# Patient Record
Sex: MALE | HISPANIC OR LATINO | URBAN - METROPOLITAN AREA
[De-identification: names, ages, dates, MRNs, and addresses within clinical notes are randomized per-mention and may not be internally consistent; named-entity substitution may affect disease eponyms.]

---

## 2021-08-16 ENCOUNTER — EMERGENCY (EMERGENCY)
Facility: HOSPITAL | Age: 71
LOS: 1 days | Discharge: HOME | End: 2021-08-16
Attending: EMERGENCY MEDICINE | Admitting: EMERGENCY MEDICINE
Payer: MEDICARE

## 2021-08-16 VITALS
DIASTOLIC BLOOD PRESSURE: 87 MMHG | HEART RATE: 79 BPM | SYSTOLIC BLOOD PRESSURE: 147 MMHG | RESPIRATION RATE: 18 BRPM | OXYGEN SATURATION: 97 % | TEMPERATURE: 98 F

## 2021-08-16 VITALS
OXYGEN SATURATION: 98 % | WEIGHT: 175.05 LBS | SYSTOLIC BLOOD PRESSURE: 159 MMHG | RESPIRATION RATE: 19 BRPM | HEART RATE: 83 BPM | DIASTOLIC BLOOD PRESSURE: 92 MMHG | HEIGHT: 67 IN | TEMPERATURE: 98 F

## 2021-08-16 LAB
ALBUMIN SERPL ELPH-MCNC: 3.9 G/DL — SIGNIFICANT CHANGE UP (ref 3.3–5)
ALP SERPL-CCNC: 79 U/L — SIGNIFICANT CHANGE UP (ref 40–120)
ALT FLD-CCNC: 27 U/L — SIGNIFICANT CHANGE UP (ref 12–78)
ANION GAP SERPL CALC-SCNC: 5 MMOL/L — SIGNIFICANT CHANGE UP (ref 5–17)
APPEARANCE UR: CLEAR — SIGNIFICANT CHANGE UP
AST SERPL-CCNC: 18 U/L — SIGNIFICANT CHANGE UP (ref 15–37)
BASOPHILS # BLD AUTO: 0.02 K/UL — SIGNIFICANT CHANGE UP (ref 0–0.2)
BASOPHILS NFR BLD AUTO: 0.3 % — SIGNIFICANT CHANGE UP (ref 0–2)
BILIRUB SERPL-MCNC: 0.4 MG/DL — SIGNIFICANT CHANGE UP (ref 0.2–1.2)
BILIRUB UR-MCNC: NEGATIVE — SIGNIFICANT CHANGE UP
BUN SERPL-MCNC: 17 MG/DL — SIGNIFICANT CHANGE UP (ref 7–23)
CALCIUM SERPL-MCNC: 8.8 MG/DL — SIGNIFICANT CHANGE UP (ref 8.5–10.1)
CHLORIDE SERPL-SCNC: 109 MMOL/L — HIGH (ref 96–108)
CO2 SERPL-SCNC: 30 MMOL/L — SIGNIFICANT CHANGE UP (ref 22–31)
COLOR SPEC: YELLOW — SIGNIFICANT CHANGE UP
CREAT SERPL-MCNC: 0.87 MG/DL — SIGNIFICANT CHANGE UP (ref 0.5–1.3)
DIFF PNL FLD: ABNORMAL
EOSINOPHIL # BLD AUTO: 0.01 K/UL — SIGNIFICANT CHANGE UP (ref 0–0.5)
EOSINOPHIL NFR BLD AUTO: 0.1 % — SIGNIFICANT CHANGE UP (ref 0–6)
GLUCOSE SERPL-MCNC: 114 MG/DL — HIGH (ref 70–99)
GLUCOSE UR QL: NEGATIVE — SIGNIFICANT CHANGE UP
HCT VFR BLD CALC: 43.3 % — SIGNIFICANT CHANGE UP (ref 39–50)
HGB BLD-MCNC: 14.1 G/DL — SIGNIFICANT CHANGE UP (ref 13–17)
IMM GRANULOCYTES NFR BLD AUTO: 0.3 % — SIGNIFICANT CHANGE UP (ref 0–1.5)
KETONES UR-MCNC: NEGATIVE — SIGNIFICANT CHANGE UP
LEUKOCYTE ESTERASE UR-ACNC: NEGATIVE — SIGNIFICANT CHANGE UP
LIDOCAIN IGE QN: 174 U/L — SIGNIFICANT CHANGE UP (ref 73–393)
LYMPHOCYTES # BLD AUTO: 1.65 K/UL — SIGNIFICANT CHANGE UP (ref 1–3.3)
LYMPHOCYTES # BLD AUTO: 23.9 % — SIGNIFICANT CHANGE UP (ref 13–44)
MCHC RBC-ENTMCNC: 28.4 PG — SIGNIFICANT CHANGE UP (ref 27–34)
MCHC RBC-ENTMCNC: 32.6 GM/DL — SIGNIFICANT CHANGE UP (ref 32–36)
MCV RBC AUTO: 87.3 FL — SIGNIFICANT CHANGE UP (ref 80–100)
MONOCYTES # BLD AUTO: 0.38 K/UL — SIGNIFICANT CHANGE UP (ref 0–0.9)
MONOCYTES NFR BLD AUTO: 5.5 % — SIGNIFICANT CHANGE UP (ref 2–14)
NEUTROPHILS # BLD AUTO: 4.81 K/UL — SIGNIFICANT CHANGE UP (ref 1.8–7.4)
NEUTROPHILS NFR BLD AUTO: 69.9 % — SIGNIFICANT CHANGE UP (ref 43–77)
NITRITE UR-MCNC: NEGATIVE — SIGNIFICANT CHANGE UP
NRBC # BLD: 0 /100 WBCS — SIGNIFICANT CHANGE UP (ref 0–0)
PH UR: 6 — SIGNIFICANT CHANGE UP (ref 5–8)
PLATELET # BLD AUTO: 269 K/UL — SIGNIFICANT CHANGE UP (ref 150–400)
POTASSIUM SERPL-MCNC: 3.9 MMOL/L — SIGNIFICANT CHANGE UP (ref 3.5–5.3)
POTASSIUM SERPL-SCNC: 3.9 MMOL/L — SIGNIFICANT CHANGE UP (ref 3.5–5.3)
PROT SERPL-MCNC: 7.2 G/DL — SIGNIFICANT CHANGE UP (ref 6–8.3)
PROT UR-MCNC: 30 MG/DL
RBC # BLD: 4.96 M/UL — SIGNIFICANT CHANGE UP (ref 4.2–5.8)
RBC # FLD: 12.7 % — SIGNIFICANT CHANGE UP (ref 10.3–14.5)
SODIUM SERPL-SCNC: 144 MMOL/L — SIGNIFICANT CHANGE UP (ref 135–145)
SP GR SPEC: 1.02 — SIGNIFICANT CHANGE UP (ref 1.01–1.02)
UROBILINOGEN FLD QL: NEGATIVE — SIGNIFICANT CHANGE UP
WBC # BLD: 6.89 K/UL — SIGNIFICANT CHANGE UP (ref 3.8–10.5)
WBC # FLD AUTO: 6.89 K/UL — SIGNIFICANT CHANGE UP (ref 3.8–10.5)

## 2021-08-16 PROCEDURE — 74176 CT ABD & PELVIS W/O CONTRAST: CPT | Mod: MA

## 2021-08-16 PROCEDURE — 99284 EMERGENCY DEPT VISIT MOD MDM: CPT | Mod: 25

## 2021-08-16 PROCEDURE — 85025 COMPLETE CBC W/AUTO DIFF WBC: CPT

## 2021-08-16 PROCEDURE — 76870 US EXAM SCROTUM: CPT | Mod: 26

## 2021-08-16 PROCEDURE — 83690 ASSAY OF LIPASE: CPT

## 2021-08-16 PROCEDURE — 81001 URINALYSIS AUTO W/SCOPE: CPT

## 2021-08-16 PROCEDURE — 99284 EMERGENCY DEPT VISIT MOD MDM: CPT

## 2021-08-16 PROCEDURE — 36415 COLL VENOUS BLD VENIPUNCTURE: CPT

## 2021-08-16 PROCEDURE — 74176 CT ABD & PELVIS W/O CONTRAST: CPT | Mod: 26,MA

## 2021-08-16 PROCEDURE — 76870 US EXAM SCROTUM: CPT

## 2021-08-16 PROCEDURE — 80053 COMPREHEN METABOLIC PANEL: CPT

## 2021-08-16 PROCEDURE — 87086 URINE CULTURE/COLONY COUNT: CPT

## 2021-08-16 RX ORDER — CEFUROXIME AXETIL 250 MG
500 TABLET ORAL ONCE
Refills: 0 | Status: COMPLETED | OUTPATIENT
Start: 2021-08-16 | End: 2021-08-16

## 2021-08-16 RX ORDER — TAMSULOSIN HYDROCHLORIDE 0.4 MG/1
1 CAPSULE ORAL
Qty: 15 | Refills: 0
Start: 2021-08-16 | End: 2021-08-30

## 2021-08-16 RX ORDER — TAMSULOSIN HYDROCHLORIDE 0.4 MG/1
0.4 CAPSULE ORAL AT BEDTIME
Refills: 0 | Status: DISCONTINUED | OUTPATIENT
Start: 2021-08-16 | End: 2021-08-20

## 2021-08-16 RX ORDER — ONDANSETRON 8 MG/1
1 TABLET, FILM COATED ORAL
Qty: 15 | Refills: 0
Start: 2021-08-16 | End: 2021-08-20

## 2021-08-16 RX ORDER — CEFUROXIME AXETIL 250 MG
1 TABLET ORAL
Qty: 10 | Refills: 0
Start: 2021-08-16 | End: 2021-08-20

## 2021-08-16 RX ORDER — OXYCODONE AND ACETAMINOPHEN 5; 325 MG/1; MG/1
1 TABLET ORAL
Qty: 12 | Refills: 0
Start: 2021-08-16 | End: 2021-08-18

## 2021-08-16 RX ADMIN — Medication 500 MILLIGRAM(S): at 21:35

## 2021-08-16 RX ADMIN — TAMSULOSIN HYDROCHLORIDE 0.4 MILLIGRAM(S): 0.4 CAPSULE ORAL at 21:35

## 2021-08-16 NOTE — ED PROVIDER NOTE - ATTENDING CONTRIBUTION TO CARE
Pt is a 70 yo male who presents to the ED with a cc of urinary frequency. Pt with no significant past medical history. Reports that approx 1-2 month ago pt started experiencing urinary frequency, urgency, with a weak stream. He reports that it felt like he would need to urinate every 10 min. Yesterday he developed suprapubic discomfort with testicular pressure. He does admit to decreased water intake and believes that this may be contributing to his symptoms. He also reports that 2 weeks ago he was seen at an urgent care due to a cough which seems to have resolved. He was given a Z-Pack which he did not take and was tested for COVID 19 with a negative result. Denies fever, chills, N/V, CP, SOB, ext numbness or weakness. Does note discomfort on urination x several days. Denies hematuria. Denies recent weight loss. Pt reports that he follow up with a urologist approx 1.5 yr ago and the urologist preformed some type of manual exam on his prostate but pt states that this was uncomfortable and he never follow up for the results. On exam pt lying in bed NAD, NCAT, PERRL, EOMI, heart RR, lungs CTA, abd soft NT/ND. Uncircumcised with no lesions. Foreskin easily retracts no discharge from penis noted. No testicular pain on exam. No inguinal hernia noted. Pt presenting with increased urinary frequency, urgency, and now with dysuria and testicular pain. Concern for BPH, vs infection, vs renal stone, vs testicular pathology. Will obtain screening labs, CT renal stone hunt, testicular US. Will monitor.

## 2021-08-16 NOTE — ED PROVIDER NOTE - OBJECTIVE STATEMENT
71 year old male with no PMH, presents to the ED with increased urinary frequency. States about two months ago he started having an increased sensation to pee, worsened today to every 10 minutes, with constant discomfort in the genital area, rates 6/10, no radiation, believes his decreased water intake today may of contributed to greater frequency of urination. 2 weeks ago he visited urgent care due to coughing, given z-pack (didn't take), tested negative for COVID. No previous history of increased sensation, no recent travel, or sick contacts. Denies fever, chills, weight loss, vision changes, nausea, vomiting, rash, chest pain or shortness of breath.

## 2021-08-16 NOTE — ED PROVIDER NOTE - PROGRESS NOTE DETAILS
res results of labs and images reviewed, copy provided, all questions answered. pt resting comfortably, pain controlled, and pt able to void. Advised on meed for urology follow up and all questions answered. Daughter at bedside

## 2021-08-16 NOTE — ED PROVIDER NOTE - CLINICAL SUMMARY MEDICAL DECISION MAKING FREE TEXT BOX
Pt presenting with increased urinary frequency, urgency, and now with dysuria and testicular pain. Concern for BPH, vs infection, vs renal stone, vs testicular pathology. Will obtain screening labs, CT renal stone hunt, testicular US. Will monitor.

## 2021-08-16 NOTE — ED PROVIDER NOTE - CARE PROVIDER_API CALL
Emmanuel Hansen)  Urology  [address] [place] [address], [address]  [place] [address]  Phone: [phone]  Fax: [phone]  Follow Up Time:

## 2021-08-16 NOTE — ED PROVIDER NOTE - PATIENT PORTAL LINK FT
You can access the FollowMyHealth Patient Portal offered by [facility] by registering at the following website: [email or web address]. By joining [employer]’s FollowMyHealth portal, you will also be able to view your health information using other applications (apps) compatible with our system.

## 2021-08-16 NOTE — ED PROVIDER NOTE - NSFOLLOWUPINSTRUCTIONS_ED_ALL_ED_FT
Return to the ED for any new or worsening symptoms including but not limited to fever temperature 100.4 or greater  Take your medication as prescribed  Flomax 1 tab daily   Ceftin 1 tab 2 times a day   Percocet per label instructions as needed for severe pain  Zofran per label instructions as needed for nausea  Follow up with urology call tomorrow to schedule follow up   Advance activity as tolerated  Kidney Stones    WHAT YOU NEED TO KNOW:    Kidney stones form in the urinary system when the water and waste in your urine are out of balance. When this happens, certain types of waste crystals separate from the urine. The crystals build up and form kidney stones. You may have more than one kidney stone.    Kidney Stones          DISCHARGE INSTRUCTIONS:    Return to the emergency department if:   •You are vomiting and it is not relieved with medicine.          Call your doctor or kidney specialist if:   •You have a fever.      •You have trouble urinating.      •You see blood in your urine.      •You have severe pain.      •You have any questions or concerns about your condition or care.      Medicines: You may need any of the following:  •NSAIDs, such as ibuprofen, help decrease swelling, pain, and fever. This medicine is available with or without a doctor's order. NSAIDs can cause stomach bleeding or kidney problems in certain people. If you take blood thinner medicine, always ask your healthcare provider if NSAIDs are safe for you. Always read the medicine label and follow directions.      •Acetaminophen decreases pain and fever. It is available without a doctor's order. Ask how much to take and how often to take it. Follow directions. Read the labels of all other medicines you are using to see if they also contain acetaminophen, or ask your doctor or pharmacist. Acetaminophen can cause liver damage if not taken correctly. Do not use more than 4 grams (4,000 milligrams) total of acetaminophen in one day.       •Prescription pain medicine may be given. Ask your healthcare provider how to take this medicine safely. Some prescription pain medicines contain acetaminophen. Do not take other medicines that contain acetaminophen without talking to your healthcare provider. Too much acetaminophen may cause liver damage. Prescription pain medicine may cause constipation. Ask your healthcare provider how to prevent or treat constipation.       •Medicines to balance your electrolytes may be needed.      •Take your medicine as directed. Contact your healthcare provider if you think your medicine is not helping or if you have side effects. Tell him or her if you are allergic to any medicine. Keep a list of the medicines, vitamins, and herbs you take. Include the amounts, and when and why you take them. Bring the list or the pill bottles to follow-up visits. Carry your medicine list with you in case of an emergency.      What you can do to manage kidney stones:   •Drink more liquids. Your healthcare provider may tell you to drink at least 8 to 12 (eight-ounce) cups of liquids each day. This helps flush out the kidney stones when you urinate. Water is the best liquid to drink.      •Strain your urine every time you go to the bathroom. Urinate through a strainer or a piece of thin cloth to catch the stones. Take the stones to your healthcare provider so they can be sent to the lab for tests. This will help your healthcare providers plan the best treatment for you.  Look for Stones in the Filter           •Eat a variety of healthy foods. Healthy foods include fruits, vegetables, whole-grain breads, low-fat dairy products, beans, and fish. You may need to limit how much sodium (salt) or protein you eat. Ask for information about the best foods for you.  Healthy Foods           •Be physically active as directed. Your stones may pass more easily if you stay active. Physical activity can also help you manage your weight. Ask about the best activities for you.  [employer] Walking for Exercise           After you pass the kidney stones: Your healthcare provider may order a 24-hour urine test. Results from a 24-hour urine test will help your healthcare provider plan ways to prevent more stones from forming. Your healthcare provider will give you more instructions.    Follow up with your doctor or kidney specialist as directed: Write down your questions so you remember to ask them during your visits.

## 2021-08-17 ENCOUNTER — EMERGENCY (EMERGENCY)
Facility: HOSPITAL | Age: 71
LOS: 1 days | Discharge: HOME | End: 2021-08-17
Attending: EMERGENCY MEDICINE | Admitting: EMERGENCY MEDICINE
Payer: MEDICARE

## 2021-08-17 VITALS
SYSTOLIC BLOOD PRESSURE: 174 MMHG | TEMPERATURE: 98 F | HEART RATE: 89 BPM | DIASTOLIC BLOOD PRESSURE: 82 MMHG | RESPIRATION RATE: 16 BRPM | OXYGEN SATURATION: 98 %

## 2021-08-17 VITALS
WEIGHT: 169.98 LBS | OXYGEN SATURATION: 98 % | SYSTOLIC BLOOD PRESSURE: 181 MMHG | HEIGHT: 67 IN | HEART RATE: 78 BPM | TEMPERATURE: 99 F | RESPIRATION RATE: 18 BRPM | DIASTOLIC BLOOD PRESSURE: 81 MMHG

## 2021-08-17 LAB
ALBUMIN SERPL ELPH-MCNC: 4.1 G/DL — SIGNIFICANT CHANGE UP (ref 3.3–5)
ALP SERPL-CCNC: 80 U/L — SIGNIFICANT CHANGE UP (ref 40–120)
ALT FLD-CCNC: 26 U/L — SIGNIFICANT CHANGE UP (ref 12–78)
ANION GAP SERPL CALC-SCNC: 7 MMOL/L — SIGNIFICANT CHANGE UP (ref 5–17)
AST SERPL-CCNC: 17 U/L — SIGNIFICANT CHANGE UP (ref 15–37)
BASOPHILS # BLD AUTO: 0.01 K/UL — SIGNIFICANT CHANGE UP (ref 0–0.2)
BASOPHILS NFR BLD AUTO: 0.1 % — SIGNIFICANT CHANGE UP (ref 0–2)
BILIRUB SERPL-MCNC: 0.5 MG/DL — SIGNIFICANT CHANGE UP (ref 0.2–1.2)
BUN SERPL-MCNC: 24 MG/DL — HIGH (ref 7–23)
CALCIUM SERPL-MCNC: 8.9 MG/DL — SIGNIFICANT CHANGE UP (ref 8.5–10.1)
CHLORIDE SERPL-SCNC: 108 MMOL/L — SIGNIFICANT CHANGE UP (ref 96–108)
CO2 SERPL-SCNC: 27 MMOL/L — SIGNIFICANT CHANGE UP (ref 22–31)
CREAT SERPL-MCNC: 1.5 MG/DL — HIGH (ref 0.5–1.3)
EOSINOPHIL # BLD AUTO: 0 K/UL — SIGNIFICANT CHANGE UP (ref 0–0.5)
EOSINOPHIL NFR BLD AUTO: 0 % — SIGNIFICANT CHANGE UP (ref 0–6)
GLUCOSE SERPL-MCNC: 180 MG/DL — HIGH (ref 70–99)
HCT VFR BLD CALC: 43.3 % — SIGNIFICANT CHANGE UP (ref 39–50)
HGB BLD-MCNC: 14.2 G/DL — SIGNIFICANT CHANGE UP (ref 13–17)
IMM GRANULOCYTES NFR BLD AUTO: 0.5 % — SIGNIFICANT CHANGE UP (ref 0–1.5)
LYMPHOCYTES # BLD AUTO: 0.61 K/UL — LOW (ref 1–3.3)
LYMPHOCYTES # BLD AUTO: 7 % — LOW (ref 13–44)
MCHC RBC-ENTMCNC: 28.3 PG — SIGNIFICANT CHANGE UP (ref 27–34)
MCHC RBC-ENTMCNC: 32.8 GM/DL — SIGNIFICANT CHANGE UP (ref 32–36)
MCV RBC AUTO: 86.4 FL — SIGNIFICANT CHANGE UP (ref 80–100)
MONOCYTES # BLD AUTO: 0.22 K/UL — SIGNIFICANT CHANGE UP (ref 0–0.9)
MONOCYTES NFR BLD AUTO: 2.5 % — SIGNIFICANT CHANGE UP (ref 2–14)
NEUTROPHILS # BLD AUTO: 7.85 K/UL — HIGH (ref 1.8–7.4)
NEUTROPHILS NFR BLD AUTO: 89.9 % — HIGH (ref 43–77)
NRBC # BLD: 0 /100 WBCS — SIGNIFICANT CHANGE UP (ref 0–0)
PLATELET # BLD AUTO: 266 K/UL — SIGNIFICANT CHANGE UP (ref 150–400)
POTASSIUM SERPL-MCNC: 3.9 MMOL/L — SIGNIFICANT CHANGE UP (ref 3.5–5.3)
POTASSIUM SERPL-SCNC: 3.9 MMOL/L — SIGNIFICANT CHANGE UP (ref 3.5–5.3)
PROT SERPL-MCNC: 7.5 G/DL — SIGNIFICANT CHANGE UP (ref 6–8.3)
RBC # BLD: 5.01 M/UL — SIGNIFICANT CHANGE UP (ref 4.2–5.8)
RBC # FLD: 12.8 % — SIGNIFICANT CHANGE UP (ref 10.3–14.5)
SODIUM SERPL-SCNC: 142 MMOL/L — SIGNIFICANT CHANGE UP (ref 135–145)
WBC # BLD: 8.73 K/UL — SIGNIFICANT CHANGE UP (ref 3.8–10.5)
WBC # FLD AUTO: 8.73 K/UL — SIGNIFICANT CHANGE UP (ref 3.8–10.5)

## 2021-08-17 PROCEDURE — 80053 COMPREHEN METABOLIC PANEL: CPT

## 2021-08-17 PROCEDURE — 96375 TX/PRO/DX INJ NEW DRUG ADDON: CPT

## 2021-08-17 PROCEDURE — 85025 COMPLETE CBC W/AUTO DIFF WBC: CPT

## 2021-08-17 PROCEDURE — 99284 EMERGENCY DEPT VISIT MOD MDM: CPT | Mod: 25

## 2021-08-17 PROCEDURE — 96374 THER/PROPH/DIAG INJ IV PUSH: CPT

## 2021-08-17 PROCEDURE — 99284 EMERGENCY DEPT VISIT MOD MDM: CPT

## 2021-08-17 PROCEDURE — 36415 COLL VENOUS BLD VENIPUNCTURE: CPT

## 2021-08-17 RX ORDER — ONDANSETRON 8 MG/1
4 TABLET, FILM COATED ORAL ONCE
Refills: 0 | Status: COMPLETED | OUTPATIENT
Start: 2021-08-17 | End: 2021-08-17

## 2021-08-17 RX ORDER — KETOROLAC TROMETHAMINE 30 MG/ML
15 SYRINGE (ML) INJECTION ONCE
Refills: 0 | Status: DISCONTINUED | OUTPATIENT
Start: 2021-08-17 | End: 2021-08-17

## 2021-08-17 RX ORDER — SODIUM CHLORIDE 9 MG/ML
1000 INJECTION INTRAMUSCULAR; INTRAVENOUS; SUBCUTANEOUS ONCE
Refills: 0 | Status: COMPLETED | OUTPATIENT
Start: 2021-08-17 | End: 2021-08-17

## 2021-08-17 RX ORDER — MORPHINE SULFATE 50 MG/1
2 CAPSULE, EXTENDED RELEASE ORAL ONCE
Refills: 0 | Status: DISCONTINUED | OUTPATIENT
Start: 2021-08-17 | End: 2021-08-17

## 2021-08-17 RX ADMIN — ONDANSETRON 4 MILLIGRAM(S): 8 TABLET, FILM COATED ORAL at 21:27

## 2021-08-17 RX ADMIN — MORPHINE SULFATE 2 MILLIGRAM(S): 50 CAPSULE, EXTENDED RELEASE ORAL at 21:27

## 2021-08-17 RX ADMIN — SODIUM CHLORIDE 1000 MILLILITER(S): 9 INJECTION INTRAMUSCULAR; INTRAVENOUS; SUBCUTANEOUS at 21:59

## 2021-08-17 RX ADMIN — Medication 15 MILLIGRAM(S): at 21:27

## 2021-08-17 RX ADMIN — SODIUM CHLORIDE 1000 MILLILITER(S): 9 INJECTION INTRAMUSCULAR; INTRAVENOUS; SUBCUTANEOUS at 21:27

## 2021-08-17 NOTE — ED PROVIDER NOTE - OBJECTIVE STATEMENT
70yo male who presents with flank pain today. pt was diagnosed with 5mm stone, and sent home with pain meds, but today was not feeling well, vomited so pt did not take his pain meds, no fever, chills, no other copmlaints

## 2021-08-17 NOTE — ED ADULT TRIAGE NOTE - CHIEF COMPLAINT QUOTE
Patient is a 70yo male seen here for kidney stones patient states he is still in pain and nausea and vomiting

## 2021-08-17 NOTE — ED ADULT NURSE NOTE - OBJECTIVE STATEMENT
Patient alert and oriented X 3. Complaining of worsening left flank pain. Patient seen in ED yesterday and diagnosed with kidney stone. Patient had episode of vomiting at home. Denies chest pain, shortness of breath, headache, dizziness and fever. Lungs clears bilaterally. Respirations even and not labored. Abdomen soft non tender.

## 2021-08-17 NOTE — ED PROVIDER NOTE - PROGRESS NOTE DETAILS
pt reevaluted, feeling better, pain has improved, will give PO challenge, if pt can tolerate, will d/c home pt has follow up in 2 days with urology

## 2021-08-17 NOTE — ED PROVIDER NOTE - PATIENT PORTAL LINK FT
You can access the FollowMyHealth Patient Portal offered by [facility] by registering at the following website: [email or web address]. By joining [employer]’s FollowMyHealth portal, you will also be able to view your health information using other applications (apps) compatible with our system.

## 2021-08-17 NOTE — ED PROVIDER NOTE - NSFOLLOWUPINSTRUCTIONS_ED_ALL_ED_FT
Renal Colic    WHAT YOU NEED TO KNOW:    Renal colic is severe pain in your lower back or sides. The pain is usually on one side, but may be on both sides of your lower back. Renal colic may start quickly, come and go, and become worse over time. Renal colic is caused by a blockage in your urinary tract. The most common cause of a blockage is a kidney stone. Blood clots, ureter spasms, and dead tissue may also block your urinary tract.    Female Urinary System                DISCHARGE INSTRUCTIONS:    Return to the emergency department if:   •You cannot stop vomiting.      •You see new or increased bleeding when you urinate.      •You are urinating less than usual, or not at all.      •Your pain is not getting better even after treatment.      Call your doctor if:   •You have fever.      •You need to urinate more often than usual, or right away.      •You see a stone in your urine strainer after you urinate.      •You have questions or concerns about your condition or care.      Medicines:   •Medicines may help decrease pain and muscle spasms. You may also need medicine to calm your stomach and stop vomiting.      •Take your medicine as directed. Contact your healthcare provider if you think your medicine is not helping or if you have side effects. Tell him of her if you are allergic to any medicine. Keep a list of the medicines, vitamins, and herbs you take. Include the amounts, and when and why you take them. Bring the list or the pill bottles to follow-up visits. Carry your medicine list with you in case of an emergency.      Manage your symptoms:   •Drink liquids as directed. This will help decrease pain and flush blockages from your urinary tract. Ask how much liquid to drink each day and which liquids are best for you. You may need to drink about 3 liters (12 glasses) of liquids each day. Half of your total daily liquids should be water. Limit coffee, tea, and soda to 2 cups daily. Your urine should be pale and clear.      •Strain your urine every time you urinate. Urinate into a strainer (funnel with a fine mesh on the bottom) or glass jar to collect kidney stones. Give the kidney stones to your healthcare provider at your next visit.  Look for Stones in the Filter           •Eat a variety of healthy foods. Healthy foods include fruits, vegetables, whole-grain breads, low-fat dairy products, beans, lean meats, and fish. You may need to increase the amount of citrus fruit you eat, such as oranges. Ask your healthcare provider how much salt, calcium, and protein you should eat.  Healthy Foods           •Avoid activity in hot temperatures. Heat may cause you to become dehydrated and urinate less.      Follow up with your doctor as directed: You may need to return for tests to check if your blockage has cleared. Write down your questions so you remember to ask them during your visits.

## 2021-08-18 LAB
CULTURE RESULTS: NO GROWTH — SIGNIFICANT CHANGE UP
SPECIMEN SOURCE: SIGNIFICANT CHANGE UP

## 2022-01-07 ENCOUNTER — EMERGENCY (EMERGENCY)
Facility: HOSPITAL | Age: 72
LOS: 1 days | Discharge: HOME | End: 2022-01-07
Attending: EMERGENCY MEDICINE | Admitting: EMERGENCY MEDICINE
Payer: MEDICARE

## 2022-01-07 VITALS
OXYGEN SATURATION: 98 % | TEMPERATURE: 98 F | HEART RATE: 107 BPM | WEIGHT: 164.91 LBS | RESPIRATION RATE: 16 BRPM | SYSTOLIC BLOOD PRESSURE: 163 MMHG | DIASTOLIC BLOOD PRESSURE: 85 MMHG | HEIGHT: 67 IN

## 2022-01-07 VITALS
DIASTOLIC BLOOD PRESSURE: 72 MMHG | TEMPERATURE: 98 F | SYSTOLIC BLOOD PRESSURE: 157 MMHG | HEART RATE: 83 BPM | RESPIRATION RATE: 16 BRPM | OXYGEN SATURATION: 98 %

## 2022-01-07 PROBLEM — Z00.00 ENCOUNTER FOR PREVENTIVE HEALTH EXAMINATION: Status: ACTIVE | Noted: 2022-01-07

## 2022-01-07 LAB
ALBUMIN SERPL ELPH-MCNC: 4.1 G/DL — SIGNIFICANT CHANGE UP (ref 3.3–5)
ALP SERPL-CCNC: 70 U/L — SIGNIFICANT CHANGE UP (ref 40–120)
ALT FLD-CCNC: 27 U/L — SIGNIFICANT CHANGE UP (ref 12–78)
ANION GAP SERPL CALC-SCNC: 6 MMOL/L — SIGNIFICANT CHANGE UP (ref 5–17)
AST SERPL-CCNC: 17 U/L — SIGNIFICANT CHANGE UP (ref 15–37)
BASOPHILS # BLD AUTO: 0.01 K/UL — SIGNIFICANT CHANGE UP (ref 0–0.2)
BASOPHILS NFR BLD AUTO: 0.2 % — SIGNIFICANT CHANGE UP (ref 0–2)
BILIRUB SERPL-MCNC: 0.8 MG/DL — SIGNIFICANT CHANGE UP (ref 0.2–1.2)
BUN SERPL-MCNC: 21 MG/DL — SIGNIFICANT CHANGE UP (ref 7–23)
CALCIUM SERPL-MCNC: 8.9 MG/DL — SIGNIFICANT CHANGE UP (ref 8.5–10.1)
CHLORIDE SERPL-SCNC: 107 MMOL/L — SIGNIFICANT CHANGE UP (ref 96–108)
CK SERPL-CCNC: 132 U/L — SIGNIFICANT CHANGE UP (ref 26–308)
CK SERPL-CCNC: 164 U/L — SIGNIFICANT CHANGE UP (ref 26–308)
CO2 SERPL-SCNC: 28 MMOL/L — SIGNIFICANT CHANGE UP (ref 22–31)
CREAT SERPL-MCNC: 1.1 MG/DL — SIGNIFICANT CHANGE UP (ref 0.5–1.3)
D DIMER BLD IA.RAPID-MCNC: <150 NG/ML DDU — SIGNIFICANT CHANGE UP
EOSINOPHIL # BLD AUTO: 0.04 K/UL — SIGNIFICANT CHANGE UP (ref 0–0.5)
EOSINOPHIL NFR BLD AUTO: 0.8 % — SIGNIFICANT CHANGE UP (ref 0–6)
GLUCOSE SERPL-MCNC: 137 MG/DL — HIGH (ref 70–99)
HCT VFR BLD CALC: 41.7 % — SIGNIFICANT CHANGE UP (ref 39–50)
HGB BLD-MCNC: 14.5 G/DL — SIGNIFICANT CHANGE UP (ref 13–17)
IMM GRANULOCYTES NFR BLD AUTO: 0.2 % — SIGNIFICANT CHANGE UP (ref 0–1.5)
LYMPHOCYTES # BLD AUTO: 2.16 K/UL — SIGNIFICANT CHANGE UP (ref 1–3.3)
LYMPHOCYTES # BLD AUTO: 42.8 % — SIGNIFICANT CHANGE UP (ref 13–44)
MAGNESIUM SERPL-MCNC: 2.4 MG/DL — SIGNIFICANT CHANGE UP (ref 1.6–2.6)
MCHC RBC-ENTMCNC: 29.8 PG — SIGNIFICANT CHANGE UP (ref 27–34)
MCHC RBC-ENTMCNC: 34.8 GM/DL — SIGNIFICANT CHANGE UP (ref 32–36)
MCV RBC AUTO: 85.6 FL — SIGNIFICANT CHANGE UP (ref 80–100)
MONOCYTES # BLD AUTO: 0.3 K/UL — SIGNIFICANT CHANGE UP (ref 0–0.9)
MONOCYTES NFR BLD AUTO: 5.9 % — SIGNIFICANT CHANGE UP (ref 2–14)
NEUTROPHILS # BLD AUTO: 2.53 K/UL — SIGNIFICANT CHANGE UP (ref 1.8–7.4)
NEUTROPHILS NFR BLD AUTO: 50.1 % — SIGNIFICANT CHANGE UP (ref 43–77)
NRBC # BLD: 0 /100 WBCS — SIGNIFICANT CHANGE UP (ref 0–0)
NT-PROBNP SERPL-SCNC: 45 PG/ML — SIGNIFICANT CHANGE UP (ref 0–125)
PLATELET # BLD AUTO: 229 K/UL — SIGNIFICANT CHANGE UP (ref 150–400)
POTASSIUM SERPL-MCNC: 3.3 MMOL/L — LOW (ref 3.5–5.3)
POTASSIUM SERPL-SCNC: 3.3 MMOL/L — LOW (ref 3.5–5.3)
PROT SERPL-MCNC: 7.3 G/DL — SIGNIFICANT CHANGE UP (ref 6–8.3)
RBC # BLD: 4.87 M/UL — SIGNIFICANT CHANGE UP (ref 4.2–5.8)
RBC # FLD: 12.2 % — SIGNIFICANT CHANGE UP (ref 10.3–14.5)
SARS-COV-2 RNA SPEC QL NAA+PROBE: SIGNIFICANT CHANGE UP
SODIUM SERPL-SCNC: 141 MMOL/L — SIGNIFICANT CHANGE UP (ref 135–145)
TROPONIN I, HIGH SENSITIVITY RESULT: 6.6 NG/L — SIGNIFICANT CHANGE UP
TROPONIN I, HIGH SENSITIVITY RESULT: 9.4 NG/L — SIGNIFICANT CHANGE UP
WBC # BLD: 5.05 K/UL — SIGNIFICANT CHANGE UP (ref 3.8–10.5)
WBC # FLD AUTO: 5.05 K/UL — SIGNIFICANT CHANGE UP (ref 3.8–10.5)

## 2022-01-07 PROCEDURE — 85025 COMPLETE CBC W/AUTO DIFF WBC: CPT

## 2022-01-07 PROCEDURE — 71046 X-RAY EXAM CHEST 2 VIEWS: CPT | Mod: 26

## 2022-01-07 PROCEDURE — 83735 ASSAY OF MAGNESIUM: CPT

## 2022-01-07 PROCEDURE — 82550 ASSAY OF CK (CPK): CPT

## 2022-01-07 PROCEDURE — 99285 EMERGENCY DEPT VISIT HI MDM: CPT | Mod: CS

## 2022-01-07 PROCEDURE — 36415 COLL VENOUS BLD VENIPUNCTURE: CPT

## 2022-01-07 PROCEDURE — 71046 X-RAY EXAM CHEST 2 VIEWS: CPT

## 2022-01-07 PROCEDURE — 99284 EMERGENCY DEPT VISIT MOD MDM: CPT

## 2022-01-07 PROCEDURE — 84484 ASSAY OF TROPONIN QUANT: CPT

## 2022-01-07 PROCEDURE — 80053 COMPREHEN METABOLIC PANEL: CPT

## 2022-01-07 PROCEDURE — 99284 EMERGENCY DEPT VISIT MOD MDM: CPT | Mod: 25

## 2022-01-07 PROCEDURE — 93010 ELECTROCARDIOGRAM REPORT: CPT

## 2022-01-07 PROCEDURE — 87635 SARS-COV-2 COVID-19 AMP PRB: CPT

## 2022-01-07 PROCEDURE — 83880 ASSAY OF NATRIURETIC PEPTIDE: CPT

## 2022-01-07 PROCEDURE — 93005 ELECTROCARDIOGRAM TRACING: CPT

## 2022-01-07 PROCEDURE — 85379 FIBRIN DEGRADATION QUANT: CPT

## 2022-01-07 RX ORDER — POTASSIUM CHLORIDE 20 MEQ
40 PACKET (EA) ORAL ONCE
Refills: 0 | Status: COMPLETED | OUTPATIENT
Start: 2022-01-07 | End: 2022-01-07

## 2022-01-07 RX ADMIN — Medication 40 MILLIEQUIVALENT(S): at 14:03

## 2022-01-07 NOTE — CONSULT NOTE ADULT - ASSESSMENT
Patient is a 70 y/o male with no significant PMHx presented to the ED c/o chest pain      Doubt ACS, pain likely pleuritic vs musculoskeletal.  - Evidence of ischemia, trops? will f/u third set, pt asymptomatic  - CKs? flat versus trending up: argue for/against acute ischemia    - BP elevated on presentation 163/85, no known hx of HTN     - No previous TTE  - No evidence of volume overload on exam     - Monitor and replete lytes, keep K>4, Mg>2    - Other cardiovascular workup will depend on clinical course  - All other workup per primary team  - Will follow with you             Patient is a 72 y/o male with PMHx HTN, BPH presents to ED c/o chest tightness for approx 1 hour this AM from 7:30AM - 8:30 AM, resolved spontaneously. Admitted to blurry vision night prior with DIAZ earlier in AM prior to presentation - both of which have since resolved.     Doubt ACS, pain likely pleuritic vs musculoskeletal  - No evidence of ischemia, hS trop negative x1-        - BP elevated on presentation 163/85    - No previous TTE  - No evidence of volume overload on exam     - Monitor and replete lytes, keep K>4, Mg>2    - Other cardiovascular workup will depend on clinical course  - All other workup per primary team  - Will follow with you             Patient is a 72 y/o male with PMHx HTN, BPH presents to ED c/o chest tightness for approx 1 hour this AM from 7:30AM - 8:30 AM, resolved spontaneously. Admitted to blurry vision night prior with DIAZ earlier in AM prior to presentation - both of which have since resolved.     Doubt ACS, pain likely pleuritic vs musculoskeletal  - No evidence of ischemia, hS trop negative x1  - Sinus tach w/ LLE > RLE swelling though d-dimer negative, saturating well on RA     HTN  - BP elevated on presentation 163/85 - 175/81   - Patient reports compliance with outpatient antihypertensive regimen of lisinopril-hctz 10-12.5mg PO qd, amlodipine 5mg PO qd   - Would increase amlodipine to 10mg PO qd     - No previous TTE  - Monitor and replete lytes, keep K>4, Mg>2  - Other cardiovascular workup will depend on clinical course  - All other workup per primary team  - Will follow with you             Patient is a 72 y/o male with PMHx HTN, BPH presents to ED c/o chest tightness for approx 1 hour this AM from 7:30AM - 8:30 AM, resolved spontaneously. Admitted to blurry vision night prior with DIAZ earlier in AM prior to presentation - both of which have since resolved.     Doubt ACS, pain likely pleuritic vs musculoskeletal  - No evidence of ischemia, hS trop negative x1  - Sinus tach w/ LLE > RLE swelling though d-dimer negative, saturating well on RA     HTN  - BP elevated on presentation 163/85 -> 175/81   - Patient reports compliance with outpatient antihypertensive regimen of lisinopril-hctz 10-12.5mg PO qd, amlodipine 5mg PO qd   - Would increase amlodipine to 10mg PO qd     - No previous TTE  - Monitor and replete lytes, keep K>4, Mg>2  - Other cardiovascular workup will depend on clinical course  - All other workup per primary team  - Will follow with you             Patient is a 72 y/o male with PMHx HTN, BPH presents to ED c/o chest tightness for approx 1 hour this AM from 7:30AM - 8:30 AM, resolved spontaneously. Admitted to blurry vision night prior with DIAZ earlier in AM prior to presentation - both of which have since resolved.     Doubt ACS, pain likely pleuritic vs musculoskeletal  - No evidence of ischemia, hS trop negative x1, repeat hS trop in 3 hours   - EKG without acute ischemic changes   - Sinus tach w/ LLE > RLE swelling though d-dimer negative, saturating well on RA     HTN  - BP elevated on presentation 163/85 -> 175/81   - Patient reports compliance with outpatient antihypertensive regimen of lisinopril-hctz 10-12.5mg PO qd, amlodipine 5mg PO qd   - Would increase amlodipine to 10mg PO qd     Hypokalemia  - K+ 3.3, repleted   - Monitor and replete lytes, keep K>4, Mg>2    - All other workup per primary team  - No objection to dc home with outpatient follow up in our office for further cardiac evaluation if repeat hS trop negative              Patient is a 70 y/o male with PMHx HTN, BPH presents to ED c/o chest tightness for approx 1 hour this AM from 7:30AM - 8:30 AM, resolved spontaneously. Admitted to blurry vision night prior with DIAZ earlier in AM prior to presentation - both of which have since resolved.     Doubt ACS, pain likely pleuritic vs musculoskeletal  - No evidence of ischemia, hS trop negative x1, repeat hS trop in 3 hours   - EKG without acute ischemic changes   - Sinus tach w/ LLE > RLE swelling though d-dimer negative, saturating well on RA     HTN  - BP elevated on presentation 163/85 -> 175/81   - Patient reports compliance with outpatient antihypertensive regimen of lisinopril-hctz 10-12.5mg PO qd, amlodipine 5mg PO qd   - Would increase amlodipine to 10mg PO qd     Hypokalemia  - K+ 3.3, repleted   - Monitor and replete lytes, keep K>4, Mg>2    - All other workup per primary team  - No cardiology objection to dc home with outpatient follow up in our office for further cardiac evaluation if repeat hS trop negative

## 2022-01-07 NOTE — ED PROVIDER NOTE - PATIENT PORTAL LINK FT
You can access the FollowMyHealth Patient Portal offered by [facility] by registering at the following website: [email or web address]. By joining [facility]’s FollowMyHealth portal, you will also be able to view your health information using other applications (apps) compatible with our system.

## 2022-01-07 NOTE — ED PROVIDER NOTE - NSFOLLOWUPINSTRUCTIONS_ED_ALL_ED_FT
1)  Follow-up with your Primary Medical Doctor. Call today for prompt follow-up.  2) Follow-up with Cardiology as discussed. Call today for prompt follow-up and further workup and evaluation.  3) Return to Emergency room for any worsening or persistent pain, shortness of breath, weakness, fever, abdominal pain, dizziness, passing out, unexplained pain in arms, legs, back, any vomiting, feeling like your heart is racing,  or any other concerning symptoms.  4) See attached instruction sheets for additional information, including information regarding signs and symptoms to look out for, reasons to seek immediate care and other important instructions.    1) Seguimiento con lloyd médico primario. Llame hoy para un seguimiento rápido.  2) Seguimiento con Cardiología según lo discutido. Llame hoy para un seguimiento inmediato y más estudios y evaluaciones.  3) Regrese a la duyen de emergencias si empeora o persiste el dolor, dificultad para respirar, debilidad, fiebre, dolor abdominal, mareos, desmayos, dolor inexplicable en brazos, piernas, espalda, vómitos, sensación de que lloyd corazón está acelerado o cualquier otros síntomas preocupantes.  4) Consulte las hojas de instrucciones adjuntas para obtener información adicional, incluida información sobre los signos y síntomas a los que debe prestar atención, los motivos para buscar atención inmediata y otras instrucciones importantes.

## 2022-01-07 NOTE — CONSULT NOTE ADULT - ATTENDING COMMENTS
Seen/examined. agree with above.  atypical, reproducible chest pain, with normal ekg and trop  repeat troponin in 3 hours  if negative, can dc home and will follow up in office

## 2022-01-07 NOTE — ED PROVIDER NOTE - OBJECTIVE STATEMENT
72 yo M p/w co chest discomfort x past ~ 2.5 hours. Onset at rest. no assoc sob. no palp / dizziness. no fever/chills. no cough/ uri. NO recent illness. no abd pain. no n/v/d. NO recent LYMAN/ easy fatigue. no cough/ uri. no recent covid exposures, pt fully vaccinated. no agg/allev factors. no other acute co or changes. Pt does not currently have cardiologist. No acute edema. Pain improving, still with some persistent discomfort.

## 2022-01-07 NOTE — ED PROVIDER NOTE - PROGRESS NOTE DETAILS
Pt seen by jose alejandro Dangelo to dc home after 2nd set ce Pt seen by jose alejandro Dangelo to dc home after 2nd set ce. outpt fu At length discussion with patient regarding nature of the patient's symptoms. Discussed results of all diagnostic testing in ED. Discussed chest pain, Shortness of breath, Dizziness, syncope /  near syncope precautions and instructions. Patient demonstrates understanding that a cardiac cause of the symptoms has not been totally excluded, but at this time there is no evidence to warrant an inpatient workup.  Discussed the importance of continued follow-up with Cardiology as outpatient to complete workup. tito Vega, pts daughter at length re all findings, need for prompt outpt fu

## 2022-01-07 NOTE — ED PROVIDER NOTE - LOCATION
24 [employer] Teams  [phone] ([employer] Short Range Pager)  [phone] (Long Range Pager) generalized

## 2022-01-07 NOTE — ED ADULT NURSE NOTE - CHIEF COMPLAINT QUOTE
Complaining of chest pain that started this am nonradiating  4/10. no difficulty breathing. Complaining of chest pain that started this am non-radiating  4/10. no difficulty breathing.

## 2022-01-07 NOTE — ED PROVIDER NOTE - CARE PROVIDER_API CALL
Dany Champagne)  Cardiovascular Disease; Internal Medicine  [address], [place] [id number]  Phone: [phone]  Fax: [phone]  Follow Up Time:     XU IVEY  Internal Medicine  [address] [place] [address]  Phone: [phone]  Fax: [phone]  Follow Up Time:

## 2022-01-07 NOTE — CONSULT NOTE ADULT - SUBJECTIVE AND OBJECTIVE BOX
INCOMPLETE - CHARTING IN PROGRESS    [facility] -- Wang Gutierres, Lois, Peter, Hieu Mckeon, Lashay Champagne: Office # [phone]    Patient is a 72 y/o male who presents with a chief complaint of chest pain    HPI: Patient is a 72 y/o male with nosignificant PMHx     PAST MEDICAL & SURGICAL HISTORY:  No pertinent past medical history  No significant past surgical history        ECHO  FINDINGS:      MEDICATIONS  (STANDING):    MEDICATIONS  (PRN):      FAMILY HISTORY:  Denies family history of CAD or early MI    REVIEW OF SYSTEMS:  Constitutional: denies fever, chills  HEENT: denies blurry vision, difficulty hearing  Respiratory: denies SOB, LYMAN, cough  Cardiovascular: denies CP, palpitations, orthopnea, PND, LE edema  Gastrointestinal: denies nausea, vomiting, abdominal pain  Genitourinary: denies urinary changes  Skin: Denies rashes, itching  Neurologic: denies headache, weakness, dizziness  Hematology/Oncology: denies bleeding, easy bruising  ROS negative except as noted above      SOCIAL HISTORY:  No tobacco, alcohol or drug use    Vital Signs Last 24 Hrs  T(C): 36.8 (07 Jan 2022 09:47), Max: 36.8 (07 Jan 2022 09:47)  T(F): 98.2 (07 Jan 2022 09:47), Max: 98.2 (07 Jan 2022 09:47)  HR: 107 (07 Jan 2022 09:47) (107 - 107)  BP: 163/85 (07 Jan 2022 09:47) (163/85 - 163/85)  RR: 16 (07 Jan 2022 09:47) (16 - 16)  SpO2: 98% (07 Jan 2022 09:47) (98% - 98%)    Physical Exam:  General: Well developed, well nourished, NAD  HEENT: NCAT, PERRLA, EOMI bl, moist mucous membranes   Neck: Supple, nontender, no mass  Neurology: A&Ox3, nonfocal, sensation intact   Respiratory: CTA B/L, No W/R/R  CV: RRR, +S1/S2, no murmurs, rubs or gallops  Abdominal: Soft, NT, ND +BSx4, no palpable masses  Extremities: No C/C/E, + peripheral pulses  MSK: Normal ROM, no joint erythema or warmth, no joint swelling   Heme: No obvious ecchymosis or petechiae   Skin: warm, dry, normal color      ECG:    I&O's Detail      LABS:                  I&O's Summary    BNP  RADIOLOGY & ADDITIONAL STUDIES: [facility] -- Wang Gutierres, Lois, Peter, Mike, Hieu, Lashay Cahmpagne: Office # [phone]    Patient is a 72 y/o male who presents with a chief complaint of chest tightness     HPI: Patient is a 72 y/o male with PMHx HTN, BPH presents to ED c/o chest tightness for approx 1 hour this AM from 7:30AM - 8:30 AM. Patient reports prior to bed last night he had some blurry vision which resolved spontaneously. Patient reports around 4AM he started to experience L sided head pounding for ~1-2 seconds. Denies associated dizziness or visual changes at that time. Around 7:30 AM this morning patient reports chest tightness that did not radiate, worse when he pressed down on the area. He denies associated HA, dizziness, diaphoresis, visual changes, SOB, LYMAN, palpitations, nausea or vomiting. Pain spontaneously resolved ~1 hour later. Patient denies similar episodes in the past. Follows regularly with PCP for HTN for which he is complaint with outpatient regimen. Former smoker, quit >25 years ago. Family hx significant for MI in father at 83 y/o from which he passed. Received 2/2 Pfizer Covid vaccines, last dose April 29, 2021. No recent travel, no sick contacts, no known Covid exposures.      PAST MEDICAL & SURGICAL HISTORY:  HTN  BPH    ECHO  FINDINGS:      MEDICATIONS  (STANDING):    MEDICATIONS  (PRN):      FAMILY HISTORY:  Father - MI 83 y/o  Mother- DMT2    REVIEW OF SYSTEMS:  Constitutional: denies fever, chills  HEENT: denies blurry vision, difficulty hearing  Respiratory: denies SOB, LYMAN, cough  Cardiovascular: admits chest tightness has resolved. denies palpitations, orthopnea, PND, LE edema  Gastrointestinal: denies nausea, vomiting, abdominal pain  Genitourinary: denies urinary changes  Skin: denies rashes, itching  Neurologic: denies headache, weakness, dizziness  Hematology/Oncology: denies bleeding, easy bruising  ROS negative except as noted above    SOCIAL HISTORY:  Former smoker, quit >25 years ago. No current EtOH or illicit drug use.     Vital Signs Last 24 Hrs  T(C): 36.8 (07 Jan 2022 09:47), Max: 36.8 (07 Jan 2022 09:47)  T(F): 98.2 (07 Jan 2022 09:47), Max: 98.2 (07 Jan 2022 09:47)  HR: 107 (07 Jan 2022 09:47) (107 - 107)  BP: 163/85 (07 Jan 2022 09:47) (163/85 - 163/85)  RR: 16 (07 Jan 2022 09:47) (16 - 16)  SpO2: 98% (07 Jan 2022 09:47) (98% - 98%)    Physical Exam:  General: Well developed, well nourished, NAD  HEENT: NCAT, PERRLA, EOMI b/l, moist mucous membranes   Neck: Supple, nontender, no mass  Neurology: A&Ox3, nonfocal, sensation intact   Respiratory: CTA b/l, no W/R/R  CV: Tachycardic, regula rhythm, +S1/S2, no murmurs, rubs or gallops  Abdominal: Soft, NT, ND +BSx4, no palpable masses  Extremities: No C/C/E, + peripheral pulses  MSK: Normal ROM, no joint erythema or warmth, no joint swelling   Heme: No obvious ecchymosis or petechiae   Skin: Warm, dry, normal color    LABS:      I&O's Summary    BNP  RADIOLOGY & ADDITIONAL STUDIES: [facility] -- Wang Gutierres, Lois, Peter, Mike, Hieu, Lashay Champagne: Office # [phone]    Patient is a 72 y/o male who presents with a chief complaint of chest tightness     HPI: Patient is a 72 y/o male with PMHx HTN, BPH presents to ED c/o chest tightness for approx 1 hour this AM from 7:30AM - 8:30 AM. Patient reports prior to bed last night he had some blurry vision which resolved spontaneously. Patient reports around 4AM he started to experience L sided head pounding for ~1-2 seconds. Denies associated dizziness or visual changes at that time. Around 7:30 AM this morning patient reports chest tightness that did not radiate, worse when he pressed down on the area. He denies associated HA, dizziness, diaphoresis, visual changes, SOB, LYMAN, palpitations, nausea or vomiting. Pain spontaneously resolved ~1 hour later. Patient denies similar episodes in the past. Follows regularly with PCP for HTN for which he is complaint with outpatient regimen. Former smoker, quit >25 years ago. Family hx significant for MI in father at 83 y/o from which he passed. Received 2/2 Pfizer Covid vaccines, last dose April 29, 2021. No recent travel, no sick contacts, no known Covid exposures.      PAST MEDICAL & SURGICAL HISTORY:  HTN  BPH    ECHO  FINDINGS:      MEDICATIONS  (STANDING):    MEDICATIONS  (PRN):      FAMILY HISTORY:  Father - MI 83 y/o  Mother- DMT2    REVIEW OF SYSTEMS:  Constitutional: denies fever, chills  HEENT: denies blurry vision, difficulty hearing  Respiratory: denies SOB, LYMAN, cough  Cardiovascular: admits chest tightness has resolved. denies palpitations, orthopnea, PND, LE edema  Gastrointestinal: denies nausea, vomiting, abdominal pain  Genitourinary: denies urinary changes  Skin: denies rashes, itching  Neurologic: denies headache, weakness, dizziness  Hematology/Oncology: denies bleeding, easy bruising  ROS negative except as noted above    SOCIAL HISTORY:  Former smoker, quit >25 years ago. No current EtOH or illicit drug use.     Vital Signs Last 24 Hrs  T(C): 36.8 (07 Jan 2022 09:47), Max: 36.8 (07 Jan 2022 09:47)  T(F): 98.2 (07 Jan 2022 09:47), Max: 98.2 (07 Jan 2022 09:47)  HR: 107 (07 Jan 2022 09:47) (107 - 107)  BP: 163/85 (07 Jan 2022 09:47) (163/85 - 163/85)  RR: 16 (07 Jan 2022 09:47) (16 - 16)  SpO2: 98% (07 Jan 2022 09:47) (98% - 98%)    Physical Exam:  General: Well developed, well nourished, NAD  HEENT: NCAT, PERRLA, EOMI b/l, moist mucous membranes   Neck: Supple, nontender, no mass  Neurology: A&Ox3, nonfocal, sensation intact   Respiratory: CTA b/l, no W/R/R  CV: Tachycardic, regula rhythm, +S1/S2, no murmurs, rubs or gallops  Abdominal: Soft, NT, ND +BSx4, no palpable masses  Extremities: trace to 1+ pitting edema LLE, no edema RLE, + peripheral pulses  MSK: Normal ROM, no joint erythema or warmth, no joint swelling   Heme: No obvious ecchymosis or petechiae   Skin: Warm, dry, normal color    LABS:                        14.5   5.05  )-----------( 229      ( 07 Jan 2022 10:21 )             41.7     07 Jan 2022 10:21    141    |  107    |  21     ----------------------------<  137    3.3     |  28     |  1.10     Ca    8.9        07 Jan 2022 10:21  Mg     2.4       07 Jan 2022 10:21    TPro  7.3    /  Alb  4.1    /  TBili  0.8    /  DBili  x      /  AST  17     /  ALT  27     /  AlkPhos  70     07 Jan 2022 10:21    LIVER FUNCTIONS - ( 07 Jan 2022 10:21 )  Alb: 4.1 g/dL / Pro: 7.3 g/dL / ALK PHOS: 70 U/L / ALT: 27 U/L / AST: 17 U/L / GGT: x             CAPILLARY BLOOD GLUCOSE      CARDIAC MARKERS ( 07 Jan 2022 10:21 )  x     / x     / 164 U/L / x     / x            I&O's Summary    BNP  RADIOLOGY & ADDITIONAL STUDIES: [facility] -- Wang Gutierres, Peter Abreu, Mike, Hieu, Lashay Champagne: Office # [phone]    Patient is a 70 y/o male who presents with a chief complaint of chest tightness     HPI: Patient is a 70 y/o male with PMHx HTN, BPH presents to ED c/o chest tightness for approx 1 hour this AM from 7:30AM - 8:30 AM. Patient reports prior to bed last night he had some blurry vision which resolved spontaneously. Patient reports around 4AM he started to experience L sided head pounding for ~1-2 seconds. Denies associated dizziness or visual changes at that time. Around 7:30 AM this morning patient reports chest tightness that did not radiate, worse when he pressed down on the area. He denies associated HA, dizziness, diaphoresis, visual changes, SOB, LYMAN, palpitations, nausea or vomiting. Pain spontaneously resolved ~1 hour later. Patient denies similar episodes in the past. Follows regularly with PCP for HTN for which he is complaint with outpatient regimen. Former smoker, quit >25 years ago. Family hx significant for MI in father at 81 y/o from which he passed. Received 2/2 Pfizer Covid vaccines, last dose April 29, 2021. No recent travel, no sick contacts, no known Covid exposures.      Further information obtained from daughter via telephone. Daughter reports patient went to PCP ~2 weeks ago, was told something was off and to follow up with cardiologist - does not know the reason for est care with cards. Daughter also reports patient's brother had an MI around ~70 from which he passed.     PAST MEDICAL & SURGICAL HISTORY:  HTN  BPH    ECHO  FINDINGS:      MEDICATIONS  (STANDING):    MEDICATIONS  (PRN):      FAMILY HISTORY:  Father - MI 81 y/o - passed  Brother - MI ~69 y/o - passed   Mother- DMT2    REVIEW OF SYSTEMS:  Constitutional: denies fever, chills  HEENT: denies blurry vision, difficulty hearing  Respiratory: denies SOB, LYMAN, cough  Cardiovascular: admits chest tightness has resolved. denies palpitations, orthopnea, PND, LE edema  Gastrointestinal: denies nausea, vomiting, abdominal pain  Genitourinary: denies urinary changes  Skin: denies rashes, itching  Neurologic: denies headache, weakness, dizziness  Hematology/Oncology: denies bleeding, easy bruising  ROS negative except as noted above    SOCIAL HISTORY:  Former smoker, quit >25 years ago. No current EtOH or illicit drug use.     Vital Signs Last 24 Hrs  T(C): 36.8 (07 Jan 2022 09:47), Max: 36.8 (07 Jan 2022 09:47)  T(F): 98.2 (07 Jan 2022 09:47), Max: 98.2 (07 Jan 2022 09:47)  HR: 107 (07 Jan 2022 09:47) (107 - 107)  BP: 163/85 (07 Jan 2022 09:47) (163/85 - 163/85)  RR: 16 (07 Jan 2022 09:47) (16 - 16)  SpO2: 98% (07 Jan 2022 09:47) (98% - 98%)    Physical Exam:  General: Well developed, well nourished, NAD  HEENT: NCAT, PERRLA, EOMI b/l, moist mucous membranes   Neck: Supple, nontender, no mass  Neurology: A&Ox3, nonfocal, sensation intact   Respiratory: CTA b/l, no W/R/R  CV: Tachycardic, regula rhythm, +S1/S2, no murmurs, rubs or gallops  Abdominal: Soft, NT, ND +BSx4, no palpable masses  Extremities: trace to 1+ pitting edema LLE, no edema RLE, + peripheral pulses  MSK: Normal ROM, no joint erythema or warmth, no joint swelling   Heme: No obvious ecchymosis or petechiae   Skin: Warm, dry, normal color    LABS:                        14.5   5.05  )-----------( 229      ( 07 Jan 2022 10:21 )             41.7     07 Jan 2022 10:21    141    |  107    |  21     ----------------------------<  137    3.3     |  28     |  1.10     Ca    8.9        07 Jan 2022 10:21  Mg     2.4       07 Jan 2022 10:21    TPro  7.3    /  Alb  4.1    /  TBili  0.8    /  DBili  x      /  AST  17     /  ALT  27     /  AlkPhos  70     07 Jan 2022 10:21    LIVER FUNCTIONS - ( 07 Jan 2022 10:21 )  Alb: 4.1 g/dL / Pro: 7.3 g/dL / ALK PHOS: 70 U/L / ALT: 27 U/L / AST: 17 U/L / GGT: x             CAPILLARY BLOOD GLUCOSE      CARDIAC MARKERS ( 07 Jan 2022 10:21 )  x     / x     / 164 U/L / x     / x            I&O's Summary    BNP  RADIOLOGY & ADDITIONAL STUDIES: [facility] -- Wang Gutierres, Peter Abreu, Hieu Mckeon, Lashay Champagne: Office # [phone]    Patient is a 70 y/o male who presents with a chief complaint of chest tightness     HPI: Patient is a 70 y/o male with PMHx HTN, BPH presents to ED c/o chest tightness for approx 1 hour this AM from 7:30AM - 8:30 AM. Patient reports prior to bed last night he had some blurry vision which resolved spontaneously. Patient reports around 4AM he started to experience L sided head pounding for ~1-2 seconds. Denies associated dizziness or visual changes at that time. Around 7:30 AM this morning patient reports chest tightness that did not radiate, worse when he pressed down on the area. He denies associated HA, dizziness, diaphoresis, visual changes, SOB, LYMAN, palpitations, nausea or vomiting. Pain spontaneously resolved ~1 hour later. Patient denies similar episodes in the past. Follows regularly with PCP for HTN for which he is complaint with outpatient regimen. Former smoker, quit >25 years ago. Family hx significant for MI in father at 81 y/o from which he passed. Received 2/2 Pfizer Covid vaccines, last dose April 29, 2021. No recent travel, no sick contacts, no known Covid exposures.      Further information obtained from daughter via telephone. Daughter reports patient went to PCP ~2 weeks ago, was told something was off and to follow up with cardiologist - does not know the reason for est care with cards. Daughter also reports patient's brother had an MI around ~70 from which he passed.     Called PCP's office. Reason for referral to cardiology was for diastolic dysfunction noted on tte. Request for records to be faxed to [facility] ED.     PAST MEDICAL & SURGICAL HISTORY:  HTN  BPH    ECHO  FINDINGS:      MEDICATIONS  (STANDING):    MEDICATIONS  (PRN):      FAMILY HISTORY:  Father - MI 81 y/o - passed  Brother - MI ~69 y/o - passed   Mother- DMT2    REVIEW OF SYSTEMS:  Constitutional: denies fever, chills  HEENT: denies blurry vision, difficulty hearing  Respiratory: denies SOB, LYMAN, cough  Cardiovascular: admits chest tightness has resolved. denies palpitations, orthopnea, PND, LE edema  Gastrointestinal: denies nausea, vomiting, abdominal pain  Genitourinary: denies urinary changes  Skin: denies rashes, itching  Neurologic: denies headache, weakness, dizziness  Hematology/Oncology: denies bleeding, easy bruising  ROS negative except as noted above    SOCIAL HISTORY:  Former smoker, quit >25 years ago. No current EtOH or illicit drug use.     Vital Signs Last 24 Hrs  T(C): 36.8 (07 Jan 2022 09:47), Max: 36.8 (07 Jan 2022 09:47)  T(F): 98.2 (07 Jan 2022 09:47), Max: 98.2 (07 Jan 2022 09:47)  HR: 107 (07 Jan 2022 09:47) (107 - 107)  BP: 163/85 (07 Jan 2022 09:47) (163/85 - 163/85)  RR: 16 (07 Jan 2022 09:47) (16 - 16)  SpO2: 98% (07 Jan 2022 09:47) (98% - 98%)    Physical Exam:  General: Well developed, well nourished, NAD  HEENT: NCAT, PERRLA, EOMI b/l, moist mucous membranes   Neck: Supple, nontender, no mass  Neurology: A&Ox3, nonfocal, sensation intact   Respiratory: CTA b/l, no W/R/R  CV: Tachycardic, regula rhythm, +S1/S2, no murmurs, rubs or gallops  Abdominal: Soft, NT, ND +BSx4, no palpable masses  Extremities: trace to 1+ pitting edema LLE, no edema RLE, + peripheral pulses  MSK: Normal ROM, no joint erythema or warmth, no joint swelling   Heme: No obvious ecchymosis or petechiae   Skin: Warm, dry, normal color    LABS:                        14.5   5.05  )-----------( 229      ( 07 Jan 2022 10:21 )             41.7     07 Jan 2022 10:21    141    |  107    |  21     ----------------------------<  137    3.3     |  28     |  1.10     Ca    8.9        07 Jan 2022 10:21  Mg     2.4       07 Jan 2022 10:21    TPro  7.3    /  Alb  4.1    /  TBili  0.8    /  DBili  x      /  AST  17     /  ALT  27     /  AlkPhos  70     07 Jan 2022 10:21    LIVER FUNCTIONS - ( 07 Jan 2022 10:21 )  Alb: 4.1 g/dL / Pro: 7.3 g/dL / ALK PHOS: 70 U/L / ALT: 27 U/L / AST: 17 U/L / GGT: x             CAPILLARY BLOOD GLUCOSE      CARDIAC MARKERS ( 07 Jan 2022 10:21 )  x     / x     / 164 U/L / x     / x            I&O's Summary    BNP  RADIOLOGY & ADDITIONAL STUDIES:

## 2022-01-07 NOTE — ED ADULT NURSE NOTE - NURSING MUSC STRENGTH
DAUGHTER REPORTS PT HAS DECREASED APPETITE X 3 DAYS hand grasp, leg strength strong and equal bilaterally

## 2022-01-07 NOTE — ED ADULT NURSE NOTE - OBJECTIVE STATEMENT
Pt received in bed alert and oriented with the c/o chest pain started this am and it is non-radiating. As per MD's orders IV sohan placed blood specimen obtained and sent to the lab. PT on monitor and SR to  noted. Nursing care ongoing and safety maintained.

## 2022-01-19 DIAGNOSIS — N40.0 BENIGN PROSTATIC HYPERPLASIA WITHOUT LOWER URINARY TRACT SYMPMS: ICD-10-CM

## 2022-01-19 DIAGNOSIS — R07.9 CHEST PAIN, UNSPECIFIED: ICD-10-CM

## 2022-01-19 RX ORDER — TAMSULOSIN HYDROCHLORIDE 0.4 MG/1
0.4 CAPSULE ORAL
Refills: 0 | Status: ACTIVE | COMMUNITY

## 2022-01-20 ENCOUNTER — APPOINTMENT (OUTPATIENT)
Dept: CARDIOLOGY | Facility: CLINIC | Age: 72
End: 2022-01-20
Payer: MEDICARE

## 2022-01-20 ENCOUNTER — NON-APPOINTMENT (OUTPATIENT)
Age: 72
End: 2022-01-20

## 2022-01-20 VITALS
OXYGEN SATURATION: 97 % | WEIGHT: 163 LBS | SYSTOLIC BLOOD PRESSURE: 171 MMHG | HEIGHT: 68 IN | HEART RATE: 98 BPM | DIASTOLIC BLOOD PRESSURE: 83 MMHG | BODY MASS INDEX: 24.71 KG/M2

## 2022-01-20 PROCEDURE — 99214 OFFICE O/P EST MOD 30 MIN: CPT

## 2022-01-20 PROCEDURE — 93000 ELECTROCARDIOGRAM COMPLETE: CPT

## 2022-01-20 RX ORDER — AMLODIPINE BESYLATE 10 MG/1
10 TABLET ORAL DAILY
Refills: 3 | Status: ACTIVE | COMMUNITY
Start: 2022-01-20

## 2022-01-20 RX ORDER — ONDANSETRON 4 MG/1
4 TABLET ORAL
Refills: 0 | Status: DISCONTINUED | COMMUNITY
End: 2022-01-20

## 2022-01-20 RX ORDER — OXYCODONE AND ACETAMINOPHEN 5; 325 MG/1; MG/1
5-325 TABLET ORAL
Qty: 30 | Refills: 0 | Status: DISCONTINUED | COMMUNITY
End: 2022-01-20

## 2022-01-20 RX ORDER — CEFUROXIME AXETIL 500 MG/1
500 TABLET ORAL
Refills: 0 | Status: DISCONTINUED | COMMUNITY
End: 2022-01-20

## 2022-01-25 ENCOUNTER — APPOINTMENT (OUTPATIENT)
Dept: CARDIOLOGY | Facility: CLINIC | Age: 72
End: 2022-01-25
Payer: MEDICARE

## 2022-01-25 PROCEDURE — 93015 CV STRESS TEST SUPVJ I&R: CPT

## 2022-09-16 NOTE — ED PROVIDER NOTE - TEMPLATE
2022       Keiry Gamez MD  [address]  [address]  [facility] [address]  Via Fax: [phone]      Patient: Wesotn Henderson   YOB: 2020   Date of Visit: 2022       Dear Dr. Gamez:    I saw your patient, Weston Henderson, for an evaluation. Below are my notes for this visit with him.    If you have questions, please do not hesitate to call me.      Sincerely,        Suzan Storey MD        CC: No Recipients  Suzan Storey MD  2022 10:26 AM  Signed  Subjective:    Reason for Consult (Chief Complaint):   Chief Complaint   Patient presents with   • Office Visit   • Ear Problem       EVE Ontiveros is an 19 month old male who I was asked by Keiry Gamez MD to see in consultation today for evaluation of ear infections.    He was born full term to no past medical history, passed his  hearing test. No family history of childhood hearing loss.     In the last year, he has had 9 infections and 3 in the past 6 months. They are on their 11th course of antibiotics between amoxicillin and augmentin. No otorrhea. Has fever and pain with pulling with episodes. Frequent colds and congestion, they are not sure if he snores. Has used saline in the past, not flonase for growth concerns. No speech or hearing concerns.     Patient's medications, allergies, past medical, surgical, social and family histories were reviewed and updated as appropriate.    Birth History   • Birth     Length: 20.47\" (52 cm)     Weight: 3.67 kg (8 lb 1.5 oz)     HC 34 cm (13.39\")   • Apgar     One: 8     Five: 9   • Delivery Method: Vaginal, Spontaneous   • Gestation Age: 40 3/7 wks   • Duration of Labor: 1st: 5h 28m / 2nd: 4m     Past Medical History:   Diagnosis Date   • No known problems      Past Surgical History:   Procedure Laterality Date   • Circumcision surg excision <28 days          Family History   Problem Relation Age of Onset   • Patient is unaware of any medical problems Father         Social History     Socioeconomic History   • Marital status: Single     Spouse name: Not on file   • Number of children: Not on file   • Years of education: Not on file   • Highest education level: Not on file   Occupational History   • Not on file   Tobacco Use   • Smoking status: Never Smoker   • Smokeless tobacco: Never Used   Substance and Sexual Activity   • Alcohol use: Not on file   • Drug use: Not on file   • Sexual activity: Not on file   Other Topics Concern   • Not on file   Social History Narrative   • Not on file     Social Determinants of Health     Financial Resource Strain: Not on file   Food Insecurity: Not on file   Transportation Needs: Not on file   Physical Activity: Not on file   Stress: Not on file   Social Connections: Not on file   Intimate Partner Violence: Not on file       No current outpatient medications on file.     No current facility-administered medications for this visit.       ALLERGIES:  No Known Allergies    Constitutional: negative for chills, fevers, sweats and weight loss  Eyes: negative for visual disturbance  Ears, nose, mouth, throat, and face: as per HPI  Respiratory: negative for cough, stridor and wheezing  Cardiovascular: negative for chest pain, irregular heart beat and palpitations  Gastrointestinal: negative for abdominal pain, change in bowel habits, diarrhea, nausea and vomiting  Genitourinary:negative for dysuria  Hematologic/lymphatic: negative for bleeding, easy bruising and lymphadenopathy  Musculoskeletal:negative for arthralgias, muscle weakness, neck pain and stiff joints  Neurological: negative for coordination problems, headaches, vertigo and weakness  Endocrine: negative for diabetic symptoms including blurry vision, increased fatigue, polydipsia, polyphagia and polyuria  Allergic/Immunologic: negative for anaphylaxis and angioedema    Objective:    Vital Signs:   Visit Vitals  Temp 97.1 °F (36.2 °C)   Wt 12.3 kg (27 lb 1.9 oz)        Constitutional: well developed, well nourished, no acute distress, absence of stridor, absence of stertor  Head and Face: facial movement is normal and symmetrical, no craniofacial deformities, no scars, lesions or masses, salivary glands were normal, no dysmorphic features  Eyes: PERRL, EOM's intact and symmetric, no nystagmus  Ears: normal appearing and symmetric pinnae, mastoids non-tender, external canals patent with normal amount of cerumen, tympanic membranes intact bilaterally without erythema and without bulging, right tympanic membrane with  middle ear effusion, left tympanic membrane with  middle ear effusion  Nose: moving air, no nasal bone tenderness, external nose showed no deformities, rhinorrhea absent, mucosa normal, septum not deviated  Oral Cavity/Oropharynx: no intraoral lesions, floor of mouth soft, no pharyngeal swelling, no pharyngeal erythema, palate intact and symmetrical, tongue normal, tonsils 2-3+  Voice quality: within normal limits  Neck/glands: soft and supple with full range of motion, trachea midline, no masses  Lymphatic: normal lymph nodes of head and neck  Respiratory: symmetric chest excursion, no retractions and easy work of breathing  CV: strong peripheral pulses and warm extremities  Neuro: CN II-XII grossly intact and symmetric bilaterally  Skin: skin color, texture, turgor normal, no rashes or lesions  Psychiatric: appropriate, interactive and cooperative         Assessment:    Weston Henderson is a 19 month old male with a history of recurrent acute otitis media, eustachian tube dysfunction, chronic otitis media with effusion and nasal congestion.      Diagnoses:    (H66.006) Recurrent acute suppurative otitis media without spontaneous rupture of tympanic membrane of both sides  (primary encounter diagnosis)    (H69.83) Eustachian tube dysfunction, bilateral    (H65.33) Chronic mucoid otitis media of both ears    (R09.81) Nasal congestion        Plan:    I would  recommend bilateral myringotomies and tubes and adenoidectomy at this time. He needs a urological procedure with Dr. Leigh so we will coordinate a time for surgery.     Surgical procedure, with its commonly associated risks and benefits, was discussed.  Alternatives to surgery as well as risks of not treating were also discussed.  Questions were answered.  Parent / Legal guardian understands and wishes to proceed.      The risks of myringotomy and tubes discussed today included, but were not limited to, infection, bleeding, tube plugging, tube falling out too soon, tube not falling out, hearing loss, persistent tympanic membrane perforation, need for additional surgeries, infection with ear drainage and death.     The risks of adenoidectomy discussed today included, but were not limited to, infection, bleeding, severe bleeding needing additional management or surgery, velopharyngeal insufficiency with air escape through the nose during speech, voice change, temporary neck stiffness, injury to teeth, lips or gums, regrowth of adenoid tissue, need for further surgery and death.     The risks and benefits of my recommendations as well as other treatment options were discussed with the mother. Questions were answered.        Suzan Storey MD     A copy of my note has been sent to the referring physician.    Disclaimer: This note was generated using voice recognition technology. Efforts are made to proofread the final product, however minor errors in transcription may be present. Please contact my office should any questions regarding content arise.      Abdominal Pain, N/V/D

## 2023-04-14 NOTE — ED ADULT NURSE NOTE - BOWEL SOUNDS RUQ
present
[FreeTextEntry1] : continue medications \par further instructions pending lab results \par follow up  Neurologist\par

## 2023-09-17 ENCOUNTER — INPATIENT (INPATIENT)
Facility: HOSPITAL | Age: 73
LOS: 8 days | Discharge: HOME | DRG: 840 | End: 2023-09-26
Attending: HOSPITALIST | Admitting: STUDENT IN AN ORGANIZED HEALTH CARE EDUCATION/TRAINING PROGRAM
Payer: MEDICARE

## 2023-09-17 ENCOUNTER — TRANSCRIPTION ENCOUNTER (OUTPATIENT)
Age: 73
End: 2023-09-17

## 2023-09-17 VITALS
DIASTOLIC BLOOD PRESSURE: 67 MMHG | WEIGHT: 169.98 LBS | OXYGEN SATURATION: 100 % | HEART RATE: 86 BPM | SYSTOLIC BLOOD PRESSURE: 118 MMHG | HEIGHT: 66 IN | RESPIRATION RATE: 16 BRPM | TEMPERATURE: 98 F

## 2023-09-17 DIAGNOSIS — M48.50XA COLLAPSED VERTEBRA, NOT ELSEWHERE CLASSIFIED, SITE UNSPECIFIED, INITIAL ENCOUNTER FOR FRACTURE: ICD-10-CM

## 2023-09-17 DIAGNOSIS — D64.9 ANEMIA, UNSPECIFIED: ICD-10-CM

## 2023-09-17 DIAGNOSIS — D49.0 NEOPLASM OF UNSPECIFIED BEHAVIOR OF DIGESTIVE SYSTEM: ICD-10-CM

## 2023-09-17 DIAGNOSIS — R93.89 ABNORMAL FINDINGS ON DIAGNOSTIC IMAGING OF OTHER SPECIFIED BODY STRUCTURES: ICD-10-CM

## 2023-09-17 DIAGNOSIS — Z29.9 ENCOUNTER FOR PROPHYLACTIC MEASURES, UNSPECIFIED: ICD-10-CM

## 2023-09-17 DIAGNOSIS — N40.0 BENIGN PROSTATIC HYPERPLASIA WITHOUT LOWER URINARY TRACT SYMPTOMS: ICD-10-CM

## 2023-09-17 DIAGNOSIS — N17.9 ACUTE KIDNEY FAILURE, UNSPECIFIED: ICD-10-CM

## 2023-09-17 DIAGNOSIS — Z90.49 ACQUIRED ABSENCE OF OTHER SPECIFIED PARTS OF DIGESTIVE TRACT: Chronic | ICD-10-CM

## 2023-09-17 DIAGNOSIS — R74.8 ABNORMAL LEVELS OF OTHER SERUM ENZYMES: ICD-10-CM

## 2023-09-17 DIAGNOSIS — I10 ESSENTIAL (PRIMARY) HYPERTENSION: ICD-10-CM

## 2023-09-17 LAB
ALBUMIN SERPL ELPH-MCNC: 4 G/DL — SIGNIFICANT CHANGE UP (ref 3.3–5)
ALP SERPL-CCNC: 63 U/L — SIGNIFICANT CHANGE UP (ref 40–120)
ALT FLD-CCNC: 32 U/L — SIGNIFICANT CHANGE UP (ref 12–78)
ANION GAP SERPL CALC-SCNC: 7 MMOL/L — SIGNIFICANT CHANGE UP (ref 5–17)
ANION GAP SERPL CALC-SCNC: 9 MMOL/L — SIGNIFICANT CHANGE UP (ref 5–17)
APPEARANCE UR: CLEAR — SIGNIFICANT CHANGE UP
APPEARANCE UR: CLEAR — SIGNIFICANT CHANGE UP
APTT BLD: 31.5 SEC — SIGNIFICANT CHANGE UP (ref 24.5–35.6)
AST SERPL-CCNC: 21 U/L — SIGNIFICANT CHANGE UP (ref 15–37)
BASOPHILS # BLD AUTO: 0.03 K/UL — SIGNIFICANT CHANGE UP (ref 0–0.2)
BASOPHILS NFR BLD AUTO: 0.5 % — SIGNIFICANT CHANGE UP (ref 0–2)
BILIRUB SERPL-MCNC: 0.4 MG/DL — SIGNIFICANT CHANGE UP (ref 0.2–1.2)
BILIRUB UR-MCNC: NEGATIVE — SIGNIFICANT CHANGE UP
BILIRUB UR-MCNC: NEGATIVE — SIGNIFICANT CHANGE UP
BUN SERPL-MCNC: 59 MG/DL — HIGH (ref 7–23)
BUN SERPL-MCNC: 64 MG/DL — HIGH (ref 7–23)
CALCIUM SERPL-MCNC: 9.2 MG/DL — SIGNIFICANT CHANGE UP (ref 8.5–10.1)
CALCIUM SERPL-MCNC: 9.8 MG/DL — SIGNIFICANT CHANGE UP (ref 8.5–10.1)
CHLORIDE SERPL-SCNC: 106 MMOL/L — SIGNIFICANT CHANGE UP (ref 96–108)
CHLORIDE SERPL-SCNC: 110 MMOL/L — HIGH (ref 96–108)
CO2 SERPL-SCNC: 25 MMOL/L — SIGNIFICANT CHANGE UP (ref 22–31)
CO2 SERPL-SCNC: 26 MMOL/L — SIGNIFICANT CHANGE UP (ref 22–31)
COLOR SPEC: YELLOW — SIGNIFICANT CHANGE UP
COLOR SPEC: YELLOW — SIGNIFICANT CHANGE UP
CREAT SERPL-MCNC: 5 MG/DL — HIGH (ref 0.5–1.3)
CREAT SERPL-MCNC: 5.7 MG/DL — HIGH (ref 0.5–1.3)
DIFF PNL FLD: ABNORMAL
DIFF PNL FLD: ABNORMAL
EGFR: 10 ML/MIN/1.73M2 — LOW
EGFR: 12 ML/MIN/1.73M2 — LOW
EOSINOPHIL # BLD AUTO: 0.08 K/UL — SIGNIFICANT CHANGE UP (ref 0–0.5)
EOSINOPHIL NFR BLD AUTO: 1.4 % — SIGNIFICANT CHANGE UP (ref 0–6)
GLUCOSE SERPL-MCNC: 108 MG/DL — HIGH (ref 70–99)
GLUCOSE SERPL-MCNC: 129 MG/DL — HIGH (ref 70–99)
GLUCOSE UR QL: NEGATIVE MG/DL — SIGNIFICANT CHANGE UP
GLUCOSE UR QL: NEGATIVE MG/DL — SIGNIFICANT CHANGE UP
HCT VFR BLD CALC: 26.8 % — LOW (ref 39–50)
HGB BLD-MCNC: 8.9 G/DL — LOW (ref 13–17)
IMM GRANULOCYTES NFR BLD AUTO: 0.5 % — SIGNIFICANT CHANGE UP (ref 0–0.9)
INR BLD: 0.95 RATIO — SIGNIFICANT CHANGE UP (ref 0.85–1.18)
KETONES UR-MCNC: NEGATIVE MG/DL — SIGNIFICANT CHANGE UP
KETONES UR-MCNC: NEGATIVE MG/DL — SIGNIFICANT CHANGE UP
LEUKOCYTE ESTERASE UR-ACNC: NEGATIVE — SIGNIFICANT CHANGE UP
LEUKOCYTE ESTERASE UR-ACNC: NEGATIVE — SIGNIFICANT CHANGE UP
LIDOCAIN IGE QN: 106 U/L — HIGH (ref 13–75)
LYMPHOCYTES # BLD AUTO: 2.82 K/UL — SIGNIFICANT CHANGE UP (ref 1–3.3)
LYMPHOCYTES # BLD AUTO: 49.4 % — HIGH (ref 13–44)
MCHC RBC-ENTMCNC: 29.3 PG — SIGNIFICANT CHANGE UP (ref 27–34)
MCHC RBC-ENTMCNC: 33.2 GM/DL — SIGNIFICANT CHANGE UP (ref 32–36)
MCV RBC AUTO: 88.2 FL — SIGNIFICANT CHANGE UP (ref 80–100)
MONOCYTES # BLD AUTO: 0.35 K/UL — SIGNIFICANT CHANGE UP (ref 0–0.9)
MONOCYTES NFR BLD AUTO: 6.1 % — SIGNIFICANT CHANGE UP (ref 2–14)
NEUTROPHILS # BLD AUTO: 2.4 K/UL — SIGNIFICANT CHANGE UP (ref 1.8–7.4)
NEUTROPHILS NFR BLD AUTO: 42.1 % — LOW (ref 43–77)
NITRITE UR-MCNC: NEGATIVE — SIGNIFICANT CHANGE UP
NITRITE UR-MCNC: NEGATIVE — SIGNIFICANT CHANGE UP
NRBC # BLD: 0 /100 WBCS — SIGNIFICANT CHANGE UP (ref 0–0)
PH UR: 6 — SIGNIFICANT CHANGE UP (ref 5–8)
PH UR: 6 — SIGNIFICANT CHANGE UP (ref 5–8)
PLATELET # BLD AUTO: 190 K/UL — SIGNIFICANT CHANGE UP (ref 150–400)
POTASSIUM SERPL-MCNC: 3.8 MMOL/L — SIGNIFICANT CHANGE UP (ref 3.5–5.3)
POTASSIUM SERPL-MCNC: 3.9 MMOL/L — SIGNIFICANT CHANGE UP (ref 3.5–5.3)
POTASSIUM SERPL-SCNC: 3.8 MMOL/L — SIGNIFICANT CHANGE UP (ref 3.5–5.3)
POTASSIUM SERPL-SCNC: 3.9 MMOL/L — SIGNIFICANT CHANGE UP (ref 3.5–5.3)
PROT SERPL-MCNC: 6 G/DL — SIGNIFICANT CHANGE UP (ref 6–8.3)
PROT SERPL-MCNC: 6 G/DL — SIGNIFICANT CHANGE UP (ref 6–8.3)
PROT SERPL-MCNC: 7.6 G/DL — SIGNIFICANT CHANGE UP (ref 6–8.3)
PROT UR-MCNC: 100 MG/DL
PROT UR-MCNC: 100 MG/DL
PROTHROM AB SERPL-ACNC: 11.1 SEC — SIGNIFICANT CHANGE UP (ref 9.5–13)
RBC # BLD: 3.04 M/UL — LOW (ref 4.2–5.8)
RBC # FLD: 12.5 % — SIGNIFICANT CHANGE UP (ref 10.3–14.5)
SARS-COV-2 RNA SPEC QL NAA+PROBE: SIGNIFICANT CHANGE UP
SODIUM SERPL-SCNC: 141 MMOL/L — SIGNIFICANT CHANGE UP (ref 135–145)
SODIUM SERPL-SCNC: 142 MMOL/L — SIGNIFICANT CHANGE UP (ref 135–145)
SP GR SPEC: 1.01 — SIGNIFICANT CHANGE UP (ref 1–1.03)
SP GR SPEC: 1.01 — SIGNIFICANT CHANGE UP (ref 1–1.03)
T4 AB SER-ACNC: 7.5 UG/DL — SIGNIFICANT CHANGE UP (ref 4.6–12)
T4/T3 UPTAKE INDEX SERPL: 1 TBI — SIGNIFICANT CHANGE UP (ref 0.8–1.3)
URATE SERPL-MCNC: 10.3 MG/DL — HIGH (ref 3.4–8.8)
UROBILINOGEN FLD QL: 1 MG/DL — SIGNIFICANT CHANGE UP (ref 0.2–1)
UROBILINOGEN FLD QL: 1 MG/DL — SIGNIFICANT CHANGE UP (ref 0.2–1)
WBC # BLD: 5.71 K/UL — SIGNIFICANT CHANGE UP (ref 3.8–10.5)
WBC # FLD AUTO: 5.71 K/UL — SIGNIFICANT CHANGE UP (ref 3.8–10.5)

## 2023-09-17 PROCEDURE — 99285 EMERGENCY DEPT VISIT HI MDM: CPT | Mod: FS

## 2023-09-17 PROCEDURE — 70450 CT HEAD/BRAIN W/O DYE: CPT | Mod: 26,MA

## 2023-09-17 PROCEDURE — 99222 1ST HOSP IP/OBS MODERATE 55: CPT | Mod: GC

## 2023-09-17 PROCEDURE — 74177 CT ABD & PELVIS W/CONTRAST: CPT | Mod: 26,MA

## 2023-09-17 PROCEDURE — 71260 CT THORAX DX C+: CPT | Mod: 26,MA

## 2023-09-17 RX ORDER — LIDOCAINE 4 G/100G
1 CREAM TOPICAL EVERY 24 HOURS
Refills: 0 | Status: DISCONTINUED | OUTPATIENT
Start: 2023-09-17 | End: 2023-09-26

## 2023-09-17 RX ORDER — SODIUM CHLORIDE 9 MG/ML
1000 INJECTION INTRAMUSCULAR; INTRAVENOUS; SUBCUTANEOUS ONCE
Refills: 0 | Status: COMPLETED | OUTPATIENT
Start: 2023-09-17 | End: 2023-09-17

## 2023-09-17 RX ORDER — POLYETHYLENE GLYCOL 3350 17 G/17G
17 POWDER, FOR SOLUTION ORAL DAILY
Refills: 0 | Status: DISCONTINUED | OUTPATIENT
Start: 2023-09-17 | End: 2023-09-26

## 2023-09-17 RX ORDER — SODIUM CHLORIDE 9 MG/ML
1000 INJECTION, SOLUTION INTRAVENOUS
Refills: 0 | Status: DISCONTINUED | OUTPATIENT
Start: 2023-09-17 | End: 2023-09-22

## 2023-09-17 RX ORDER — TAMSULOSIN HYDROCHLORIDE 0.4 MG/1
0.4 CAPSULE ORAL AT BEDTIME
Refills: 0 | Status: DISCONTINUED | OUTPATIENT
Start: 2023-09-17 | End: 2023-09-26

## 2023-09-17 RX ORDER — ACETAMINOPHEN 500 MG
650 TABLET ORAL EVERY 6 HOURS
Refills: 0 | Status: DISCONTINUED | OUTPATIENT
Start: 2023-09-17 | End: 2023-09-26

## 2023-09-17 RX ORDER — PANTOPRAZOLE SODIUM 20 MG/1
40 TABLET, DELAYED RELEASE ORAL
Refills: 0 | Status: DISCONTINUED | OUTPATIENT
Start: 2023-09-17 | End: 2023-09-19

## 2023-09-17 RX ORDER — AMLODIPINE BESYLATE 2.5 MG/1
10 TABLET ORAL DAILY
Refills: 0 | Status: DISCONTINUED | OUTPATIENT
Start: 2023-09-17 | End: 2023-09-19

## 2023-09-17 RX ORDER — HEPARIN SODIUM 5000 [USP'U]/ML
5000 INJECTION INTRAVENOUS; SUBCUTANEOUS EVERY 12 HOURS
Refills: 0 | Status: DISCONTINUED | OUTPATIENT
Start: 2023-09-17 | End: 2023-09-20

## 2023-09-17 RX ORDER — AMLODIPINE BESYLATE 2.5 MG/1
10 TABLET ORAL DAILY
Refills: 0 | Status: DISCONTINUED | OUTPATIENT
Start: 2023-09-17 | End: 2023-09-17

## 2023-09-17 RX ADMIN — LIDOCAINE 1 PATCH: 4 CREAM TOPICAL at 18:36

## 2023-09-17 RX ADMIN — SODIUM CHLORIDE 1000 MILLILITER(S): 9 INJECTION INTRAMUSCULAR; INTRAVENOUS; SUBCUTANEOUS at 14:39

## 2023-09-17 RX ADMIN — LIDOCAINE 1 PATCH: 4 CREAM TOPICAL at 19:09

## 2023-09-17 RX ADMIN — SODIUM CHLORIDE 75 MILLILITER(S): 9 INJECTION, SOLUTION INTRAVENOUS at 18:36

## 2023-09-17 RX ADMIN — SODIUM CHLORIDE 1000 MILLILITER(S): 9 INJECTION INTRAMUSCULAR; INTRAVENOUS; SUBCUTANEOUS at 16:41

## 2023-09-17 RX ADMIN — TAMSULOSIN HYDROCHLORIDE 0.4 MILLIGRAM(S): 0.4 CAPSULE ORAL at 21:00

## 2023-09-17 RX ADMIN — SODIUM CHLORIDE 75 MILLILITER(S): 9 INJECTION, SOLUTION INTRAVENOUS at 16:47

## 2023-09-17 NOTE — ED PROVIDER NOTE - CONSIDERATION OF ADMISSION OBSERVATION
Consideration of Admission/Observation may need admission pending any CT abnormalities or sig electrolyte abnormality's such as WILY or anemia

## 2023-09-17 NOTE — H&P ADULT - NSICDXPASTSURGICALHX_GEN_ALL_CORE_FT
PAST SURGICAL HISTORY:  No significant past surgical history      PAST SURGICAL HISTORY:  History of appendectomy

## 2023-09-17 NOTE — H&P ADULT - PROBLEM SELECTOR PLAN 4
- Lipase 106  - IV fluids CT chest with IV contrast: Age indeterminate compression fracture deformity involving the T8 vertebral body.     - no point tenderness on exam  - encourage ambulation  - consider tylenol if pain occurs CT chest with IV contrast: Age indeterminate compression fracture deformity involving the T8 vertebral body.   - Pt with no point tenderness on exam  - Tylenol PRN for pain  - Lidocaine patch   - PT consult

## 2023-09-17 NOTE — H&P ADULT - ASSESSMENT
74 yo male with hx of HTN of HTN and BPH, presents with fatigue, anorexia, and weight loss x 1 month, admitted for new gastric malignancy and anemia. 74 yo male with hx of HTN of HTN and BPH, presents with fatigue, anorexia, and weight loss x 1 month, admitted for new gastric mass, anemia and WILY.

## 2023-09-17 NOTE — ED PROVIDER NOTE - DIFFERENTIAL DIAGNOSIS
Differentials include but not limited to anemia, dehydration, electrolyte abnormality, mass, metastasis, multiple myeloma, infection, vertigo Differential Diagnosis

## 2023-09-17 NOTE — H&P ADULT - NSHPREVIEWOFSYSTEMS_GEN_ALL_CORE
REVIEW OF SYSTEMS:  CONSTITUTIONAL: No fever, chills or fatigue.  HENMT: No HA, dizziness, rhinorrhea  RESPIRATORY: No cough or shortness of breath.  CARDIOVASCULAR: No chest pain, palpitations.  GASTROINTESTINAL: No abdominal pain. No nausea or vomiting; No diarrhea or constipation. No blood per rectum.  GENITOURINARY: No dysuria, changes in frequency, hematuria, or incontinence  NEUROLOGICAL: Baseline strength. Sensation intact bilaterally.  SKIN: No itching, rashes  MUSCULOSKELETAL: No joint pain or swelling; No muscle, back, or extremity pain 2 REVIEW OF SYSTEMS:  CONSTITUTIONAL: +fatigue, No fever, chills  HENMT: No HA, dizziness, rhinorrhea  RESPIRATORY: No cough or shortness of breath.  CARDIOVASCULAR: No chest pain, palpitations.  GASTROINTESTINAL: +constipation, No abdominal pain. No nausea or vomiting; No diarrhea, No blood per rectum.  GENITOURINARY: No dysuria, changes in frequency, hematuria, or incontinence  NEUROLOGICAL: decreased strength. Sensation intact bilaterally.  SKIN: No itching, rashes  MUSCULOSKELETAL: No joint pain or swelling; No muscle, back, or extremity pain REVIEW OF SYSTEMS:  CONSTITUTIONAL: +fatigue, No fever, chills, +weakness  HENMT: No HA, dizziness, rhinorrhea  RESPIRATORY: No cough or shortness of breath.  CARDIOVASCULAR: No chest pain, palpitations.  GASTROINTESTINAL: +constipation, No abdominal pain. No nausea or vomiting; No diarrhea, No melena or hematochezia   GENITOURINARY: No dysuria, changes in frequency, hematuria, or incontinence  NEUROLOGICAL: +decreased strength. Sensation intact bilaterally. +lightheadedness  SKIN: No itching, rashes  MUSCULOSKELETAL: No joint pain or swelling; +back pain

## 2023-09-17 NOTE — H&P ADULT - HISTORY OF PRESENT ILLNESS
73-year-old male with history of hypertension and BPH presents with generalized fatigue, anorexia, and weight loss for the past month.  Patient has been visiting the [place] since early July.  Daughter reports generalized lethargy, fatigue, generalized weakness, no appetite, and weight loss the past month.  Daughter reports about 15 to 20 pound weight loss the past month.  Patient denies any headache, chest pain, shortness of breath, abdominal pain, nausea, vomiting, diarrhea.  Denies any black or bloody stools.  Also reports some generalized lightheadedness the past month.  Denies any spinning of room sensation.  Will occasionally be unsteady on his feet due to lightheadedness.  Denies any one-sided weakness.  Denies any difficulty speaking, finding words, vision changes.  Patient does report some pain in his upper back since July.  Daughter reports was diagnosed with compression fracture.  Denies any specific injury or trauma.  Denies any increase in back pain.  Was seen in hospital at [facility] and yesterday.  Was told hemoglobin 8.7 and recommended transfusion at that time.  Daughter did not want patient transfused and [place] so brought him back to [place] last night.  Daughter states other lab work unremarkable, urine unremarkable,  CT head unremarkable.    ED COURSE:  Vitals: T 98.2, HR 86, /67, RR 16, SpO2 100% RA  Labs significant for: Hgb/Hct 8.9/26.8, BUN/Cr 64/5.70, K 3.8, Lipase 106  U/A: Moderate blood, Nitrite neg, Leuk Esterase Neg, occasional bacteria, squamous cells present  COVID neg    Imaging:  -CT head noncontrast: 1. No evidence of acute territorial infarction, hemorrhage or mass effect. 2. Findings most concordant with chronic microvascular ischemic change.   -CT Chest, Abd/Pelvis with IV contrast: Exophytic lesion arising from the gastric fundus with interval increase in size since CT 8/16/2021. This may represent a solid mass such as a GIST. Differential includes gastric diverticulum, which is considered less likely. Consider CT with oral contrast for further evaluation.   Age indeterminate compression fracture deformity involving the T8 vertebral body. Correlate for point tenderness.    EKG: Normal sinus rhythm rate 76, Normal ECG When compared with ECG of 07-JAN-2022 10:24, No significant change was found Confirmed by Chantal Metz ([id number]) on 9/17/2023 2:37:49 PM  Pt received:  73-year-old male with history of hypertension and BPH presents with generalized fatigue, anorexia, and weight loss for the past month.  Patient has been visiting the [place] since early July.  Daughter reports generalized lethargy, fatigue, generalized weakness, no appetite, and weight loss the past month.  Daughter reports about 15 to 20 pound weight loss the past month.  Patient denies any headache, chest pain, shortness of breath, abdominal pain, nausea, vomiting, diarrhea.  Denies any black or bloody stools.  Also reports some generalized lightheadedness the past month.  Denies any spinning of room sensation.  Will occasionally be unsteady on his feet due to lightheadedness.  Denies any one-sided weakness.  Denies any difficulty speaking, finding words, vision changes.  Patient does report some pain in his upper back since July.  Daughter reports was diagnosed with compression fracture.  Denies any specific injury or trauma.  Denies any increase in back pain.  Was seen in hospital at [facility] and yesterday.  Was told hemoglobin 8.7 and recommended transfusion at that time.  Daughter did not want patient transfused and [place] so brought him back to [place] last night.  Daughter states other lab work unremarkable, urine unremarkable,  CT head unremarkable.    ED COURSE:  Vitals: T 98.2, HR 86, /67, RR 16, SpO2 100% RA  Labs significant for: Hgb/Hct 8.9/26.8, BUN/Cr 64/5.70, K 3.8, Lipase 106  U/A: Moderate blood, Nitrite neg, Leuk Esterase Neg, occasional bacteria, squamous cells present  COVID neg  Imaging:  -CT head noncontrast: 1. No evidence of acute territorial infarction, hemorrhage or mass effect. 2. Findings most concordant with chronic microvascular ischemic change.   -CT Chest, Abd/Pelvis with IV contrast: Exophytic lesion arising from the gastric fundus with interval increase in size since CT 8/16/2021. This may represent a solid mass such as a GIST. Differential includes gastric diverticulum, which is considered less likely. Consider CT with oral contrast for further evaluation.   Age indeterminate compression fracture deformity involving the T8 vertebral body. Correlate for point tenderness.  EKG: Normal sinus rhythm rate 76, Normal ECG When compared with ECG of 07-JAN-2022 10:24, No significant change was found Confirmed by Chantal Metz ([id number]) on 9/17/2023 2:37:49 PM  Pt received:  1 L NS bolus x1, started on sodium chloride 0.45% at 75 cc/hr 73-year-old male with PMH of hypertension and BPH presents with generalized fatigue, anorexia, and weight loss for the past month.  Daughter Silvia at bedside,  offered and declined, patient preferred Daughter to translate. Patient has been visiting the [place] since early July.  Daughter reports generalized lethargy, fatigue, generalized weakness, no appetite, and weight loss the past month. Unsure of exact amount of weight loss, but visible loss of "belly size". Denies abdominal pain, vomiting, diarrhea. Reports some constipation and small marble-like stool. Denies black or bloody stool. Last colonoscopy 10yrs ago which was unremarkable as per pt. Per daughter patient is usually able to complete ADLs independently and walk around without assistance, but recently has required to hold onto walls for support while walking. Endorses pain in R subscapular area for over the past month. Was seen in hospital at [facility] for evaluation of his weakness and was told hemoglobin was low at 8.7. He was recommended transfusion at that time which the pt and daughter declined. Pt and daughter flew back to [place] earlier today for further workup. Daughter states other lab work unremarkable, urine unremarkable,    ED COURSE:  Vitals: T 98.2, HR 86, /67, RR 16, SpO2 100% RA  Labs significant for: Hgb/Hct 8.9/26.8, BUN/Cr 64/5.70, K 3.8, Lipase 106  U/A: Moderate blood, Nitrite neg, Leuk Esterase Neg, occasional bacteria, squamous cells present  COVID neg  Imaging:  -CT head noncontrast: 1. No evidence of acute territorial infarction, hemorrhage or mass effect. 2. Findings most concordant with chronic microvascular ischemic change.   -CT Chest, Abd/Pelvis with IV contrast: Exophytic lesion arising from the gastric fundus with interval increase in size since CT 8/16/2021. This may represent a solid mass such as a GIST. Differential includes gastric diverticulum, which is considered less likely. Consider CT with oral contrast for further evaluation.   Age indeterminate compression fracture deformity involving the T8 vertebral body. Correlate for point tenderness.  EKG: Normal sinus rhythm rate 76, Normal ECG When compared with ECG of 07-JAN-2022 10:24, No significant change was found Confirmed by Chantal Metz ([id number]) on 9/17/2023 2:37:49 PM  Pt received:  1 L NS bolus x1, started on sodium chloride 0.45% at 75 cc/hr 73-year-old male with PMH of hypertension and BPH presents with generalized fatigue, anorexia, and weight loss for the past month.  Daughter Silvia at bedside,  offered and declined, patient preferred Daughter to translate. Patient has been visiting the [place] since early July.  Daughter reports generalized lethargy, fatigue, generalized weakness, lightheadedness, loss of appetite, and weight loss for the past month. Unsure of exact amount of weight loss, about ~15 lb weight loss, but visible loss of "belly size". Denies abdominal pain, nausea, vomiting, diarrhea. Reports some constipation since yesterday and small marble-like stool. Denies melena, hematochezia, hematuria, hemoptysis. Last colonoscopy was 10yrs ago which was unremarkable as per pt, pt did have an endoscopy "many years ago" which was also unremarkable. Denies FH colon cancer or gastric cancer. Per daughter patient is usually able to complete ADLs independently and walk around without assistance, but recently over the past month has required to hold onto walls for support while walking. Denies fever, chills, body aches, cp, sob, palpitations, urinary sx, headache, dizziness, bowel or bladder incontinence.   Earlier in the summer, pt was having back pain, was seen seen in a hospital in the [facility], was told that he had a compression fracture at that time. Pt does endorse pain in R subscapular area for over the past month, intermittent, dull, he has been taking Tylenol prn for pain.   Of note, pt was seen in a hospital at [facility] yesterday for evaluation of his weakness and was told that his hemoglobin was low at 8.7. He was recommended getting a  transfusion at that time which the pt and daughter both declined.  Pt and daughter flew back to [place] earlier today for further workup. Daughter states other lab work at the hospital was unremarkable, urine unremarkable,     ED COURSE:  Vitals: T 98.2, HR 86, /67, RR 16, SpO2 100% RA  Labs significant for: Hgb/Hct 8.9/26.8, BUN/Cr 64/5.70, K 3.8, Lipase 106  U/A: Moderate blood, Nitrite neg, Leuk Esterase Neg, occasional bacteria, squamous cells present  COVID neg  Imaging:  -CT head noncontrast: 1. No evidence of acute territorial infarction, hemorrhage or mass effect. 2. Findings most concordant with chronic microvascular ischemic change.   -CT Chest, Abd/Pelvis with IV contrast: Exophytic lesion arising from the gastric fundus with interval increase in size since CT 8/16/2021. This may represent a solid mass such as a GIST. Differential includes gastric diverticulum, which is considered less likely. Consider CT with oral contrast for further evaluation.   Age indeterminate compression fracture deformity involving the T8 vertebral body. Correlate for point tenderness.  EKG: Normal sinus rhythm rate 76, Normal ECG When compared with ECG of 07-JAN-2022 10:24, No significant change was found Confirmed by Chantal Metz ([id number]) on 9/17/2023 2:37:49 PM  Pt received:  1 L NS bolus x1, started on sodium chloride 0.45% at 75 cc/hr

## 2023-09-17 NOTE — H&P ADULT - PROBLEM SELECTOR PLAN 5
CT chest with IV contrast: Age indeterminate compression fracture deformity involving the T8 vertebral body. Correlate for point tenderness. Chronic problem  - continue home medications: amlodipine 10mg qD  - hold lisinopril-HCTZ 10-12.5mg i/s/o WIYL Chronic   - Stable  - Continue home amlodipine 10mg QD with hold parameters   - Will HOLD home lisinopril-HCTZ 10-12.5mg in the setting of WILY, may restart once kidney function improves   - Monitor routine hemodynamics

## 2023-09-17 NOTE — H&P ADULT - PROBLEM SELECTOR PLAN 6
Chronic problem  - continue home medications: Chronic problem  - continue home medications: tamsulosin 0.4mg qD Chronic   - Continue home tamsulosin 0.4mg QD

## 2023-09-17 NOTE — CONSULT NOTE ADULT - SUBJECTIVE AND OBJECTIVE BOX
Chief Complaint:  Patient is a 73y old  Male who presents with a chief complaint of anemia, gastric mass concerning for malignancy, casie   73-year-old male with PMH of hypertension and BPH presents with generalized fatigue, anorexia, and weight loss for the past month.  Daughter Silvia at bedside,  offered and declined, patient preferred Daughter to translate. Patient has been visiting the [place] since early July.  Daughter reports generalized lethargy, fatigue, generalized weakness, lightheadedness, loss of appetite, and weight loss for the past month. Unsure of exact amount of weight loss, about ~15 lb weight loss, but visible loss of "belly size". Denies abdominal pain, nausea, vomiting, diarrhea. Reports some constipation since yesterday and small marble-like stool. Denies melena, hematochezia, hematuria, hemoptysis. Last colonoscopy was 10yrs ago which was unremarkable as per pt, pt did have an endoscopy "many years ago" which was also unremarkable. Denies FH colon cancer or gastric cancer. Per daughter patient is usually able to complete ADLs independently and walk around without assistance, but recently over the past month has required to hold onto walls for support while walking. Denies fever, chills, body aches, cp, sob, palpitations, urinary sx, headache, dizziness, bowel or bladder incontinence.   Earlier in the summer, pt was having back pain, was seen seen in a hospital in the [facility], was told that he had a compression fracture at that time. Pt does endorse pain in R subscapular area for over the past month, intermittent, dull, he has been taking Tylenol prn for pain.   Of note, pt was seen in a hospital at [facility] yesterday for evaluation of his weakness and was told that his hemoglobin was low at 8.7. He was recommended getting a  transfusion at that time which the pt and daughter both declined.  Pt and daughter flew back to [place] earlier today for further workup. Daughter states other lab work at the hospital was unremarkable, urine unremarkable,     ED COURSE:  Vitals: T 98.2, HR 86, /67, RR 16, SpO2 100% RA  Labs significant for: Hgb/Hct 8.9/26.8, BUN/Cr 64/5.70, K 3.8, Lipase 106  U/A: Moderate blood, Nitrite neg, Leuk Esterase Neg, occasional bacteria, squamous cells present  COVID neg  Imaging:  -CT head noncontrast: 1. No evidence of acute territorial infarction, hemorrhage or mass effect. 2. Findings most concordant with chronic microvascular ischemic change.   -CT Chest, Abd/Pelvis with IV contrast: Exophytic lesion arising from the gastric fundus with interval increase in size since CT 8/16/2021. This may represent a solid mass such as a GIST. Differential includes gastric diverticulum, which is considered less likely. Consider CT with oral contrast for further evaluation.   Age indeterminate compression fracture deformity involving the T8 vertebral body. Correlate for point tenderness.  EKG: Normal sinus rhythm rate 76, Normal ECG When compared with ECG of 07-JAN-2022 10:24, No significant change was found Confirmed by Chantal Metz ([id number]) on 9/17/2023 2:37:49 PM  Pt received:  1 L NS bolus x1, started on sodium chloride 0.45% at 75 cc/hr       Review of Systems:  Review of Systems: REVIEW OF SYSTEMS:  CONSTITUTIONAL: +fatigue, No fever, chills, +weakness  HENMT: No HA, dizziness, rhinorrhea  RESPIRATORY: No cough or shortness of breath.  CARDIOVASCULAR: No chest pain, palpitations.  GASTROINTESTINAL: +constipation, No abdominal pain. No nausea or vomiting; No diarrhea, No melena or hematochezia   GENITOURINARY: No dysuria, changes in frequency, hematuria, or incontinence  NEUROLOGICAL: +decreased strength. Sensation intact bilaterally. +lightheadedness  SKIN: No itching, rashes  MUSCULOSKELETAL: No joint pain or swelling; +back pain    Allergies:  No Known Allergies      Medications:  acetaminophen     Tablet .. 650 milliGRAM(s) Oral every 6 hours PRN  amLODIPine   Tablet 10 milliGRAM(s) Oral daily  heparin   Injectable 5000 Unit(s) SubCutaneous every 12 hours  lidocaine   4% Patch 1 Patch Transdermal every 24 hours  polyethylene glycol 3350 17 Gram(s) Oral daily  sodium chloride 0.45%. 1000 milliLiter(s) IV Continuous <Continuous>  tamsulosin 0.4 milliGRAM(s) Oral at bedtime      PMHX/PSHX:  No pertinent past medical history    HTN (hypertension)    BPH (benign prostatic hyperplasia)    No significant past surgical history    History of appendectomy        Family history:  FH: CAD (coronary artery disease)    FH: diabetes mellitus        Social History:     ROS:     General:  No wt loss, fevers, chills, night sweats, fatigue,   Eyes:  Good vision, no reported pain  ENT:  No sore throat, pain, runny nose, dysphagia  CV:  No pain, palpitations, hypo/hypertension  Resp:  No dyspnea, cough, tachypnea, wheezing  GI:  No pain, No nausea, No vomiting, No diarrhea, No constipation, No weight loss, No fever, No pruritis, No rectal bleeding, No tarry stools, No dysphagia,  :  No pain, bleeding, incontinence, nocturia  Muscle:  No pain, weakness  Neuro:  No weakness, tingling, memory problems  Psych:  No fatigue, insomnia, mood problems, depression  Endocrine:  No polyuria, polydipsia, cold/heat intolerance  Heme:  No petechiae, ecchymosis, easy bruisability  Skin:  No rash, tattoos, scars, edema      PHYSICAL EXAM:   Vital Signs:  Vital Signs Last 24 Hrs  T(C): 37.1 (17 Sep 2023 20:22), Max: 37.1 (17 Sep 2023 20:22)  T(F): 98.8 (17 Sep 2023 20:22), Max: 98.8 (17 Sep 2023 20:22)  HR: 89 (17 Sep 2023 20:22) (71 - 89)  BP: 133/63 (17 Sep 2023 20:22) (114/69 - 133/63)  BP(mean): --  RR: 18 (17 Sep 2023 20:22) (16 - 18)  SpO2: 96% (17 Sep 2023 20:22) (96% - 100%)    Parameters below as of 17 Sep 2023 20:22  Patient On (Oxygen Delivery Method): room air      Daily Height in cm: 167.64 (17 Sep 2023 12:32)    Daily     GENERAL:  Appears stated age, well-groomed, well-nourished, no distress  HEENT:  NC/AT,  conjunctivae clear and pink, no thyromegaly, nodules, adenopathy, no JVD, sclera -anicteric  CHEST:  Full & symmetric excursion, no increased effort, breath sounds clear  HEART:  Regular rhythm, S1, S2, no murmur/rub/S3/S4, no abdominal bruit, no edema  ABDOMEN:  Soft, non-tender, non-distended, normoactive bowel sounds,  no masses ,no hepato-splenomegaly, no signs of chronic liver disease  EXTEREMITIES:  no cyanosis,clubbing or edema  SKIN:  No rash/erythema/ecchymoses/petechiae/wounds/abscess/warm/dry  NEURO:  Alert, oriented, no asterixis, no tremor, no encephalopathy    LABS:                        8.9    5.71  )-----------( 190      ( 17 Sep 2023 12:45 )             26.8     09-17    142  |  110<H>  |  59<H>  ----------------------------<  108<H>  3.9   |  25  |  5.00<H>    Ca    9.2      17 Sep 2023 19:30    TPro  7.6  /  Alb  4.0  /  TBili  0.4  /  DBili  x   /  AST  21  /  ALT  32  /  AlkPhos  63  09-17    LIVER FUNCTIONS - ( 17 Sep 2023 12:45 )  Alb: 4.0 g/dL / Pro: 7.6 g/dL / ALK PHOS: 63 U/L / ALT: 32 U/L / AST: 21 U/L / GGT: x           PT/INR - ( 17 Sep 2023 12:45 )   PT: 11.1 sec;   INR: 0.95 ratio         PTT - ( 17 Sep 2023 12:45 )  PTT:31.5 sec  Urinalysis Basic - ( 17 Sep 2023 19:30 )    Color: x / Appearance: x / SG: x / pH: x  Gluc: 108 mg/dL / Ketone: x  / Bili: x / Urobili: x   Blood: x / Protein: x / Nitrite: x   Leuk Esterase: x / RBC: x / WBC x   Sq Epi: x / Non Sq Epi: x / Bacteria: x      Amylase Serum--      Lipase [id number]       Ammonia--      Imaging:

## 2023-09-17 NOTE — ED PROVIDER NOTE - NS ED ATTENDING STATEMENT MOD
This was a shared visit with the [facility]. I reviewed and verified the documentation and independently performed the documented:

## 2023-09-17 NOTE — H&P ADULT - PROBLEM SELECTOR PLAN 2
- Hgb/Hct 8.9/26.8 - Hgb/Hct 8.9/26.8, baseline 14.5/41.7 in Jan 2022    - maintain active type and screen q72hr  - trend CBC daily  - f/u Heme/Onc recs Hgb/Hct 8.9/26.8 on admission, H/H 14.5/41.7 in Jan 2022, no recent blood work, unclear baseline   - anemia likely in the setting of  malignancy   - No s/s bleeding at this time   - F/u iron studies, folate, b12,  - F/u FOBT   - Blood consent in chart   - maintain active type and screen q72hr  - F/u CBC and transfuse if Hgb<7 or s/s bleeding   - Heme/onc Dr. Toth consulted, f/u recs  - Plan as above

## 2023-09-17 NOTE — ED ADULT NURSE NOTE - CHIEF COMPLAINT QUOTE
Patient is a 74yo male complaining of lethargy and anorexia Patient was in the [place] patient was seen at the hospital and was told that he is anemic HGB 8.0 and needs a blood transfusion Patient family did not want a transfusion from there. PT Cat scan Urine and stool all came out negative according to family

## 2023-09-17 NOTE — CONSULT NOTE ADULT - ASSESSMENT
gastric mass ? gist  weight loss  malnutrition    plan  upper gastrointestinal endoscopy in am  ppi once a day  nutrition consult  gerd precautions

## 2023-09-17 NOTE — H&P ADULT - NSHPPHYSICALEXAM_GEN_ALL_CORE
Vital Signs Last 24 Hrs  T(C): 36.5 (17 Sep 2023 16:14), Max: 36.8 (17 Sep 2023 12:32)  T(F): 97.7 (17 Sep 2023 16:14), Max: 98.2 (17 Sep 2023 12:32)  HR: 71 (17 Sep 2023 16:14) (71 - 86)  BP: 121/56 (17 Sep 2023 16:14) (118/67 - 121/56)  RR: 18 (17 Sep 2023 16:14) (16 - 18)  SpO2: 98% (17 Sep 2023 16:14) (98% - 100%)    O2 Parameters below as of 17 Sep 2023 16:14  Patient On (Oxygen Delivery Method): room air        CONSTITUTIONAL: awake, alert, no acute distress  HEENT: Atraumatic normocephalic. Moist mucous membranes.  No conjunctival injection.  RESP: No respiratory distress; CTA b/l, no WRR  CV: RRR, +S1S2, no MRG  GI: Bowel sounds present. Soft, nontender, nondistended, no rebound or guarding  MSK: Moving all four extremities spontaneously. No peripheral edema. No calf tenderness.  SKIN: Warm and dry. No rashes noted.  NEURO: AOx3, answering questions and following commands appropriately  PSYCH: Mood and affect appropriate, recent memory intact Vital Signs Last 24 Hrs  T(C): 36.5 (17 Sep 2023 16:14), Max: 36.8 (17 Sep 2023 12:32)  T(F): 97.7 (17 Sep 2023 16:14), Max: 98.2 (17 Sep 2023 12:32)  HR: 71 (17 Sep 2023 16:14) (71 - 86)  BP: 121/56 (17 Sep 2023 16:14) (118/67 - 121/56)  RR: 18 (17 Sep 2023 16:14) (16 - 18)  SpO2: 98% (17 Sep 2023 16:14) (98% - 100%)    O2 Parameters below as of 17 Sep 2023 16:14  Patient On (Oxygen Delivery Method): room air        CONSTITUTIONAL: awake, alert, no acute distress, pleasant male   HEENT: Atraumatic normocephalic. Moist mucous membranes.  No conjunctival injection.  RESP: No respiratory distress; CTA b/l, no WRR  CV: RRR, +S1S2, no MRG  GI: Bowel sounds present. Soft, nontender, nondistended, no rebound or guarding  MSK: Moving all four extremities spontaneously. No peripheral edema. No calf tenderness.  SKIN: Warm and dry. No rashes noted.  NEURO: AOx3, answering questions and following commands appropriately, 4/5 strength BL upper and lower extremity, sensation intact   PSYCH: Mood and affect appropriate, recent memory intact

## 2023-09-17 NOTE — H&P ADULT - PROBLEM SELECTOR PLAN 1
-CT Chest, Abd/Pelvis with IV contrast: Exophytic lesion arising from the gastric fundus with interval increase in size since CT 8/16/2021. This may represent a solid mass such as a GIST. Differential includes gastric diverticulum, which is considered less likely. Consider CT with oral contrast for further evaluation. -CT Chest, Abd/Pelvis with IV contrast: Exophytic lesion arising from the gastric fundus with interval increase in size since CT 8/16/2021. This may represent a solid mass such as a GIST. Differential includes gastric diverticulum, which is considered less likely. Consider CT with oral contrast for further evaluation.  - Elevated lipase to 106    - f/u GI recs  - f/u Heme/Onc recs Pt presents with fatigue, weakness, weight loss, decreased appetite x1 month   - CT Chest, Abd/Pelvis with IV contrast: Exophytic lesion arising from the gastric fundus measuring 4.1 x 5.3 x 5.2 cm, increased in size from CT 8/16/2021 where it measured 3.0 x 3.4 x 3.3 cm.. This may represent a solid mass such as a GIST. Differential includes gastric diverticulum, which is considered less likely.   - lipase: 106  - Nutrition consult   - Pt with new anemia, worsening kidney function, compression fx, f/u SPEP, UPEP to r/o multiple myeloma though low on differential   - GI Dr. Hua consulted, f/u recs, pt may need further evaluation with EGD/colonoscopy  - Heme/onc Dr. Toth consulted, f/u recs

## 2023-09-17 NOTE — PATIENT PROFILE ADULT - FALL HARM RISK - FACTORS
Patient presented today at the coagulation clinic and seen face to face. Patient status was reviewed, INR obtained orders received and patient verbalized understanding of coumadin instructions. Please refer to tracking for detailed patient status and dosage information.   Impaired gait/Weakness

## 2023-09-17 NOTE — ED PROVIDER NOTE - CLINICAL SUMMARY MEDICAL DECISION MAKING FREE TEXT BOX
Patient is a 73-year-old  male from the [place] who been having back pain since May of this year, told that were related to compression fractures though there was no trauma and over the past month has had increasing weakness fatigue poor appetite and weight loss.  Recently was seen and evaluated at one of their hospitals and per patient CAT scan of the head was normal but was told he was anemic with hemoglobin of close to 8.  No fever no chills no nausea vomiting diarrhea no cough no shortness of breath.  This case will require extensive evaluation labs imaging.

## 2023-09-17 NOTE — ED PROVIDER NOTE - NEUROLOGICAL, MLM
Alert and oriented, no focal deficits, no motor or sensory deficits. Symmetric eyebrow raise and smile.  Elevates tongue and shoulders without difficulty.  Negative pronator drift.  Good  strength bilaterally.  Ambulatory with steady gait

## 2023-09-17 NOTE — ED PROVIDER NOTE - ATTENDING APP SHARED VISIT CONTRIBUTION OF CARE
Examination reveals a well-developed well-nourished  male in no acute distress HEENT normocephalic atraumatic pupils equal round react to light accommodation extraocular movements are intact neck is supple no JVD no bruits.  Sclera were anicteric.  Conjunctiva was slightly pale bilaterally.  Lungs are clear heart regular rate and rhythm without any murmurs rubs or gallops abdomen is soft nontender positive bowel sounds extremities without clubbing cyanosis or edema neurologically alert oriented x4 without any focal neurologic deficits skin is warm and dry. I agree with plan and management outlined by PA.

## 2023-09-17 NOTE — ED ADULT NURSE NOTE - OBJECTIVE STATEMENT
Arrived this morning from [place], went to hospital there for lethargy and weight loss, found to have low HGB, came here to have further workup.

## 2023-09-17 NOTE — ED PROVIDER NOTE - CARE PLAN
1 Principal Discharge DX:	WILY (acute kidney injury)  Secondary Diagnosis:	Gastric mass  Secondary Diagnosis:	Anemia  Secondary Diagnosis:	Weakness

## 2023-09-17 NOTE — H&P ADULT - NSHPSOCIALHISTORY_GEN_ALL_CORE
Tobacco: Denies  EtOH: Denies   Recreational drug use: Denies  Lives with:   Ambulates: unassisted   ADLs: able to cook, clean, and shop independently Tobacco: smoked 1-2 pakcs for 15 years, stopped in 1994 (15-30pk year hx)  EtOH: Denies   Recreational drug use: Denies  Lives with: wife  Ambulates: unassisted   ADLs: able to cook, clean, and shop independently Tobacco: smoked 1-2 pack/day for 15 years, stopped in 1994 (15-30pk year hx)  EtOH: Denies   Recreational drug use: Denies  Lives with: wife  Ambulates: unassisted   ADLs: able to cook, clean, and shop independently

## 2023-09-17 NOTE — H&P ADULT - REASON FOR ADMISSION
anemia, gastric mass concerning for malignancy anemia, gastric mass concerning for malignancy, casie

## 2023-09-17 NOTE — H&P ADULT - PROBLEM SELECTOR PLAN 3
- BUN/Cr 64/5.70 - BUN/Cr 64/5.70, baseline 21/1.1 in Jan 2022. decreased PO intake for past month    - continue 1/2 NS as per nephro  - f/u nephro recs - BUN/Cr 64/5.70, baseline 21/1.1 in Jan 2022. decreased PO intake for past month    - continue 1/2 NS as per nephro  - hold home lisinopril  - avoid nephrotoxic medications  - f/u nephro recs - BUN/Cr 64/5.70 on admission, BUN/Cr 21/1.1 in Jan 2022, no recent blood work, unclear baseline   - likely prerenal given decrease PO intake over the last month, no signs of hydronephrosis on CT, no urinary sx   - Pt was given IV contrast in the ED for CT scan  - U/A: Moderate blood, Nitrite neg, Leuk Esterase Neg, occasional bacteria, squamous cells present, so s/s bleeding, f/u UCx   - s/p 1L NS bolus x1, Continue 1/2 NS @75 cc/hr  as per nephro recs   - F/u BMP @20:00   - Hold home lisinopril-HCTZ   - F/u urine studies   - Avoid nephrotoxic medications  - Monitor daily CMP   - Nephro. Dr. Sweet consulted by ED, f/u recs

## 2023-09-17 NOTE — ED PROVIDER NOTE - PROGRESS NOTE DETAILS
Patient stable.  CAT scan results pending.  Lab results discussed.  Has BUN 64, creatinine 5.70, GFR of 10.  CAT scan was performed with IV contrast before labs per their protocol due to no history of renal insufficiency.  Is able to urinate without difficulty, no evidence of retention Patient stable.  Resting comfortably.  Labs and CAT scan results discussed with patient and family.  Spoke with Dr. Sweet.  Labs reviewed.  Will consult.  Recommends half-normal saline at 75 an hour.  Spoke with Dr. Chun, kindly accepts patient for admission

## 2023-09-17 NOTE — ED ADULT TRIAGE NOTE - CHIEF COMPLAINT QUOTE
Patient is a 74yo male complaining of lethargy and anorexia Patient was in the [place] patient was seen at the hospital and was told that he is anemic HGB 8.0 and needs a blood transfusion Patient family did not want a transfusion from there. PT Cat scan Urine and stool all came out negative according to family

## 2023-09-17 NOTE — H&P ADULT - ATTENDING COMMENTS
74 yo male with hx of HTN of HTN and BPH, presents with fatigue, anorexia, and weight loss x 1 month, admitted for new gastric mass, anemia and WILY.     Agree with above plan, edited where appropriate.

## 2023-09-17 NOTE — ED PROVIDER NOTE - OBJECTIVE STATEMENT
73-year-old male with history of hypertension and BPH presents with generalized fatigue, anorexia, and weight loss for the past month.  Patient has been visiting the [place] since early July.  Daughter reports generalized lethargy, fatigue, generalized weakness, no appetite, and weight loss the past month.  Daughter reports about 15 to 20 pound weight loss the past month.  Patient denies any headache, chest pain, shortness of breath, abdominal pain, nausea, vomiting, diarrhea.  Denies any black or bloody stools.  Also reports some generalized lightheadedness the past month.  Denies any spinning of room sensation.  Will occasionally be unsteady on his feet due to lightheadedness.  Denies any one-sided weakness.  Denies any difficulty speaking, finding words, vision changes.  Patient does report some pain in his upper back since July.  Daughter reports was diagnosed with compression fracture.  Denies any specific injury or trauma.  Denies any increase in back pain.  Was seen in hospital at [facility] and yesterday.  Was told hemoglobin 8.7 and recommended transfusion at that time.  Daughter did not want patient transfused and [place] so brought him back to [place] last night.  Daughter states other lab work unremarkable, urine unremarkable,  CT head unremarkable.  PCP Neri Pruett ([place])

## 2023-09-17 NOTE — PATIENT PROFILE ADULT - FALL HARM RISK - HARM RISK INTERVENTIONS
Assistance with ambulation/Assistance OOB with selected safe patient handling equipment/Communicate Risk of Fall with Harm to all staff/Discuss with provider need for PT consult/Monitor gait and stability/Reinforce activity limits and safety measures with patient and family/Tailored Fall Risk Interventions/Visual Cue: Yellow wristband and red socks/Bed in lowest position, wheels locked, appropriate side rails in place/Call bell, personal items and telephone in reach/Instruct patient to call for assistance before getting out of bed or chair/Non-slip footwear when patient is out of bed/[place] to call system/Physically safe environment - no spills, clutter or unnecessary equipment/Purposeful Proactive Rounding/Room/bathroom lighting operational, light cord in reach

## 2023-09-18 LAB
ALBUMIN SERPL ELPH-MCNC: 3.3 G/DL — SIGNIFICANT CHANGE UP (ref 3.3–5)
ALP SERPL-CCNC: 58 U/L — SIGNIFICANT CHANGE UP (ref 40–120)
ALT FLD-CCNC: 29 U/L — SIGNIFICANT CHANGE UP (ref 12–78)
ANION GAP SERPL CALC-SCNC: 8 MMOL/L — SIGNIFICANT CHANGE UP (ref 5–17)
AST SERPL-CCNC: 15 U/L — SIGNIFICANT CHANGE UP (ref 15–37)
BASOPHILS # BLD AUTO: 0.01 K/UL — SIGNIFICANT CHANGE UP (ref 0–0.2)
BASOPHILS NFR BLD AUTO: 0.2 % — SIGNIFICANT CHANGE UP (ref 0–2)
BILIRUB SERPL-MCNC: 0.4 MG/DL — SIGNIFICANT CHANGE UP (ref 0.2–1.2)
BUN SERPL-MCNC: 49 MG/DL — HIGH (ref 7–23)
CALCIUM SERPL-MCNC: 9.4 MG/DL — SIGNIFICANT CHANGE UP (ref 8.5–10.1)
CALCIUM SERPL-MCNC: 9.6 MG/DL — SIGNIFICANT CHANGE UP (ref 8.4–10.5)
CHLORIDE SERPL-SCNC: 110 MMOL/L — HIGH (ref 96–108)
CO2 SERPL-SCNC: 26 MMOL/L — SIGNIFICANT CHANGE UP (ref 22–31)
CREAT ?TM UR-MCNC: 34 MG/DL — SIGNIFICANT CHANGE UP
CREAT ?TM UR-MCNC: 88 MG/DL — SIGNIFICANT CHANGE UP
CREAT SERPL-MCNC: 4.4 MG/DL — HIGH (ref 0.5–1.3)
EGFR: 14 ML/MIN/1.73M2 — LOW
EOSINOPHIL # BLD AUTO: 0.07 K/UL — SIGNIFICANT CHANGE UP (ref 0–0.5)
EOSINOPHIL NFR BLD AUTO: 1.5 % — SIGNIFICANT CHANGE UP (ref 0–6)
FERRITIN SERPL-MCNC: 686 NG/ML — HIGH (ref 30–400)
FOLATE SERPL-MCNC: 5.7 NG/ML — SIGNIFICANT CHANGE UP
GLUCOSE SERPL-MCNC: 82 MG/DL — SIGNIFICANT CHANGE UP (ref 70–99)
HCT VFR BLD CALC: 26.3 % — LOW (ref 39–50)
HCV AB S/CO SERPL IA: 0.03 S/CO — SIGNIFICANT CHANGE UP (ref 0–0.99)
HCV AB SERPL-IMP: SIGNIFICANT CHANGE UP
HGB BLD-MCNC: 8.6 G/DL — LOW (ref 13–17)
IMM GRANULOCYTES NFR BLD AUTO: 1 % — HIGH (ref 0–0.9)
IRON SATN MFR SERPL: 36 % — SIGNIFICANT CHANGE UP (ref 16–55)
IRON SATN MFR SERPL: 81 UG/DL — SIGNIFICANT CHANGE UP (ref 45–165)
LYMPHOCYTES # BLD AUTO: 2.09 K/UL — SIGNIFICANT CHANGE UP (ref 1–3.3)
LYMPHOCYTES # BLD AUTO: 43.7 % — SIGNIFICANT CHANGE UP (ref 13–44)
MAGNESIUM SERPL-MCNC: 1.8 MG/DL — SIGNIFICANT CHANGE UP (ref 1.6–2.6)
MCHC RBC-ENTMCNC: 29.5 PG — SIGNIFICANT CHANGE UP (ref 27–34)
MCHC RBC-ENTMCNC: 32.7 GM/DL — SIGNIFICANT CHANGE UP (ref 32–36)
MCV RBC AUTO: 90.1 FL — SIGNIFICANT CHANGE UP (ref 80–100)
MONOCYTES # BLD AUTO: 0.29 K/UL — SIGNIFICANT CHANGE UP (ref 0–0.9)
MONOCYTES NFR BLD AUTO: 6.1 % — SIGNIFICANT CHANGE UP (ref 2–14)
NEUTROPHILS # BLD AUTO: 2.27 K/UL — SIGNIFICANT CHANGE UP (ref 1.8–7.4)
NEUTROPHILS NFR BLD AUTO: 47.5 % — SIGNIFICANT CHANGE UP (ref 43–77)
NRBC # BLD: 0 /100 WBCS — SIGNIFICANT CHANGE UP (ref 0–0)
OSMOLALITY UR: 337 MOSM/KG — SIGNIFICANT CHANGE UP (ref 50–1200)
PHOSPHATE SERPL-MCNC: 4.9 MG/DL — HIGH (ref 2.5–4.5)
PLATELET # BLD AUTO: 170 K/UL — SIGNIFICANT CHANGE UP (ref 150–400)
POTASSIUM SERPL-MCNC: 3.9 MMOL/L — SIGNIFICANT CHANGE UP (ref 3.5–5.3)
POTASSIUM SERPL-SCNC: 3.9 MMOL/L — SIGNIFICANT CHANGE UP (ref 3.5–5.3)
POTASSIUM UR-SCNC: 27.4 MMOL/L — SIGNIFICANT CHANGE UP
PROT ?TM UR-MCNC: 199 MG/DL — HIGH (ref 0–12)
PROT ?TM UR-MCNC: 518 MG/DL — HIGH (ref 0–12)
PROT SERPL-MCNC: 6.4 G/DL — SIGNIFICANT CHANGE UP (ref 6–8.3)
PROT SERPL-MCNC: 6.4 G/DL — SIGNIFICANT CHANGE UP (ref 6–8.3)
PROT SERPL-MCNC: 6.6 G/DL — SIGNIFICANT CHANGE UP (ref 6–8.3)
PROT/CREAT UR-RTO: 5.8 RATIO — HIGH (ref 0–0.2)
PROT/CREAT UR-RTO: 5.9 RATIO — HIGH (ref 0–0.2)
PTH-INTACT FLD-MCNC: 27 PG/ML — SIGNIFICANT CHANGE UP (ref 15–65)
RBC # BLD: 2.92 M/UL — LOW (ref 4.2–5.8)
RBC # BLD: 2.92 M/UL — LOW (ref 4.2–5.8)
RBC # FLD: 12.6 % — SIGNIFICANT CHANGE UP (ref 10.3–14.5)
RETICS #: 28 K/UL — SIGNIFICANT CHANGE UP (ref 25–125)
RETICS/RBC NFR: 1 % — SIGNIFICANT CHANGE UP (ref 0.5–2.5)
SODIUM SERPL-SCNC: 144 MMOL/L — SIGNIFICANT CHANGE UP (ref 135–145)
SODIUM UR-SCNC: 66 MMOL/L — SIGNIFICANT CHANGE UP
TIBC SERPL-MCNC: 223 UG/DL — SIGNIFICANT CHANGE UP (ref 220–430)
TRANSFERRIN SERPL-MCNC: 154 MG/DL — LOW (ref 200–360)
UIBC SERPL-MCNC: 142 UG/DL — SIGNIFICANT CHANGE UP (ref 110–370)
UUN UR-MCNC: 371 MG/DL — SIGNIFICANT CHANGE UP
VIT B12 SERPL-MCNC: 315 PG/ML — SIGNIFICANT CHANGE UP (ref 232–1245)
WBC # BLD: 4.78 K/UL — SIGNIFICANT CHANGE UP (ref 3.8–10.5)
WBC # FLD AUTO: 4.78 K/UL — SIGNIFICANT CHANGE UP (ref 3.8–10.5)

## 2023-09-18 PROCEDURE — 99222 1ST HOSP IP/OBS MODERATE 55: CPT

## 2023-09-18 PROCEDURE — 99233 SBSQ HOSP IP/OBS HIGH 50: CPT

## 2023-09-18 DEVICE — ESOPHAGEAL BALLOON CATH CRE FIXED WIRE 10-11-12MM: Type: IMPLANTABLE DEVICE | Status: FUNCTIONAL

## 2023-09-18 DEVICE — ESOPHAGEAL BALLOON CATH CRE FIXED WIRE 8-9-10MM: Type: IMPLANTABLE DEVICE | Status: FUNCTIONAL

## 2023-09-18 DEVICE — ESOPHAGEAL BALLOON CATH CRE FIXED WIRE 6-7-8MM: Type: IMPLANTABLE DEVICE | Status: FUNCTIONAL

## 2023-09-18 DEVICE — ESOPHAGEAL BALLOON CATH CRE FIXED WIRE 15-16.5-18MM: Type: IMPLANTABLE DEVICE | Status: FUNCTIONAL

## 2023-09-18 DEVICE — HEMOSPRAY HEMOSTAT ENDO 7F: Type: IMPLANTABLE DEVICE | Status: FUNCTIONAL

## 2023-09-18 DEVICE — ESOPHAGEAL BALLOON CATH CRE FIXED WIRE 12-13.5-15MM: Type: IMPLANTABLE DEVICE | Status: FUNCTIONAL

## 2023-09-18 RX ADMIN — PANTOPRAZOLE SODIUM 40 MILLIGRAM(S): 20 TABLET, DELAYED RELEASE ORAL at 05:01

## 2023-09-18 RX ADMIN — AMLODIPINE BESYLATE 10 MILLIGRAM(S): 2.5 TABLET ORAL at 05:01

## 2023-09-18 RX ADMIN — LIDOCAINE 1 PATCH: 4 CREAM TOPICAL at 05:36

## 2023-09-18 RX ADMIN — TAMSULOSIN HYDROCHLORIDE 0.4 MILLIGRAM(S): 0.4 CAPSULE ORAL at 21:30

## 2023-09-18 RX ADMIN — LIDOCAINE 1 PATCH: 4 CREAM TOPICAL at 17:46

## 2023-09-18 RX ADMIN — HEPARIN SODIUM 5000 UNIT(S): 5000 INJECTION INTRAVENOUS; SUBCUTANEOUS at 17:49

## 2023-09-18 RX ADMIN — HEPARIN SODIUM 5000 UNIT(S): 5000 INJECTION INTRAVENOUS; SUBCUTANEOUS at 05:01

## 2023-09-18 RX ADMIN — LIDOCAINE 1 PATCH: 4 CREAM TOPICAL at 21:28

## 2023-09-18 RX ADMIN — SODIUM CHLORIDE 75 MILLILITER(S): 9 INJECTION, SOLUTION INTRAVENOUS at 05:00

## 2023-09-18 NOTE — CARE COORDINATION ASSESSMENT. - NSPASTMEDSURGHISTORY_GEN_ALL_CORE_FT
PAST MEDICAL & SURGICAL HISTORY:  BPH (benign prostatic hyperplasia)      HTN (hypertension)      History of appendectomy

## 2023-09-18 NOTE — DIETITIAN INITIAL EVALUATION ADULT - PROBLEM SELECTOR PLAN 2
Hgb/Hct 8.9/26.8 on admission, H/H 14.5/41.7 in Jan 2022, no recent blood work, unclear baseline   - anemia likely in the setting of  malignancy   - No s/s bleeding at this time   - F/u iron studies, folate, b12,  - F/u FOBT   - Blood consent in chart   - maintain active type and screen q72hr  - F/u CBC and transfuse if Hgb<7 or s/s bleeding   - Heme/onc Dr. Toth consulted, f/u recs  - Plan as above

## 2023-09-18 NOTE — DIETITIAN INITIAL EVALUATION ADULT - PROBLEM SELECTOR PLAN 3
- BUN/Cr 64/5.70 on admission, BUN/Cr 21/1.1 in Jan 2022, no recent blood work, unclear baseline   - likely prerenal given decrease PO intake over the last month, no signs of hydronephrosis on CT, no urinary sx   - Pt was given IV contrast in the ED for CT scan  - U/A: Moderate blood, Nitrite neg, Leuk Esterase Neg, occasional bacteria, squamous cells present, so s/s bleeding, f/u UCx   - s/p 1L NS bolus x1, Continue 1/2 NS @75 cc/hr  as per nephro recs   - F/u BMP @20:00   - Hold home lisinopril-HCTZ   - F/u urine studies   - Avoid nephrotoxic medications  - Monitor daily CMP   - Nephro. Dr. Sweet consulted by ED, f/u recs

## 2023-09-18 NOTE — DIETITIAN INITIAL EVALUATION ADULT - PROBLEM SELECTOR PLAN 7
CT A/P: Small hypoattenuating lesion at the gallbladder fossa, incompletely characterized. Hypoattenuating left renal lesions are stable compared with the prior CT 8/16/2021, likely benign cysts  - F/u out patient upon discharge

## 2023-09-18 NOTE — CONSULT NOTE ADULT - ASSESSMENT
Assessment/Plan:  This is a 74 y/o M with HTN and BPH presented with generalized fatigue, anorexia, and weight loss for the past month.  Was recently diagnosed with compression fracture and has not been able to do his ADL's.  Was recently back from  with a dx of anemia and advised to get a transfusion but patient and daughter refused.  He is back to [place] for further w/u.  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    Cardiac Optimization  - CT abdomen/pelvis showed a lesion arising from the gastric fundus suspicious of GIST  - GI following.  Planned for EGD today  - Patient has no known CAD, heart failure, cardiac arrhythmias, or valvular disease  -       Jessy Aguirre DNP, NP-C, AGACNP-C  Cardiology  Call TEAMS         Assessment/Plan:  This is a 74 y/o M with HTN and BPH presented with generalized fatigue, anorexia, and weight loss for the past month.  Was recently diagnosed with compression fracture and has not been able to do his ADL's.  Was recently back from  with a dx of anemia and advised to get a transfusion but patient and daughter refused.  He is back to [place] for further w/u.  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  Labs revealed WILY    Cardiac Optimization/HTN  - CT abdomen/pelvis showed a lesion arising from the gastric fundus suspicious of GIST  - GI following.  Planned for EGD today  - Patient has no known CAD, heart failure, cardiac arrhythmias, or valvular disease  - Had a normal Stress test in 2/2022 in our office and had a reportedly normal TTE in the [place] last year with his PCP  - He has no modifiable risk factors noted at this time.  He is considered optimized to undergo a low risk non-cardiac procedure from cardiac standpoint  - EKG showed NSR with Q waves in leads ll and lll, unchanged from previous.  No anginal complaints    - On home Lisinopril/HCTZ.  Would hold for now in the setting of WILY.  Follow Renal recs  - Okay to give IV hydration  - Per outpatient records, he takes Norvasc 10 mg daily.  Can continue with parameter    - Monitor electrolytes, replete to keep K>4 and Mag>2  - Will continue to follow    Jessy Aguirre DNP, NP-C, AGACNP-C  Cardiology  Call TEAMS

## 2023-09-18 NOTE — DIETITIAN NUTRITION RISK NOTIFICATION - TREATMENT: THE FOLLOWING DIET HAS BEEN RECOMMENDED
Diet, NPO after Midnight:      NPO Start Date: 17-Sep-2023,   NPO Start Time: 23:59 (09-17-23 @ 22:29) [Active]  Diet, DASH/TLC:   Sodium & Cholesterol Restricted (09-17-23 @ 18:00) [Active]

## 2023-09-18 NOTE — CONSULT NOTE ADULT - ASSESSMENT
WILY, ? Baseline CKD 3: Prerenal azotemia, ARB Rx  Nephrotic range proteinuria: DDx membranous nephropathy  Anemia  Hypertension  Gastric mass    Continue IV hydration. Avoid nephrotoxic meds as possible. Avoid ACEI, ARB, NSAIDs. GI work up. Check serologies. May need kidney biopsy.   Monitor h/h trend. Transfuse PRBC's PRN. Check SPEP. Monitor BP trend. Titrate BP meds as needed.   Will follow electrolytes and renal function trend. D/w patients family at bedside.     Further recommendations pending clinical course. Thank you for the courtesy of this referral.

## 2023-09-18 NOTE — DIETITIAN INITIAL EVALUATION ADULT - PROBLEM SELECTOR PLAN 5
Chronic   - Stable  - Continue home amlodipine 10mg QD with hold parameters   - Will HOLD home lisinopril-HCTZ 10-12.5mg in the setting of WILY, may restart once kidney function improves   - Monitor routine hemodynamics

## 2023-09-18 NOTE — DIETITIAN INITIAL EVALUATION ADULT - ADD RECOMMEND
pt refusing Ensure supplements  menu explained to pt's daughter.  honor pt's food preferences/tolerances when requested.

## 2023-09-18 NOTE — DIETITIAN INITIAL EVALUATION ADULT - PERTINENT MEDS FT
MEDICATIONS  (STANDING):  amLODIPine   Tablet 10 milliGRAM(s) Oral daily  heparin   Injectable 5000 Unit(s) SubCutaneous every 12 hours  lidocaine   4% Patch 1 Patch Transdermal every 24 hours  pantoprazole    Tablet 40 milliGRAM(s) Oral before breakfast  polyethylene glycol 3350 17 Gram(s) Oral daily  sodium chloride 0.45%. 1000 milliLiter(s) (75 mL/Hr) IV Continuous <Continuous>  tamsulosin 0.4 milliGRAM(s) Oral at bedtime    MEDICATIONS  (PRN):  acetaminophen     Tablet .. 650 milliGRAM(s) Oral every 6 hours PRN Mild Pain (1 - 3)

## 2023-09-18 NOTE — DIETITIAN INITIAL EVALUATION ADULT - NS FNS DIET ORDER
Diet, NPO after Midnight:      NPO Start Date: 17-Sep-2023,   NPO Start Time: 23:59 (09-17-23 @ 22:29)  Diet, DASH/TLC:   Sodium & Cholesterol Restricted (09-17-23 @ 18:00)

## 2023-09-18 NOTE — DIETITIAN INITIAL EVALUATION ADULT - PROBLEM SELECTOR PLAN 1
Pt presents with fatigue, weakness, weight loss, decreased appetite x1 month   - CT Chest, Abd/Pelvis with IV contrast: Exophytic lesion arising from the gastric fundus measuring 4.1 x 5.3 x 5.2 cm, increased in size from CT 8/16/2021 where it measured 3.0 x 3.4 x 3.3 cm.. This may represent a solid mass such as a GIST. Differential includes gastric diverticulum, which is considered less likely.   - lipase: 106  - Nutrition consult   - Pt with new anemia, worsening kidney function, compression fx, f/u SPEP, UPEP to r/o multiple myeloma though low on differential   - GI Dr. Hua consulted, f/u recs, pt may need further evaluation with EGD/colonoscopy  - Heme/onc Dr. Toth consulted, f/u recs

## 2023-09-18 NOTE — CONSULT NOTE ADULT - SUBJECTIVE AND OBJECTIVE BOX
[facility] - Peter Piña Pannella, Patel, Savella, Cohen Goodger  Office Number: [phone]    Initial Consult Note: This is a 72 y/o M with HTN and BPH presented with generalized fatigue, anorexia, and weight loss for the past month.  Was recently diagnosed with compression fracture and has not been able to do his ADL's.  Was recently back from  with a dx of anemia and advised to get a transfusion but patient and daughter refused.  He is back to [place] for further w/u.  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    CHIEF COMPLAINT: Patient is a 73y old  Male who presents with a chief complaint of anemia, gastric mass concerning for malignancy, casie (18 Sep 2023 10:49)    HPI:  73-year-old male with PMH of hypertension and BPH presents with generalized fatigue, anorexia, and weight loss for the past month.  Daughter Silvia at bedside,  offered and declined, patient preferred Daughter to translate. Patient has been visiting the [place] since early July.  Daughter reports generalized lethargy, fatigue, generalized weakness, lightheadedness, loss of appetite, and weight loss for the past month. Unsure of exact amount of weight loss, about ~15 lb weight loss, but visible loss of "belly size". Denies abdominal pain, nausea, vomiting, diarrhea. Reports some constipation since yesterday and small marble-like stool. Denies melena, hematochezia, hematuria, hemoptysis. Last colonoscopy was 10yrs ago which was unremarkable as per pt, pt did have an endoscopy "many years ago" which was also unremarkable. Denies FH colon cancer or gastric cancer. Per daughter patient is usually able to complete ADLs independently and walk around without assistance, but recently over the past month has required to hold onto walls for support while walking. Denies fever, chills, body aches, cp, sob, palpitations, urinary sx, headache, dizziness, bowel or bladder incontinence.   Earlier in the summer, pt was having back pain, was seen seen in a hospital in the [facility], was told that he had a compression fracture at that time. Pt does endorse pain in R subscapular area for over the past month, intermittent, dull, he has been taking Tylenol prn for pain.   Of note, pt was seen in a hospital at [facility] yesterday for evaluation of his weakness and was told that his hemoglobin was low at 8.7. He was recommended getting a  transfusion at that time which the pt and daughter both declined.  Pt and daughter flew back to [place] earlier today for further workup. Daughter states other lab work at the hospital was unremarkable, urine unremarkable,     ED COURSE:  Vitals: T 98.2, HR 86, /67, RR 16, SpO2 100% RA  Labs significant for: Hgb/Hct 8.9/26.8, BUN/Cr 64/5.70, K 3.8, Lipase 106  U/A: Moderate blood, Nitrite neg, Leuk Esterase Neg, occasional bacteria, squamous cells present  COVID neg  Imaging:  -CT head noncontrast: 1. No evidence of acute territorial infarction, hemorrhage or mass effect. 2. Findings most concordant with chronic microvascular ischemic change.   -CT Chest, Abd/Pelvis with IV contrast: Exophytic lesion arising from the gastric fundus with interval increase in size since CT 8/16/2021. This may represent a solid mass such as a GIST. Differential includes gastric diverticulum, which is considered less likely. Consider CT with oral contrast for further evaluation.   Age indeterminate compression fracture deformity involving the T8 vertebral body. Correlate for point tenderness.  EKG: Normal sinus rhythm rate 76, Normal ECG When compared with ECG of 07-JAN-2022 10:24, No significant change was found Confirmed by Chantal Metz ([id number]) on 9/17/2023 2:37:49 PM  Pt received:  1 L NS bolus x1, started on sodium chloride 0.45% at 75 cc/hr (17 Sep 2023 16:49)    PAST MEDICAL & SURGICAL HISTORY:  HTN (hypertension)  BPH (benign prostatic hyperplasia)  History of appendectomy    SOCIAL HISTORY:  No tobacco, ethanol, or drug abuse.  FAMILY HISTORY:  FH: CAD (coronary artery disease)    FH: diabetes mellitus    No family history of acute MI or sudden cardiac death.  MEDICATIONS  (STANDING):  amLODIPine   Tablet 10 milliGRAM(s) Oral daily  heparin   Injectable 5000 Unit(s) SubCutaneous every 12 hours  lidocaine   4% Patch 1 Patch Transdermal every 24 hours  pantoprazole    Tablet 40 milliGRAM(s) Oral before breakfast  polyethylene glycol 3350 17 Gram(s) Oral daily  sodium chloride 0.45%. 1000 milliLiter(s) (75 mL/Hr) IV Continuous <Continuous>  tamsulosin 0.4 milliGRAM(s) Oral at bedtime    MEDICATIONS  (PRN):  acetaminophen     Tablet .. 650 milliGRAM(s) Oral every 6 hours PRN Mild Pain (1 - 3)    Allergies    No Known Allergies    Intolerances    REVIEW OF SYSTEMS:  CONSTITUTIONAL: No weakness, fevers or chills  EYES/ENT: No visual changes;  No vertigo or throat pain   NECK: No pain or stiffness  RESPIRATORY: No cough, wheezing, hemoptysis; No shortness of breath  CARDIOVASCULAR: No chest pain or palpitations  GASTROINTESTINAL: No abdominal pain. No nausea, vomiting, or hematemesis; No diarrhea or constipation. No melena or hematochezia.  GENITOURINARY: No dysuria, frequency or hematuria  NEUROLOGICAL: No numbness or weakness  SKIN: No itching or rash  All other review of systems is negative unless indicated above  VITAL SIGNS:   Vital Signs Last 24 Hrs  T(C): 36.6 (18 Sep 2023 04:54), Max: 37.1 (17 Sep 2023 20:22)  T(F): 97.8 (18 Sep 2023 04:54), Max: 98.8 (17 Sep 2023 20:22)  HR: 78 (18 Sep 2023 04:54) (71 - 89)  BP: 132/73 (18 Sep 2023 04:54) (114/69 - 133/63)  BP(mean): --  RR: 18 (18 Sep 2023 04:54) (16 - 18)  SpO2: 98% (18 Sep 2023 04:54) (96% - 100%)    Parameters below as of 18 Sep 2023 04:54  Patient On (Oxygen Delivery Method): room air      I&O's Summary    17 Sep 2023 07:01  -  18 Sep 2023 07:00  --------------------------------------------------------  IN: 1140 mL / OUT: 0 mL / NET: 1140 mL    On Exam:  Constitutional: NAD, alert and oriented x 3  Lungs:  Non-labored, breath sounds are clear bilaterally, No wheezing, rales or rhonchi  Cardiovascular: RRR.  S1 and S2 positive.  No murmurs, rubs, gallops or clicks  Gastrointestinal: Bowel Sounds present, soft, nontender.   Lymph: No peripheral edema. No cervical lymphadenopathy.  Neurological: Alert, no focal deficits  Skin: No rashes or ulcers   Psych:  Mood & affect appropriate.    LABS: All Labs Reviewed:                        8.6    4.78  )-----------( 170      ( 18 Sep 2023 07:08 )             26.3                         8.9    5.71  )-----------( 190      ( 17 Sep 2023 12:45 )             26.8     18 Sep 2023 07:08    144    |  110    |  49     ----------------------------<  82     3.9     |  26     |  4.40   17 Sep 2023 19:30    142    |  110    |  59     ----------------------------<  108    3.9     |  25     |  5.00   17 Sep 2023 12:45    141    |  106    |  64     ----------------------------<  129    3.8     |  26     |  5.70     Ca    9.4        18 Sep 2023 07:08  Ca    9.2        17 Sep 2023 19:30  Ca    9.8        17 Sep 2023 12:45  Phos  4.9       18 Sep 2023 07:08  Mg     1.8       18 Sep 2023 07:08    TPro  6.6    /  Alb  3.3    /  TBili  0.4    /  DBili  x      /  AST  15     /  ALT  29     /  AlkPhos  58     18 Sep 2023 07:08  TPro  6.0    /  Alb  x      /  TBili  x      /  DBili  x      /  AST  x      /  ALT  x      /  AlkPhos  x      17 Sep 2023 19:30  TPro  7.6    /  Alb  4.0    /  TBili  0.4    /  DBili  x      /  AST  21     /  ALT  32     /  AlkPhos  63     17 Sep 2023 12:45    PT/INR - ( 17 Sep 2023 12:45 )   PT: 11.1 sec;   INR: 0.95 ratio    PTT - ( 17 Sep 2023 12:45 )  PTT:31.5 sec    RADIOLOGY:    ACC: [id number] EXAM:  CT CHEST IC   ORDERED BY: LOGAN JONES     ACC: [id number] EXAM:  CT ABDOMEN AND PELVIS IC   ORDERED BY: LOGAN JONES     PROCEDURE DATE:  09/17/2023      INTERPRETATION:  CLINICAL INFORMATION: Fatigue and anorexia for one   month. Weight loss. Anemia.    COMPARISON: CT 8/16/2021    CONTRAST/COMPLICATIONS:  IV Contrast: Omnipaque 350 (accession [id number]), IV contrast documented   in unlinked concurrent exam (accession [id number])  90 cc administered   10   cc discarded  Oral Contrast: NONE  Complications: None reported at time of study completion    PROCEDURE:  CT of the Chest, Abdomen and Pelvis was performed.  Sagittal and coronal reformats were performed.    FINDINGS:  CHEST:  LUNGS AND LARGE AIRWAYS: Patent central airways. No pulmonary nodules or   parenchymal consolidation.  PLEURA: No pleural effusion.  VESSELS: Within normal limits.  HEART: Heart size is normal. No pericardial effusion.  MEDIASTINUM AND TOMÁS: No lymphadenopathy.  CHEST WALL AND LOWERNECK: Within normal limits.    ABDOMEN AND PELVIS:  LIVER: Normal size and morphology. Small hypoattenuating lesion at the   gallbladder fossa, incompletely characterized.  BILE DUCTS: Normal caliber.  GALLBLADDER: Within normal limits.  SPLEEN: Within normal limits.  PANCREAS: Within normal limits.  ADRENALS: Within normal limits.  KIDNEYS/URETERS: Hypoattenuating left renal lesions are stable compared   with the prior CT 8/16/2021, likely benign cysts. No calculus or   hydronephrosis. Symmetric renal enhance    BLADDER: Within normal limits.  REPRODUCTIVE ORGANS: Prostate within normal limits.    BOWEL: No bowel obstruction. Appendix is not visualized. No evidence of   inflammation in the pericecal region. There is an exophytic lesion   arising from the gastric fundus measuring 4.1 x 5.3 x 5.2 cm (AP x TR x   CC), increased in size from CT 8/16/2021 where it measured 3.0 x 3.4 x   3.3 cm.  PERITONEUM: No ascites.  VESSELS: Within normal limits. Retroaortic left renal vein.  RETROPERITONEUM/LYMPH NODES: No lymphadenopathy.  ABDOMINAL WALL: Within normal limits.  BONES: Bones are osteopenic with degenerative changes throughout the   thoracolumbar spine. Age indeterminate compression fracture deformity of   T8 vertebral body.    IMPRESSION:  Exophytic lesion arising from the gastric fundus with interval increase   in size since CT 8/16/2021. This may represent a solid mass such as a   GIST. Differential includes gastric diverticulum, which is considered   less likely. Consider CT with oral contrast for further evaluation.    Age indeterminate compression fracture deformity involving the T8   vertebral body. Correlate for point tenderness.    --- End of Report ---  ANAHY COOMBS DO; Attending Radiologist  This document has been electronically signed. Sep 17 2023  3:27PM  EKG:    Ventricular Rate 76 BPM    Atrial Rate 76 BPM    P-R Interval 146 ms    QRS Duration 90 ms    Q-T Interval 366 ms    QTC Calculation(Bazett) 411 ms    P Axis 47 degrees    R Axis -19 degrees    T Axis 34 degrees    Diagnosis Line Normal sinus rhythm  Normal ECG  When compared with ECG of 07-JAN-2022 10:24,  No significant change was found  Confirmed by Chantal Metz ([id number]) on 9/17/2023 2:37:49 PM

## 2023-09-18 NOTE — PROGRESS NOTE ADULT - ASSESSMENT
74 yo male with hx of HTN of HTN and BPH, presents with fatigue, anorexia, and weight loss x 1 month, admitted for new gastric mass, anemia and WILY.

## 2023-09-18 NOTE — CONSULT NOTE ADULT - SUBJECTIVE AND OBJECTIVE BOX
Patient is a 73y old  Male who presents with a chief complaint of anemia, gastric mass concerning for malignancy, casie (18 Sep 2023 10:40)    HPI:  73-year-old male with PMH of hypertension and BPH presents with generalized fatigue, anorexia, and weight loss for the past month.  Daughter Silvia at bedside,  offered and declined, patient preferred Daughter to translate. Patient has been visiting the [place] since early July.  Daughter reports generalized lethargy, fatigue, generalized weakness, lightheadedness, loss of appetite, and weight loss for the past month. Unsure of exact amount of weight loss, about ~15 lb weight loss, but visible loss of "belly size". Denies abdominal pain, nausea, vomiting, diarrhea. Reports some constipation since yesterday and small marble-like stool. Denies melena, hematochezia, hematuria, hemoptysis. Last colonoscopy was 10yrs ago which was unremarkable as per pt, pt did have an endoscopy "many years ago" which was also unremarkable. Denies FH colon cancer or gastric cancer. Per daughter patient is usually able to complete ADLs independently and walk around without assistance, but recently over the past month has required to hold onto walls for support while walking. Denies fever, chills, body aches, cp, sob, palpitations, urinary sx, headache, dizziness, bowel or bladder incontinence.   Earlier in the summer, pt was having back pain, was seen seen in a hospital in the [facility], was told that he had a compression fracture at that time. Pt does endorse pain in R subscapular area for over the past month, intermittent, dull, he has been taking Tylenol prn for pain.   Of note, pt was seen in a hospital at [facility] yesterday for evaluation of his weakness and was told that his hemoglobin was low at 8.7. He was recommended getting a  transfusion at that time which the pt and daughter both declined.  Pt and daughter flew back to [place] earlier today for further workup. Daughter states other lab work at the hospital was unremarkable, urine unremarkable,     ED COURSE:  Vitals: T 98.2, HR 86, /67, RR 16, SpO2 100% RA  Labs significant for: Hgb/Hct 8.9/26.8, BUN/Cr 64/5.70, K 3.8, Lipase 106  U/A: Moderate blood, Nitrite neg, Leuk Esterase Neg, occasional bacteria, squamous cells present  COVID neg  Imaging:  -CT head noncontrast: 1. No evidence of acute territorial infarction, hemorrhage or mass effect. 2. Findings most concordant with chronic microvascular ischemic change.   -CT Chest, Abd/Pelvis with IV contrast: Exophytic lesion arising from the gastric fundus with interval increase in size since CT 8/16/2021. This may represent a solid mass such as a GIST. Differential includes gastric diverticulum, which is considered less likely. Consider CT with oral contrast for further evaluation.   Age indeterminate compression fracture deformity involving the T8 vertebral body. Correlate for point tenderness.  EKG: Normal sinus rhythm rate 76, Normal ECG When compared with ECG of 07-JAN-2022 10:24, No significant change was found Confirmed by Chantal Metz ([id number]) on 9/17/2023 2:37:49 PM  Pt received:  1 L NS bolus x1, started on sodium chloride 0.45% at 75 cc/hr (17 Sep 2023 16:49)      PAST MEDICAL HISTORY:  No pertinent past medical history    HTN (hypertension)    BPH (benign prostatic hyperplasia)        PAST SURGICAL HISTORY:  No significant past surgical history    History of appendectomy        FAMILY HISTORY:  FH: CAD (coronary artery disease)    FH: diabetes mellitus        SOCIAL HISTORY:    Allergies    No Known Allergies    Intolerances      Home Medications:  amLODIPine 10 mg oral tablet: 1 orally once a day (17 Sep 2023 17:37)  lisinopril-hydrochlorothiazide 10 mg-12.5 mg oral tablet: 1 orally once a day (17 Sep 2023 17:37)    MEDICATIONS  (STANDING):  amLODIPine   Tablet 10 milliGRAM(s) Oral daily  heparin   Injectable 5000 Unit(s) SubCutaneous every 12 hours  lidocaine   4% Patch 1 Patch Transdermal every 24 hours  pantoprazole    Tablet 40 milliGRAM(s) Oral before breakfast  polyethylene glycol 3350 17 Gram(s) Oral daily  sodium chloride 0.45%. 1000 milliLiter(s) (75 mL/Hr) IV Continuous <Continuous>  tamsulosin 0.4 milliGRAM(s) Oral at bedtime    MEDICATIONS  (PRN):  acetaminophen     Tablet .. 650 milliGRAM(s) Oral every 6 hours PRN Mild Pain (1 - 3)      REVIEW OF SYSTEMS:  General:   Respiratory: No cough, SOB  Cardiovascular: No CP or Palpitations	  Gastrointestinal: No nausea, Vomiting. No diarrhea  Genitourinary: No urinary complaints	  Musculoskeletal: No leg swelling, No new rash or lesions	  Neurological: 	  all other systems negative    T(F): 97.8 (09-18-23 @ 04:54), Max: 98.8 (09-17-23 @ 20:22)  HR: 78 (09-18-23 @ 04:54) (71 - 89)  BP: 132/73 (09-18-23 @ 04:54) (114/69 - 133/63)  RR: 18 (09-18-23 @ 04:54) (16 - 18)  SpO2: 98% (09-18-23 @ 04:54) (96% - 100%)  Wt(kg): --    PHYSICAL EXAM:  General: NAD  Respiratory: b/l air entry  Cardiovascular: S1 S2  Gastrointestinal: soft  Extremities: edema        09-18    144  |  110<H>  |  49<H>  ----------------------------<  82  3.9   |  26  |  4.40<H>    Ca    9.4      18 Sep 2023 07:08  Phos  4.9     09-18  Mg     1.8     09-18    TPro  6.6  /  Alb  3.3  /  TBili  0.4  /  DBili  x   /  AST  15  /  ALT  29  /  AlkPhos  58  09-18                          8.6    4.78  )-----------( 170      ( 18 Sep 2023 07:08 )             26.3       Hemoglobin: 8.6 g/dL (09-18 @ 07:08)  Hematocrit: 26.3 % (09-18 @ 07:08)  Potassium: 3.9 mmol/L (09-18 @ 07:08)  Blood Urea Nitrogen: 49 mg/dL (09-18 @ 07:08)      Creatinine, Serum: 4.40 (09-18 @ 07:08)  Creatinine, Serum: 5.00 (09-17 @ 19:30)  Creatinine, Serum: 5.70 (09-17 @ 12:45)      Urinalysis Basic - ( 18 Sep 2023 07:08 )    Color: x / Appearance: x / SG: x / pH: x  Gluc: 82 mg/dL / Ketone: x  / Bili: x / Urobili: x   Blood: x / Protein: x / Nitrite: x   Leuk Esterase: x / RBC: x / WBC x   Sq Epi: x / Non Sq Epi: x / Bacteria: x      LIVER FUNCTIONS - ( 18 Sep 2023 07:08 )  Alb: 3.3 g/dL / Pro: 6.6 g/dL / ALK PHOS: 58 U/L / ALT: 29 U/L / AST: 15 U/L / GGT: x                       I&O's Detail    17 Sep 2023 07:01  -  18 Sep 2023 07:00  --------------------------------------------------------  IN:    Oral Fluid: 240 mL    sodium chloride 0.45%: 900 mL  Total IN: 1140 mL    OUT:  Total OUT: 0 mL    Total NET: 1140 mL               Patient is a 73y old  Male who presents with a chief complaint of anemia, gastric mass concerning for malignancy, wily (18 Sep 2023 10:40)    HPI:  73-year-old male with PMH of hypertension and BPH presents with generalized fatigue, anorexia, and weight loss for the past month.  Daughter Silvia at bedside,  offered and declined, patient preferred Daughter to translate. Patient has been visiting the [place] since early July.  Daughter reports generalized lethargy, fatigue, generalized weakness, lightheadedness, loss of appetite, and weight loss for the past month. Unsure of exact amount of weight loss, about ~15 lb weight loss, but visible loss of "belly size". Denies abdominal pain, nausea, vomiting, diarrhea. Reports some constipation since yesterday and small marble-like stool. Denies melena, hematochezia, hematuria, hemoptysis. Last colonoscopy was 10yrs ago which was unremarkable as per pt, pt did have an endoscopy "many years ago" which was also unremarkable. Denies FH colon cancer or gastric cancer. Per daughter patient is usually able to complete ADLs independently and walk around without assistance, but recently over the past month has required to hold onto walls for support while walking. Denies fever, chills, body aches, cp, sob, palpitations, urinary sx, headache, dizziness, bowel or bladder incontinence.   Earlier in the summer, pt was having back pain, was seen seen in a hospital in the [facility], was told that he had a compression fracture at that time. Pt does endorse pain in R subscapular area for over the past month, intermittent, dull, he has been taking Tylenol prn for pain.   Of note, pt was seen in a hospital at [facility] yesterday for evaluation of his weakness and was told that his hemoglobin was low at 8.7. He was recommended getting a  transfusion at that time which the pt and daughter both declined.  Pt and daughter flew back to [place] earlier today for further workup. Daughter states other lab work at the hospital was unremarkable, urine unremarkable,     ED COURSE:  Vitals: T 98.2, HR 86, /67, RR 16, SpO2 100% RA  Labs significant for: Hgb/Hct 8.9/26.8, BUN/Cr 64/5.70, K 3.8, Lipase 106  U/A: Moderate blood, Nitrite neg, Leuk Esterase Neg, occasional bacteria, squamous cells present  COVID neg  Imaging:  -CT head noncontrast: 1. No evidence of acute territorial infarction, hemorrhage or mass effect. 2. Findings most concordant with chronic microvascular ischemic change.   -CT Chest, Abd/Pelvis with IV contrast: Exophytic lesion arising from the gastric fundus with interval increase in size since CT 8/16/2021. This may represent a solid mass such as a GIST. Differential includes gastric diverticulum, which is considered less likely. Consider CT with oral contrast for further evaluation.   Age indeterminate compression fracture deformity involving the T8 vertebral body. Correlate for point tenderness.  EKG: Normal sinus rhythm rate 76, Normal ECG When compared with ECG of 07-JAN-2022 10:24, No significant change was found Confirmed by Chantal Metz ([id number]) on 9/17/2023 2:37:49 PM  Pt received:  1 L NS bolus x1, started on sodium chloride 0.45% at 75 cc/hr (17 Sep 2023 16:49)    Renal consult called for WILY. History obtained from chart and patient's family at bedside.       PAST MEDICAL HISTORY:  No pertinent past medical history    HTN (hypertension)    BPH (benign prostatic hyperplasia)        PAST SURGICAL HISTORY:  No significant past surgical history    History of appendectomy        FAMILY HISTORY:  FH: CAD (coronary artery disease)    FH: diabetes mellitus        SOCIAL HISTORY: No smoking or alcohol use     Allergies    No Known Allergies    Intolerances      Home Medications:  amLODIPine 10 mg oral tablet: 1 orally once a day (17 Sep 2023 17:37)  lisinopril-hydrochlorothiazide 10 mg-12.5 mg oral tablet: 1 orally once a day (17 Sep 2023 17:37)    MEDICATIONS  (STANDING):  amLODIPine   Tablet 10 milliGRAM(s) Oral daily  heparin   Injectable 5000 Unit(s) SubCutaneous every 12 hours  lidocaine   4% Patch 1 Patch Transdermal every 24 hours  pantoprazole    Tablet 40 milliGRAM(s) Oral before breakfast  polyethylene glycol 3350 17 Gram(s) Oral daily  sodium chloride 0.45%. 1000 milliLiter(s) (75 mL/Hr) IV Continuous <Continuous>  tamsulosin 0.4 milliGRAM(s) Oral at bedtime    MEDICATIONS  (PRN):  acetaminophen     Tablet .. 650 milliGRAM(s) Oral every 6 hours PRN Mild Pain (1 - 3)      REVIEW OF SYSTEMS:  General: weak   Respiratory: No cough, SOB  Cardiovascular: No CP or Palpitations	  Gastrointestinal: No nausea, Vomiting. No diarrhea  Genitourinary: No urinary complaints	  Musculoskeletal: No new rash or lesions		  all other systems negative    T(F): 97.8 (09-18-23 @ 04:54), Max: 98.8 (09-17-23 @ 20:22)  HR: 78 (09-18-23 @ 04:54) (71 - 89)  BP: 132/73 (09-18-23 @ 04:54) (114/69 - 133/63)  RR: 18 (09-18-23 @ 04:54) (16 - 18)  SpO2: 98% (09-18-23 @ 04:54) (96% - 100%)  Wt(kg): --    PHYSICAL EXAM:  General: NAD  Respiratory: b/l air entry  Cardiovascular: S1 S2  Gastrointestinal: soft  Extremities: no edema        09-18    144  |  110<H>  |  49<H>  ----------------------------<  82  3.9   |  26  |  4.40<H>    Ca    9.4      18 Sep 2023 07:08  Phos  4.9     09-18  Mg     1.8     09-18    TPro  6.6  /  Alb  3.3  /  TBili  0.4  /  DBili  x   /  AST  15  /  ALT  29  /  AlkPhos  58  09-18                          8.6    4.78  )-----------( 170      ( 18 Sep 2023 07:08 )             26.3       Hemoglobin: 8.6 g/dL (09-18 @ 07:08)  Hematocrit: 26.3 % (09-18 @ 07:08)  Potassium: 3.9 mmol/L (09-18 @ 07:08)  Blood Urea Nitrogen: 49 mg/dL (09-18 @ 07:08)      Creatinine, Serum: 4.40 (09-18 @ 07:08)  Creatinine, Serum: 5.00 (09-17 @ 19:30)  Creatinine, Serum: 5.70 (09-17 @ 12:45)      Urinalysis Basic - ( 18 Sep 2023 07:08 )    Color: x / Appearance: x / SG: x / pH: x  Gluc: 82 mg/dL / Ketone: x  / Bili: x / Urobili: x   Blood: x / Protein: x / Nitrite: x   Leuk Esterase: x / RBC: x / WBC x   Sq Epi: x / Non Sq Epi: x / Bacteria: x      LIVER FUNCTIONS - ( 18 Sep 2023 07:08 )  Alb: 3.3 g/dL / Pro: 6.6 g/dL / ALK PHOS: 58 U/L / ALT: 29 U/L / AST: 15 U/L / GGT: x               I&O's Detail    17 Sep 2023 07:01  -  18 Sep 2023 07:00  --------------------------------------------------------  IN:    Oral Fluid: 240 mL    sodium chloride 0.45%: 900 mL  Total IN: 1140 mL    OUT:  Total OUT: 0 mL    Total NET: 1140 mL      < from: CT Abdomen and Pelvis w/ IV Cont (09.17.23 @ 14:38) >    ACC: [id number] EXAM:  CT CHEST IC   ORDERED BY: LOGAN JONES     ACC: [id number] EXAM:  CT ABDOMEN AND PELVIS IC   ORDERED BY: LOGAN JONES     PROCEDURE DATE:  09/17/2023          INTERPRETATION:  CLINICAL INFORMATION: Fatigue and anorexia for one   month. Weight loss. Anemia.    COMPARISON: CT 8/16/2021    CONTRAST/COMPLICATIONS:  IV Contrast: Omnipaque 350 (accession [id number]), IV contrast documented   in unlinked concurrent exam (accession [id number])  90 cc administered   10   cc discarded  Oral Contrast: NONE  Complications: None reported at time of study completion    PROCEDURE:  CT of the Chest, Abdomen and Pelvis was performed.  Sagittal and coronal reformats were performed.    FINDINGS:  CHEST:  LUNGS AND LARGE AIRWAYS: Patent central airways. No pulmonary nodules or   parenchymal consolidation.  PLEURA: No pleural effusion.  VESSELS: Within normal limits.  HEART: Heart size is normal. No pericardial effusion.  MEDIASTINUM AND TOMÁS: No lymphadenopathy.  CHEST WALL AND LOWERNECK: Within normal limits.    ABDOMEN AND PELVIS:  LIVER: Normal size and morphology. Small hypoattenuating lesion at the   gallbladder fossa, incompletely characterized.  BILE DUCTS: Normal caliber.  GALLBLADDER: Within normal limits.  SPLEEN: Within normal limits.  PANCREAS: Within normal limits.  ADRENALS: Within normal limits.  KIDNEYS/URETERS: Hypoattenuating left renal lesions are stable compared   with the prior CT 8/16/2021, likely benign cysts. No calculus or   hydronephrosis. Symmetricrenal enhance    BLADDER: Within normal limits.  REPRODUCTIVE ORGANS: Prostate within normal limits.    BOWEL: No bowel obstruction. Appendix is not visualized. No evidence of   inflammation in the pericecal region. There is an exophytic lesion   arising from the gastric fundus measuring 4.1 x 5.3 x 5.2 cm (AP x TR x   CC), increased in size from CT 8/16/2021 where it measured 3.0 x 3.4 x   3.3 cm.  PERITONEUM: No ascites.  VESSELS: Within normal limits. Retroaortic left renal vein.  RETROPERITONEUM/LYMPH NODES: No lymphadenopathy.  ABDOMINAL WALL: Within normal limits.  BONES: Bones are osteopenic with degenerative changes throughout the   thoracolumbar spine. Age indeterminate compression fracture deformity of   T8 vertebral body.    IMPRESSION:  Exophytic lesion arising from the gastric fundus with interval increase   in size since CT 8/16/2021. This may represent a solid mass such as a   GIST. Differential includes gastric diverticulum, which is considered   less likely. Consider CT with oral contrast for further evaluation.    Age indeterminate compression fracture deformity involving the T8   vertebral body. Correlate for point tenderness.        --- End of Report ---             ANAHY COOMBS DO; Attending Radiologist  This document has been electronically signed. Sep 17 2023  3:27PM    < end of copied text >

## 2023-09-18 NOTE — DIETITIAN INITIAL EVALUATION ADULT - PROBLEM SELECTOR PLAN 4
CT chest with IV contrast: Age indeterminate compression fracture deformity involving the T8 vertebral body.   - Pt with no point tenderness on exam  - Tylenol PRN for pain  - Lidocaine patch   - PT consult

## 2023-09-18 NOTE — CARE COORDINATION ASSESSMENT. - OTHER PERTINENT DISCHARGE PLANNING INFORMATION:
SW consult for depression received. Pt admitted with anemia and gastric mass with possible malignancy. QUIN met with the pt, his wife and dtr Silvia, who provided translation. Pt arrived from the [place] yesterday morning, where he started to become lethargic and not eating. He was diagnosed with anemia there. Dtr stated that he is a resident of the  and lives [place] of the [place] Pt was indep prior to this admission and was driving. He indicated feeling appropriately depressed after burying his 3 siblings this past yr. He is currently living with his dtr and her family in [place]. His wife lives in the [place] and works in [facility]. His PCP is Dr Tramaine Barnes in [facility]  and his pharmacy is [facility] in [place]. Emotional support provided to the pt and his dtr.

## 2023-09-18 NOTE — DIETITIAN INITIAL EVALUATION ADULT - PERTINENT LABORATORY DATA
09-18    144  |  110<H>  |  49<H>  ----------------------------<  82  3.9   |  26  |  4.40<H>    Ca    9.4      18 Sep 2023 07:08  Phos  4.9     09-18  Mg     1.8     09-18    TPro  6.6  /  Alb  3.3  /  TBili  0.4  /  DBili  x   /  AST  15  /  ALT  29  /  AlkPhos  58  09-18

## 2023-09-18 NOTE — CONSULT NOTE ADULT - NS ATTEND AMEND GEN_ALL_CORE FT
hx of htn  normal baseline ex cap  planned for egd in setting of anemia and lesion of gastric fundus on ct  optimized for planned egd

## 2023-09-18 NOTE — DIETITIAN INITIAL EVALUATION ADULT - OTHER INFO
74 y/o male adm with ARF, gastric mass, anemia, weakness. PMH BPH, HTN. Pt visited this morning at bedside. Pt sitting up in chair. Pt speaks Romansh. Requests daughter, who is present, to translate. Pt currently NPO awaiting endoscopy today. Prior to NPO, pt with poor appetite. Wt loss of ~ 15 pounds over past month. Pt declines Ensure. Offers no food preferences at this time. Explained DASH/TLC diet and pt will be able to make changes to his meals. Will have health shakes available if pt wishes. Pt declines at this time.

## 2023-09-18 NOTE — CARE COORDINATION ASSESSMENT. - NSCAREPROVIDERS_GEN_ALL_CORE_FT
CARE PROVIDERS:  Accepting Physician: Juan Manuel Chun  Administration: Gopi Hsu  Administration: Sagar Suh  Admitting: Thanh Leonard  Attending: Carlos Garrett  Case Management: Drew Hernandez  Consultant: Louie Hua  Consultant: Den Sweet  Covering Team: Andrew Escobar  ED ACP: Noelle Cano  ED Attending: Nash Padgett ED Nurse: Milton Loredo  ED Nurse 2: Carole Sandy  Nurse: Laura Holguin  Nurse: Miracle Mcleod  Ordered: Physician, Ordering  Ordered: ADM, User  Ordered: Doctor, Unknown  Outpatient Provider: Meño Mckeon  Outpatient Provider: Den Sweet  Override: Laura Holguin  PCA/Nursing Assistant: Smiley Franklin  PCA/Nursing Assistant: Smiley Regan  Primary Team: Thanh Leonard  Primary Team: Purnima Quispe  Primary Team: Mohan Dominguez  Primary Team: Kayli Orr  Primary Team: Bebo Perez  Registered Dietitian: Filomena Krueger  Respiratory Therapy: Moise White  : Rachael Epstein  : Renetta Mccullough  Team: LAURA LEVI Gastroenterology, Team

## 2023-09-19 ENCOUNTER — TRANSCRIPTION ENCOUNTER (OUTPATIENT)
Age: 73
End: 2023-09-19

## 2023-09-19 LAB
ALBUMIN SERPL ELPH-MCNC: 3.1 G/DL — LOW (ref 3.3–5)
ALP SERPL-CCNC: 53 U/L — SIGNIFICANT CHANGE UP (ref 40–120)
ALT FLD-CCNC: 25 U/L — SIGNIFICANT CHANGE UP (ref 12–78)
ANION GAP SERPL CALC-SCNC: 7 MMOL/L — SIGNIFICANT CHANGE UP (ref 5–17)
AST SERPL-CCNC: 16 U/L — SIGNIFICANT CHANGE UP (ref 15–37)
BASOPHILS # BLD AUTO: 0.02 K/UL — SIGNIFICANT CHANGE UP (ref 0–0.2)
BASOPHILS NFR BLD AUTO: 0.4 % — SIGNIFICANT CHANGE UP (ref 0–2)
BILIRUB SERPL-MCNC: 0.3 MG/DL — SIGNIFICANT CHANGE UP (ref 0.2–1.2)
BUN SERPL-MCNC: 51 MG/DL — HIGH (ref 7–23)
CALCIUM SERPL-MCNC: 9.6 MG/DL — SIGNIFICANT CHANGE UP (ref 8.5–10.1)
CHLORIDE SERPL-SCNC: 112 MMOL/L — HIGH (ref 96–108)
CO2 SERPL-SCNC: 26 MMOL/L — SIGNIFICANT CHANGE UP (ref 22–31)
CREAT SERPL-MCNC: 4.7 MG/DL — HIGH (ref 0.5–1.3)
CREATININE, URINE RESULT: 53 MG/DL — SIGNIFICANT CHANGE UP
CULTURE RESULTS: SIGNIFICANT CHANGE UP
EGFR: 12 ML/MIN/1.73M2 — LOW
EOSINOPHIL # BLD AUTO: 0.08 K/UL — SIGNIFICANT CHANGE UP (ref 0–0.5)
EOSINOPHIL NFR BLD AUTO: 1.7 % — SIGNIFICANT CHANGE UP (ref 0–6)
GLUCOSE SERPL-MCNC: 98 MG/DL — SIGNIFICANT CHANGE UP (ref 70–99)
HCT VFR BLD CALC: 25 % — LOW (ref 39–50)
HGB BLD-MCNC: 8.2 G/DL — LOW (ref 13–17)
IMM GRANULOCYTES NFR BLD AUTO: 1.1 % — HIGH (ref 0–0.9)
LYMPHOCYTES # BLD AUTO: 2.06 K/UL — SIGNIFICANT CHANGE UP (ref 1–3.3)
LYMPHOCYTES # BLD AUTO: 44.4 % — HIGH (ref 13–44)
MAGNESIUM SERPL-MCNC: 1.7 MG/DL — SIGNIFICANT CHANGE UP (ref 1.6–2.6)
MCHC RBC-ENTMCNC: 29.2 PG — SIGNIFICANT CHANGE UP (ref 27–34)
MCHC RBC-ENTMCNC: 32.8 GM/DL — SIGNIFICANT CHANGE UP (ref 32–36)
MCV RBC AUTO: 89 FL — SIGNIFICANT CHANGE UP (ref 80–100)
MONOCYTES # BLD AUTO: 0.31 K/UL — SIGNIFICANT CHANGE UP (ref 0–0.9)
MONOCYTES NFR BLD AUTO: 6.7 % — SIGNIFICANT CHANGE UP (ref 2–14)
NEUTROPHILS # BLD AUTO: 2.12 K/UL — SIGNIFICANT CHANGE UP (ref 1.8–7.4)
NEUTROPHILS NFR BLD AUTO: 45.7 % — SIGNIFICANT CHANGE UP (ref 43–77)
NRBC # BLD: 0 /100 WBCS — SIGNIFICANT CHANGE UP (ref 0–0)
PHOSPHATE SERPL-MCNC: 4.7 MG/DL — HIGH (ref 2.5–4.5)
PLATELET # BLD AUTO: 171 K/UL — SIGNIFICANT CHANGE UP (ref 150–400)
POTASSIUM SERPL-MCNC: 4.5 MMOL/L — SIGNIFICANT CHANGE UP (ref 3.5–5.3)
POTASSIUM SERPL-SCNC: 4.5 MMOL/L — SIGNIFICANT CHANGE UP (ref 3.5–5.3)
PROT ?TM UR-MCNC: 321 MG/DL — HIGH (ref 0–12)
PROT ?TM UR-MCNC: 321 MG/DL — HIGH (ref 0–12)
PROT SERPL-MCNC: 6.1 G/DL — SIGNIFICANT CHANGE UP (ref 6–8.3)
RBC # BLD: 2.81 M/UL — LOW (ref 4.2–5.8)
RBC # FLD: 12.6 % — SIGNIFICANT CHANGE UP (ref 10.3–14.5)
RHEUMATOID FACT SERPL-ACNC: <10 IU/ML — SIGNIFICANT CHANGE UP (ref 0–13)
SODIUM SERPL-SCNC: 145 MMOL/L — SIGNIFICANT CHANGE UP (ref 135–145)
SPECIMEN SOURCE: SIGNIFICANT CHANGE UP
WBC # BLD: 4.64 K/UL — SIGNIFICANT CHANGE UP (ref 3.8–10.5)
WBC # FLD AUTO: 4.64 K/UL — SIGNIFICANT CHANGE UP (ref 3.8–10.5)

## 2023-09-19 PROCEDURE — 99232 SBSQ HOSP IP/OBS MODERATE 35: CPT

## 2023-09-19 RX ORDER — PREGABALIN 225 MG/1
1000 CAPSULE ORAL ONCE
Refills: 0 | Status: COMPLETED | OUTPATIENT
Start: 2023-09-19 | End: 2023-09-19

## 2023-09-19 RX ORDER — PANTOPRAZOLE SODIUM 20 MG/1
40 TABLET, DELAYED RELEASE ORAL EVERY 12 HOURS
Refills: 0 | Status: DISCONTINUED | OUTPATIENT
Start: 2023-09-19 | End: 2023-09-26

## 2023-09-19 RX ORDER — ALLOPURINOL 300 MG
100 TABLET ORAL DAILY
Refills: 0 | Status: DISCONTINUED | OUTPATIENT
Start: 2023-09-19 | End: 2023-09-26

## 2023-09-19 RX ADMIN — LIDOCAINE 1 PATCH: 4 CREAM TOPICAL at 17:08

## 2023-09-19 RX ADMIN — SODIUM CHLORIDE 75 MILLILITER(S): 9 INJECTION, SOLUTION INTRAVENOUS at 11:24

## 2023-09-19 RX ADMIN — HEPARIN SODIUM 5000 UNIT(S): 5000 INJECTION INTRAVENOUS; SUBCUTANEOUS at 17:08

## 2023-09-19 RX ADMIN — LIDOCAINE 1 PATCH: 4 CREAM TOPICAL at 04:58

## 2023-09-19 RX ADMIN — PREGABALIN 1000 MICROGRAM(S): 225 CAPSULE ORAL at 11:51

## 2023-09-19 RX ADMIN — PANTOPRAZOLE SODIUM 40 MILLIGRAM(S): 20 TABLET, DELAYED RELEASE ORAL at 17:08

## 2023-09-19 RX ADMIN — TAMSULOSIN HYDROCHLORIDE 0.4 MILLIGRAM(S): 0.4 CAPSULE ORAL at 21:35

## 2023-09-19 RX ADMIN — HEPARIN SODIUM 5000 UNIT(S): 5000 INJECTION INTRAVENOUS; SUBCUTANEOUS at 05:11

## 2023-09-19 RX ADMIN — AMLODIPINE BESYLATE 10 MILLIGRAM(S): 2.5 TABLET ORAL at 05:11

## 2023-09-19 RX ADMIN — POLYETHYLENE GLYCOL 3350 17 GRAM(S): 17 POWDER, FOR SOLUTION ORAL at 11:30

## 2023-09-19 RX ADMIN — LIDOCAINE 1 PATCH: 4 CREAM TOPICAL at 19:00

## 2023-09-19 RX ADMIN — Medication 100 MILLIGRAM(S): at 14:27

## 2023-09-19 RX ADMIN — PANTOPRAZOLE SODIUM 40 MILLIGRAM(S): 20 TABLET, DELAYED RELEASE ORAL at 05:10

## 2023-09-19 NOTE — DISCHARGE NOTE PROVIDER - NSDCCPCAREPLAN_GEN_ALL_CORE_FT
PRINCIPAL DISCHARGE DIAGNOSIS  Diagnosis: WILY (acute kidney injury)  Assessment and Plan of Treatment: You were admitted with elevated/imparired kidney funciton. Your function was monitored, and you were seen by the kidney doctor. Matt de leon you had very little to eat over the past month, and/or ct scan done to evaluate the cause of your de reased appetie and weight loss. Please follow with Dr. Sweet (kidney doctor) to continue to monitor / manage your kidney function.      SECONDARY DISCHARGE DIAGNOSES  Diagnosis: Gastric mass  Assessment and Plan of Treatment: You were admitted with complaint of decreased appetite and weight loss. CT Scan of the abdomen showed you have a gastric mass concerned for tumor. An endoscopy done showed you have alarge sub mucosal lesion. We are also recommending you follow with Dr. Breonna Lombardi interventional gastroenterologist at [facility] for endoscopy ultrasound / biopsy. Please call and make an appointment.    Diagnosis: Anemia  Assessment and Plan of Treatment: You hemoglobin was monitored while admitted. Your levels remained stable. Please follow with your primary medical doctor on discharge.    Diagnosis: Weakness  Assessment and Plan of Treatment: Your symptoms have improved. Continue current management.    Diagnosis: Abnormal finding of diagnostic imaging  Assessment and Plan of Treatment: CT Scan of the abdomen and pelvis showed a small hypoattenuating lesion at the gallbladder fossa, incompletely characterized. Hypoattenuating left renal lesions are stable compared with the prior CT Scan on 8/16/2021, likely benign cysts. We recommend you follow outpatient.     PRINCIPAL DISCHARGE DIAGNOSIS  Diagnosis: WILY (acute kidney injury)  Assessment and Plan of Treatment: You were admitted with elevated/imparired kidney funciton. Your function was monitored, and you were seen by the kidney doctor. Matt becgrant you had very little to eat over the past month, and/or ct scan done to evaluate the cause of your de reased appetie and weight loss. Please follow with Dr. Sweet (kidney doctor) to continue to monitor / manage your kidney function.      SECONDARY DISCHARGE DIAGNOSES  Diagnosis: Gastric mass  Assessment and Plan of Treatment: You were admitted with complaint of decreased appetite and weight loss. CT Scan of the abdomen showed you have a gastric mass concerned for tumor. An endoscopy done showed you have alarge sub mucosal lesion. We are also recommending you follow with Dr. Breonna Lombardi interventional gastroenterologist at [facility] for endoscopy ultrasound / biopsy. You will need to follow with Dr. Hon Peacock (oncologist). Please call and make an appointment.    Diagnosis: Anemia  Assessment and Plan of Treatment: You hemoglobin was monitored while admitted. Your levels remained stable. Please follow with your primary medical doctor on discharge.    Diagnosis: Weakness  Assessment and Plan of Treatment: Your symptoms have improved. Continue current management.    Diagnosis: Abnormal finding of diagnostic imaging  Assessment and Plan of Treatment: CT Scan of the abdomen and pelvis showed a small hypoattenuating lesion at the gallbladder fossa, incompletely characterized. Hypoattenuating left renal lesions are stable compared with the prior CT Scan on 8/16/2021, likely benign cysts. We recommend you follow outpatient.

## 2023-09-19 NOTE — DISCHARGE NOTE PROVIDER - NPI NUMBER (FOR SYSADMIN USE ONLY) :
[[id number]],[[id number]] [[id number]],[[id number]],[[id number]] [[id number]],[[id number]],[[id number]],[[id number]]

## 2023-09-19 NOTE — DISCHARGE NOTE PROVIDER - DETAILS OF MALNUTRITION DIAGNOSIS/DIAGNOSES
This patient has been assessed with a concern for Malnutrition and was treated during this hospitalization for the following Nutrition diagnosis/diagnoses:     -  09/18/2023: Severe protein-calorie malnutrition

## 2023-09-19 NOTE — DISCHARGE NOTE PROVIDER - NSDCCAREPROVSEEN_GEN_ALL_CORE_FT
Carla, Jessy Quispe, Purnima Sweet, Den Oropeza, Brianna Griffin, Reyes Garrett, Carlos Green, Cheng Brooks, Nu Hua, Louie

## 2023-09-19 NOTE — PROGRESS NOTE ADULT - PROBLEM SELECTOR PLAN 1
Pt presents with fatigue, weakness, weight loss, decreased appetite x1 month   - CT Chest, Abd/Pelvis with IV contrast: Exophytic lesion arising from the gastric fundus measuring 4.1 x 5.3 x 5.2 cm, increased in size from CT 8/16/2021 where it measured 3.0 x 3.4 x 3.3 cm.. This may represent a solid mass such as a GIST. Differential includes gastric diverticulum, which is considered less likely.   - lipase: 106  - Nutrition consult   - Pt with new anemia, worsening kidney function, compression fx, f/u SPEP, UPEP to r/o multiple myeloma though low on differential   - GI Dr. Hua consulted, f/u recs, pt may need further evaluation with EGD/colonoscopy  - Heme/onc Dr. Toth consulted, f/u recs
Pt presents with fatigue, weakness, weight loss, decreased appetite x1 month   - CT Chest, Abd/Pelvis with IV contrast: Exophytic lesion arising from the gastric fundus measuring 4.1 x 5.3 x 5.2 cm, increased in size from CT 8/16/2021 where it measured 3.0 x 3.4 x 3.3 cm.. This may represent a solid mass such as a GIST. Differential includes gastric diverticulum, which is considered less likely.   - lipase: 106  - Nutrition consult   - Pt with new anemia, worsening kidney function, compression fx, f/u SPEP, UPEP to r/o multiple myeloma though low on differential   - GI Dr. Hua consulted  - Heme/onc Dr. Toth consulted  Patient s/p EGD ulcer found with lesion  Plan is for OUTPATIENT EUS and eventual outpatient Surg/onc  at this time monitor hb (BASELINE of 12--> now 8.2) with borderline "OK" b12  protonix bid, carafate, SUBQ b12--> maintain hb > 8

## 2023-09-19 NOTE — CONSULT NOTE ADULT - SUBJECTIVE AND OBJECTIVE BOX
INCOMPLETE NOTE.  Documentation in Progress  PT SEEN AND EVALUATED.   FULL/ADDITIONAL RECOMMENDATIONS TO FOLLOW   ***************************************************************    Patient is a 73y old  Male who presents with a chief complaint of anemia, gastric mass concerning for malignancy, wily (19 Sep 2023 14:38)      HPI:  73-year-old male with PMH of hypertension and BPH presents with generalized fatigue, anorexia, and weight loss for the past month.  Daughter Silvia at bedside,  offered and declined, patient preferred Daughter to translate. Patient has been visiting the [place] since early July.  Daughter reports generalized lethargy, fatigue, generalized weakness, lightheadedness, loss of appetite, and weight loss for the past month. Unsure of exact amount of weight loss, about ~15 lb weight loss, but visible loss of "belly size". Denies abdominal pain, nausea, vomiting, diarrhea. Reports some constipation since yesterday and small marble-like stool. Denies melena, hematochezia, hematuria, hemoptysis. Last colonoscopy was 10yrs ago which was unremarkable as per pt, pt did have an endoscopy "many years ago" which was also unremarkable. Denies FH colon cancer or gastric cancer. Per daughter patient is usually able to complete ADLs independently and walk around without assistance, but recently over the past month has required to hold onto walls for support while walking. Denies fever, chills, body aches, cp, sob, palpitations, urinary sx, headache, dizziness, bowel or bladder incontinence.   Earlier in the summer, pt was having back pain, was seen seen in a hospital in the [facility], was told that he had a compression fracture at that time. Pt does endorse pain in R subscapular area for over the past month, intermittent, dull, he has been taking Tylenol prn for pain.   Of note, pt was seen in a hospital at [facility] yesterday for evaluation of his weakness and was told that his hemoglobin was low at 8.7. He was recommended getting a  transfusion at that time which the pt and daughter both declined.  Pt and daughter flew back to [place] earlier today for further workup. Daughter states other lab work at the hospital was unremarkable, urine unremarkable,     ED COURSE:  Vitals: T 98.2, HR 86, /67, RR 16, SpO2 100% RA  Labs significant for: Hgb/Hct 8.9/26.8, BUN/Cr 64/5.70, K 3.8, Lipase 106  U/A: Moderate blood, Nitrite neg, Leuk Esterase Neg, occasional bacteria, squamous cells present  COVID neg  Imaging:  -CT head noncontrast: 1. No evidence of acute territorial infarction, hemorrhage or mass effect. 2. Findings most concordant with chronic microvascular ischemic change.   -CT Chest, Abd/Pelvis with IV contrast: Exophytic lesion arising from the gastric fundus with interval increase in size since CT 8/16/2021. This may represent a solid mass such as a GIST. Differential includes gastric diverticulum, which is considered less likely. Consider CT with oral contrast for further evaluation.   Age indeterminate compression fracture deformity involving the T8 vertebral body. Correlate for point tenderness.  EKG: Normal sinus rhythm rate 76, Normal ECG When compared with ECG of 07-JAN-2022 10:24, No significant change was found Confirmed by Chantal Metz ([id number]) on 9/17/2023 2:37:49 PM  Pt received:  1 L NS bolus x1, started on sodium chloride 0.45% at 75 cc/hr (17 Sep 2023 16:49)      PAST MEDICAL & SURGICAL HISTORY:  HTN (hypertension)      BPH (benign prostatic hyperplasia)      History of appendectomy         HEALTH ISSUES - PROBLEM Dx:  Gastric tumor    Anemia    WILY (acute kidney injury)    Need for prophylactic measure    HTN (hypertension)    BPH (benign prostatic hyperplasia)    Imaging finding    Compression fracture of spine    Abnormal finding on imaging          Acute renal failure [N17.9]    No pertinent past medical history [608098795]    HTN (hypertension) [I10]    BPH (benign prostatic hyperplasia) [N40.0]    No significant past surgical history [209584153]    History of appendectomy [Z90.49]    Gastric mass [K31.89]    Anemia [D64.9]    Weakness [R53.1]    Abnormal finding of diagnostic imaging [R93.89]      FAMILY HISTORY:  FH: CAD (coronary artery disease)    FH: diabetes mellitus        [SOCIAL HISTORY: ]     smoking:  none currently     EtOH:  none currently     illicit drugs:  none currently     occupation:  Retired     marital status:       Other:       [ALLERGIES/INTOLERANCES:]  Allergies    No Known Allergies    Intolerances        [MEDICATIONS]  MEDICATIONS  (STANDING):  allopurinol 100 milliGRAM(s) Oral daily  heparin   Injectable 5000 Unit(s) SubCutaneous every 12 hours  lidocaine   4% Patch 1 Patch Transdermal every 24 hours  pantoprazole    Tablet 40 milliGRAM(s) Oral every 12 hours  polyethylene glycol 3350 17 Gram(s) Oral daily  sodium chloride 0.45%. 1000 milliLiter(s) (75 mL/Hr) IV Continuous <Continuous>  tamsulosin 0.4 milliGRAM(s) Oral at bedtime    MEDICATIONS  (PRN):  acetaminophen     Tablet .. 650 milliGRAM(s) Oral every 6 hours PRN Mild Pain (1 - 3)      [REVIEW OF SYSTEMS: ]  CONSTITUTIONAL: normal, no fever, no shakes, no chills   EYES: No eye pain, no visual disturbances, no discharge  ENMT:  no discharge  NECK: No pain, no stiffness  BREASTS: No pain, no masses, no nipple discharge  RESPIRATORY: No cough, no wheezing, no chills, no hemoptysis; No shortness of breath  CARDIOVASCULAR: No chest pain, no palpitations, no dizziness, no leg swelling  GASTROINTESTINAL: No abdominal, no epigastric pain. No nausea, no vomiting, no hematemesis; No diarrhea , no constipation. No melena, no hematochezia.  GENITOURINARY: No dysuria, no frequency, no hematuria, no incontinence  NEUROLOGICAL: No headaches, no memory loss, no loss of strength, no numbness, no tremors  SKIN: No itching, no burning, no rashes, no lesions   LYMPH NODES: No enlarged glands  ENDOCRINE: No heat or cold intolerance; No hair loss  MUSCULOSKELETAL: No joint pain or swelling; No muscle, no back, no extremity pain  PSYCHIATRIC: No depression, no anxiety, no mood swings, no difficulty sleeping  HEME/LYMPH: No easy bruising, no bleeding gums    [VITALS SIGNS 24hrs]  Vital Signs Last 24 Hrs  T(C): 36.7 (19 Sep 2023 12:28), Max: 36.7 (19 Sep 2023 12:28)  T(F): 98.1 (19 Sep 2023 12:28), Max: 98.1 (19 Sep 2023 12:28)  HR: 79 (19 Sep 2023 12:28) (78 - 89)  BP: 118/59 (19 Sep 2023 12:28) (108/57 - 118/59)  BP(mean): --  RR: 18 (19 Sep 2023 12:28) (18 - 18)  SpO2: 96% (19 Sep 2023 12:28) (95% - 98%)    Parameters below as of 19 Sep 2023 12:28  Patient On (Oxygen Delivery Method): room air      Daily     Daily     I&O's Summary      [PHYSICAL EXAM]  GEN:   HEENT: normocephalic and atraumatic. EOMI. PERRL.    NECK: Supple.  No lymphadenopathy   LUNGS: Clear to auscultation.  HEART: S1S2 Regular rate and rhythm, no MRG  ABDOMEN: Soft, nontender, and nondistended.  Positive bowel sounds.    : No CVA tenderness  EXTREMITIES: Without edema.  NEUROLOGIC: grossly intact.  PSYCHIATRIC: Appropriate affect .  SKIN: No rash     [LABS: ]                        8.2    4.64  )-----------( 171      ( 19 Sep 2023 07:22 )             25.0     CBC Full  -  ( 19 Sep 2023 07:22 )  WBC Count : 4.64 K/uL  RBC Count : 2.81 M/uL  Hemoglobin : 8.2 g/dL  Hematocrit : 25.0 %  Platelet Count - Automated : 171 K/uL  Mean Cell Volume : 89.0 fl  Mean Cell Hemoglobin : 29.2 pg  Mean Cell Hemoglobin Concentration : 32.8 gm/dL  Auto Neutrophil # : 2.12 K/uL  Auto Lymphocyte # : 2.06 K/uL  Auto Monocyte # : 0.31 K/uL  Auto Eosinophil # : 0.08 K/uL  Auto Basophil # : 0.02 K/uL  Auto Neutrophil % : 45.7 %  Auto Lymphocyte % : 44.4 %  Auto Monocyte % : 6.7 %  Auto Eosinophil % : 1.7 %  Auto Basophil % : 0.4 %    09-19    145  |  112<H>  |  51<H>  ----------------------------<  98  4.5   |  26  |  4.70<H>    Ca    9.6      19 Sep 2023 07:22  Phos  4.7     09-19  Mg     1.7     09-19    TPro  6.1  /  Alb  3.1<L>  /  TBili  0.3  /  DBili  x   /  AST  16  /  ALT  25  /  AlkPhos  53  09-19      LIVER FUNCTIONS - ( 19 Sep 2023 07:22 )  Alb: 3.1 g/dL / Pro: 6.1 g/dL / ALK PHOS: 53 U/L / ALT: 25 U/L / AST: 16 U/L / GGT: x               Urinalysis Basic - ( 19 Sep 2023 07:22 )    Color: x / Appearance: x / SG: x / pH: x  Gluc: 98 mg/dL / Ketone: x  / Bili: x / Urobili: x   Blood: x / Protein: x / Nitrite: x   Leuk Esterase: x / RBC: x / WBC x   Sq Epi: x / Non Sq Epi: x / Bacteria: x          CBC TREND (5 Days)  WBC Count: 4.64 K/uL (09-19 @ 07:22)  WBC Count: 4.78 K/uL (09-18 @ 07:08)  WBC Count: 5.71 K/uL (09-17 @ 12:45)    Hemoglobin: 8.2 g/dL (09-19 @ 07:22)  Hemoglobin: 8.6 g/dL (09-18 @ 07:08)  Hemoglobin: 8.9 g/dL (09-17 @ 12:45)    Hematocrit: 25.0 % (09-19 @ 07:22)  Hematocrit: 26.3 % (09-18 @ 07:08)  Hematocrit: 26.8 % (09-17 @ 12:45)    Platelet Count - Automated: 171 K/uL (09-19 @ 07:22)  Platelet Count - Automated: 170 K/uL (09-18 @ 07:08)  Platelet Count - Automated: 190 K/uL (09-17 @ 12:45)    Reticulocyte Percent: 1.0 % (09-18 @ 07:08)      Ferritin: 686 ng/mL (09-18 @ 07:08)    Iron Total: 81 ug/dL (09-18 @ 07:08)     Vitamin B12, Serum: 315 pg/mL (09-18 @ 07:08)     Folate, Serum: 5.7 ng/mL (09-18 @ 07:08)                              [MICROBIOLOGY /  VIROLOGY:]  COVID-19 PCR: NotDetec (17 Sep 2023 12:45)        Culture - Urine (collected 17 Sep 2023 12:40)  Source: Clean Catch Clean Catch (Midstream)  Final Report (19 Sep 2023 00:28):    <10,000 CFU/mL Normal Urogenital Denisse        [PATHOLOGY]       [RADIOLOGY & ADDITIONAL STUDIES:]        Patient is a 73y old  Male who presents with a chief complaint of anemia, gastric mass concerning for malignancy, wily (19 Sep 2023 14:38)    HPI:  73-year-old male with PMH of hypertension and BPH presents with generalized fatigue, anorexia, and weight loss for the past month.  Daughter Silvia at bedside,  offered and declined, patient preferred Daughter to translate. Patient has been visiting the [place] since early July.  Daughter reports generalized lethargy, fatigue, generalized weakness, lightheadedness, loss of appetite, and weight loss for the past month. Unsure of exact amount of weight loss, about ~15 lb weight loss, but visible loss of "belly size". Denies abdominal pain, nausea, vomiting, diarrhea. Reports some constipation since yesterday and small marble-like stool. Denies melena, hematochezia, hematuria, hemoptysis. Last colonoscopy was 10yrs ago which was unremarkable as per pt, pt did have an endoscopy "many years ago" which was also unremarkable. Denies FH colon cancer or gastric cancer. Per daughter patient is usually able to complete ADLs independently and walk around without assistance, but recently over the past month has required to hold onto walls for support while walking. Denies fever, chills, body aches, cp, sob, palpitations, urinary sx, headache, dizziness, bowel or bladder incontinence.   Earlier in the summer, pt was having back pain, was seen seen in a hospital in the [facility], was told that he had a compression fracture at that time. Pt does endorse pain in R subscapular area for over the past month, intermittent, dull, he has been taking Tylenol prn for pain.   Of note, pt was seen in a hospital at [facility] yesterday for evaluation of his weakness and was told that his hemoglobin was low at 8.7. He was recommended getting a  transfusion at that time which the pt and daughter both declined.  Pt and daughter flew back to [place] earlier today for further workup. Daughter states other lab work at the hospital was unremarkable, urine unremarkable,     ED COURSE:  Vitals: T 98.2, HR 86, /67, RR 16, SpO2 100% RA  Labs significant for: Hgb/Hct 8.9/26.8, BUN/Cr 64/5.70, K 3.8, Lipase 106  U/A: Moderate blood, Nitrite neg, Leuk Esterase Neg, occasional bacteria, squamous cells present  COVID neg  Imaging:  -CT head noncontrast: 1. No evidence of acute territorial infarction, hemorrhage or mass effect. 2. Findings most concordant with chronic microvascular ischemic change.   -CT Chest, Abd/Pelvis with IV contrast: Exophytic lesion arising from the gastric fundus with interval increase in size since CT 8/16/2021. This may represent a solid mass such as a GIST. Differential includes gastric diverticulum, which is considered less likely. Consider CT with oral contrast for further evaluation.   Age indeterminate compression fracture deformity involving the T8 vertebral body. Correlate for point tenderness.  EKG: Normal sinus rhythm rate 76, Normal ECG When compared with ECG of 07-JAN-2022 10:24, No significant change was found Confirmed by Chantal Metz ([id number]) on 9/17/2023 2:37:49 PM  Pt received:  1 L NS bolus x1, started on sodium chloride 0.45% at 75 cc/hr (17 Sep 2023 16:49)      PAST MEDICAL & SURGICAL HISTORY:  HTN (hypertension)  BPH (benign prostatic hyperplasia)  History of appendectomy       HEALTH ISSUES - PROBLEM Dx:  Gastric tumor  Anemia  WILY (acute kidney injury)  Need for prophylactic measure  HTN (hypertension)  BPH (benign prostatic hyperplasia)  Imaging finding  Compression fracture of spine  Abnormal finding on imaging    Acute renal failure [N17.9]  No pertinent past medical history [974121019]  HTN (hypertension) [I10]  BPH (benign prostatic hyperplasia) [N40.0]  No significant past surgical history [501181451]  History of appendectomy [Z90.49]  Gastric mass [K31.89]  Anemia [D64.9]  Weakness [R53.1]  Abnormal finding of diagnostic imaging [R93.89]      FAMILY HISTORY:  FH: CAD (coronary artery disease)  FH: diabetes mellitus      [SOCIAL HISTORY: ]     smoking:  none currently, ex 1PPD smoker, quit 30yrs     EtOH:  none currently, ex-social EtOH on weekend in past     illicit drugs:  none currently     occupation:  Retired, worked for school     marital status:  .      Other: 1 dtr, 1 son.  Born/[facility], prior to immigrating to [place] ~ 30yo.       [ALLERGIES/INTOLERANCES:]  Allergies      No Known Allergies  Intolerances    [MEDICATIONS]  MEDICATIONS  (STANDING):  allopurinol 100 milliGRAM(s) Oral daily  heparin   Injectable 5000 Unit(s) SubCutaneous every 12 hours  lidocaine   4% Patch 1 Patch Transdermal every 24 hours  pantoprazole    Tablet 40 milliGRAM(s) Oral every 12 hours  polyethylene glycol 3350 17 Gram(s) Oral daily  sodium chloride 0.45%. 1000 milliLiter(s) (75 mL/Hr) IV Continuous <Continuous>  tamsulosin 0.4 milliGRAM(s) Oral at bedtime    MEDICATIONS  (PRN):  acetaminophen     Tablet .. 650 milliGRAM(s) Oral every 6 hours PRN Mild Pain (1 - 3)      [REVIEW OF SYSTEMS: ]  CONSTITUTIONAL: normal, no fever, no shakes, no chills   EYES: No eye pain, no visual disturbances, no discharge  ENMT:  no discharge  NECK: No pain, no stiffness  BREASTS: No pain, no masses, no nipple discharge  RESPIRATORY: No cough, no wheezing, no chills, no hemoptysis; No shortness of breath  CARDIOVASCULAR: No chest pain, no palpitations, no dizziness, no leg swelling  GASTROINTESTINAL: No abdominal, no epigastric pain. No nausea, no vomiting, no hematemesis; No diarrhea , no constipation. No melena, no hematochezia.  GENITOURINARY: No dysuria, no frequency, no hematuria, no incontinence  NEUROLOGICAL: No headaches, no memory loss, no loss of strength, no numbness, no tremors  SKIN: No itching, no burning, no rashes, no lesions   LYMPH NODES: No enlarged glands  ENDOCRINE: No heat or cold intolerance; No hair loss  MUSCULOSKELETAL: No joint pain or swelling; No muscle, no back, no extremity pain  PSYCHIATRIC: No depression, no anxiety, no mood swings, no difficulty sleeping  HEME/LYMPH: No easy bruising, no bleeding gums      [VITALS SIGNS 24hrs]  Vital Signs Last 24 Hrs  T(C): 36.7 (19 Sep 2023 12:28), Max: 36.7 (19 Sep 2023 12:28)  T(F): 98.1 (19 Sep 2023 12:28), Max: 98.1 (19 Sep 2023 12:28)  HR: 79 (19 Sep 2023 12:28) (78 - 89)  BP: 118/59 (19 Sep 2023 12:28) (108/57 - 118/59)  BP(mean): --  RR: 18 (19 Sep 2023 12:28) (18 - 18)  SpO2: 96% (19 Sep 2023 12:28) (95% - 98%)    Parameters below as of 19 Sep 2023 12:28  Patient On (Oxygen Delivery Method): room air    Daily     I&O's Summary      [PHYSICAL EXAM]  GEN: WN ND  HEENT: normocephalic and atraumatic. EOMI. .    NECK: Supple.  No lymphadenopathy   LUNGS: Clear to auscultation.  HEART: S1S2 Regular rate and rhythm, no MRG  ABDOMEN: Soft, nontender, and nondistended.  Positive bowel sounds.    : No CVA tenderness  EXTREMITIES: Without edema.  NEUROLOGIC: grossly intact.  PSYCHIATRIC: Appropriate affect .  SKIN: No rash     [LABS: ]                        8.2    4.64  )-----------( 171      ( 19 Sep 2023 07:22 )             25.0     CBC Full  -  ( 19 Sep 2023 07:22 )  WBC Count : 4.64 K/uL  RBC Count : 2.81 M/uL  Hemoglobin : 8.2 g/dL  Hematocrit : 25.0 %  Platelet Count - Automated : 171 K/uL  Mean Cell Volume : 89.0 fl  Mean Cell Hemoglobin : 29.2 pg  Mean Cell Hemoglobin Concentration : 32.8 gm/dL  Auto Neutrophil # : 2.12 K/uL  Auto Lymphocyte # : 2.06 K/uL  Auto Monocyte # : 0.31 K/uL  Auto Eosinophil # : 0.08 K/uL  Auto Basophil # : 0.02 K/uL  Auto Neutrophil % : 45.7 %  Auto Lymphocyte % : 44.4 %  Auto Monocyte % : 6.7 %  Auto Eosinophil % : 1.7 %  Auto Basophil % : 0.4 %    09-19    145  |  112<H>  |  51<H>  ----------------------------<  98  4.5   |  26  |  4.70<H>    Ca    9.6      19 Sep 2023 07:22  Phos  4.7     09-19  Mg     1.7     09-19    TPro  6.1  /  Alb  3.1<L>  /  TBili  0.3  /  DBili  x   /  AST  16  /  ALT  25  /  AlkPhos  53  09-19      LIVER FUNCTIONS - ( 19 Sep 2023 07:22 )  Alb: 3.1 g/dL / Pro: 6.1 g/dL / ALK PHOS: 53 U/L / ALT: 25 U/L / AST: 16 U/L / GGT: x               Urinalysis Basic - ( 19 Sep 2023 07:22 )    Color: x / Appearance: x / SG: x / pH: x  Gluc: 98 mg/dL / Ketone: x  / Bili: x / Urobili: x   Blood: x / Protein: x / Nitrite: x   Leuk Esterase: x / RBC: x / WBC x   Sq Epi: x / Non Sq Epi: x / Bacteria: x          CBC TREND (5 Days)  WBC Count: 4.64 K/uL (09-19 @ 07:22)  WBC Count: 4.78 K/uL (09-18 @ 07:08)  WBC Count: 5.71 K/uL (09-17 @ 12:45)    Hemoglobin: 8.2 g/dL (09-19 @ 07:22)  Hemoglobin: 8.6 g/dL (09-18 @ 07:08)  Hemoglobin: 8.9 g/dL (09-17 @ 12:45)    Hematocrit: 25.0 % (09-19 @ 07:22)  Hematocrit: 26.3 % (09-18 @ 07:08)  Hematocrit: 26.8 % (09-17 @ 12:45)    Platelet Count - Automated: 171 K/uL (09-19 @ 07:22)  Platelet Count - Automated: 170 K/uL (09-18 @ 07:08)  Platelet Count - Automated: 190 K/uL (09-17 @ 12:45)    Reticulocyte Percent: 1.0 % (09-18 @ 07:08)      Ferritin: 686 ng/mL (09-18 @ 07:08)    Iron Total: 81 ug/dL (09-18 @ 07:08)     Vitamin B12, Serum: 315 pg/mL (09-18 @ 07:08)     Folate, Serum: 5.7 ng/mL (09-18 @ 07:08)       [MICROBIOLOGY /  VIROLOGY:]  COVID-19 PCR: NotDetec (17 Sep 2023 12:45)      Culture - Urine (collected 17 Sep 2023 12:40)  Source: Clean Catch Clean Catch (Midstream)  Final Report (19 Sep 2023 00:28):    <10,000 CFU/mL Normal Urogenital Denisse      [PATHOLOGY]       [RADIOLOGY & ADDITIONAL STUDIES:]       ACC: [id number] EXAM:  CT CHEST IC   ORDERED BY: LOGAN JONES   ACC: [id number] EXAM:  CT ABDOMEN AND PELVIS IC   ORDERED BY: LOGAN JONES   PROCEDURE DATE:  09/17/2023    INTERPRETATION:  CLINICAL INFORMATION: Fatigue and anorexia for one month. Weight loss. Anemia.  COMPARISON: CT 8/16/2021  PROCEDURE:  CT of the Chest, Abdomen and Pelvis was performed.  Sagittal and coronal reformats were performed.    FINDINGS:  CHEST:  LUNGS AND LARGE AIRWAYS: Patent central airways. No pulmonary nodules or parenchymal consolidation.  PLEURA: No pleural effusion.  VESSELS: Within normal limits.  HEART: Heart size is normal. No pericardial effusion.  MEDIASTINUM AND TOMÁS: No lymphadenopathy.  CHEST WALL AND LOWERNECK: Within normal limits.    ABDOMEN AND PELVIS:  LIVER: Normal size and morphology. Small hypoattenuating lesion at the gallbladder fossa, incompletely characterized.  BILE DUCTS: Normal caliber.  GALLBLADDER: Within normal limits.  SPLEEN: Within normal limits.  PANCREAS: Within normal limits.  ADRENALS: Within normal limits.  KIDNEYS/URETERS: Hypoattenuating left renal lesions are stable compared with the prior CT 8/16/2021, likely benign cysts. No calculus or   hydronephrosis. Symmetricrenal enhance   BLADDER: Within normal limits.  REPRODUCTIVE ORGANS: Prostate within normal limits.  BOWEL: No bowel obstruction. Appendix is not visualized. No evidence of inflammation in the pericecal region. There is an exophytic lesion   arising from the gastric fundus measuring 4.1 x 5.3 x 5.2 cm (AP x TR x CC), increased in size from CT 8/16/2021 where it measured 3.0 x 3.4 x 3.3 cm.  PERITONEUM: No ascites.  VESSELS: Within normal limits. Retroaortic left renal vein.  RETROPERITONEUM/LYMPH NODES: No lymphadenopathy.  ABDOMINAL WALL: Within normal limits.  BONES: Bones are osteopenic with degenerative changes throughout the thoracolumbar spine. Age indeterminate compression fracture deformity of T8 vertebral body.    IMPRESSION:  Exophytic lesion arising from the gastric fundus with interval increase in size since CT 8/16/2021. This may represent a solid mass such as a   GIST. Differential includes gastric diverticulum, which is considered less likely. Consider CT with oral contrast for further evaluation.  Age indeterminate compression fracture deformity involving the T8 vertebral body. Correlate for point tenderness.  --- End of Report ---   ANAHY COOMBS DO; Attending Radiologist  This document has been electronically signed. Sep 17 2023  3:27PM        ACC: [id number] EXAM:  CT BRAIN   ORDERED BY: LOGAN JONES   PROCEDURE DATE:  09/17/2023    INTERPRETATION:  CLINICAL STATEMENT: Dizziness 1 month.  TECHNIQUE: Noncontrast CT of the brain was performed. Beam hardening artifact from thepetrous pyramid limits evaluation of the brainstem and posterior fossa. KV and MA adjusted according to patient's body habitus. Sagittal and coronal reformatted images provided.  COMPARISON: No similar prior studies for comparison.    FINDINGS:  Mucosal thickening of the visualized paranasal sinuses. No air-fluid levels visualized. Visualized mastoid air cells and middle ear regions   well-aerated. Probable cerumen left external auditory canal. No aggressive calvarial lesion.  Mild parenchymal atrophy, eccentric in bifrontal regions. Patchy hypoattenuation periventricular and juxtacortical white matter bilateral   cerebral hemispheres; a nonspecific finding which statistically reflects chronic microvascular ischemic change.  No evidence of extra-axial collection, intracranial hemorrhage, mass or   mass effect. Gray-white matter differentiation appears preserved.    IMPRESSION:  1. No evidence of acute territorial infarction, hemorrhage or mass effect.  2. Findings most concordant with chronic microvascular ischemic change.  3. Additional findings described in detail above. If clinical symptoms persist consider further imaging with MRI, provided there are no medical contraindications.  --- End of Report ---  JOSEPH GUEVARA M.D., ATTENDING RADIOLOGIST  This document has been electronically signed. Sep 17 2023  3:26PM

## 2023-09-19 NOTE — PROGRESS NOTE ADULT - PROBLEM SELECTOR PLAN 5
Chronic   - Stable  - Continue home amlodipine 10mg QD with hold parameters   - Will HOLD home lisinopril-HCTZ 10-12.5mg in the setting of WILY, may restart once kidney function improves   - Monitor routine hemodynamics
Chronic   - Stable  - Continue home amlodipine 10mg QD with hold parameters   - Will HOLD home lisinopril-HCTZ 10-12.5mg in the setting of WILY, may restart once kidney function improves   - Monitor routine hemodynamics

## 2023-09-19 NOTE — PROGRESS NOTE ADULT - NSPROGADDITIONALINFOA_GEN_ALL_CORE
EGD today  monitor Cr      baseline hb 12 in 2023 jan  baseline GFR 63  in jan 2023
D/w nephro  monitor Cr  may need biospy;  serologies to be sent  Patient medically active

## 2023-09-19 NOTE — DISCHARGE NOTE PROVIDER - ATTENDING DISCHARGE PHYSICAL EXAMINATION:
Vital Signs Last 24 Hrs  T(C): 36.6 (26 Sep 2023 12:28), Max: 36.7 (25 Sep 2023 20:57)  T(F): 97.9 (26 Sep 2023 12:28), Max: 98.1 (25 Sep 2023 20:57)  HR: 84 (26 Sep 2023 12:28) (73 - 84)  BP: 134/69 (26 Sep 2023 12:28) (131/65 - 144/69)  BP(mean): --  RR: 17 (26 Sep 2023 12:28) (17 - 18)  SpO2: 94% (26 Sep 2023 12:28) (94% - 96%)    Parameters below as of 26 Sep 2023 12:28  Patient On (Oxygen Delivery Method): room air    General: NAD,   Neurology: A&Ox3, nonfocal,  moving all extremities  Respiratory: CTA B/L  CV: RRR, S1S2, no murmurs, rubs or gallops  Abdominal: Soft, NT, ND +BS  Extremities: No edema, + peripheral pulses

## 2023-09-19 NOTE — PROGRESS NOTE ADULT - PROBLEM SELECTOR PLAN 8
DVT PPX: heparin 5000U q12 g    #Dispo  - PT consult
DVT PPX: heparin 5000U q12 g    #Dispo  - PT consult

## 2023-09-19 NOTE — DISCHARGE NOTE PROVIDER - HOSPITAL COURSE
HPI:  73-year-old male with PMH of hypertension and BPH presents with generalized fatigue, anorexia, and weight loss for the past month.  Daughter Silvia at bedside,  offered and declined, patient preferred Daughter to translate. Patient has been visiting the [place] since early July.  Daughter reports generalized lethargy, fatigue, generalized weakness, lightheadedness, loss of appetite, and weight loss for the past month. Unsure of exact amount of weight loss, about ~15 lb weight loss, but visible loss of "belly size". Denies abdominal pain, nausea, vomiting, diarrhea. Reports some constipation since yesterday and small marble-like stool. Denies melena, hematochezia, hematuria, hemoptysis. Last colonoscopy was 10yrs ago which was unremarkable as per pt, pt did have an endoscopy "many years ago" which was also unremarkable. Denies FH colon cancer or gastric cancer. Per daughter patient is usually able to complete ADLs independently and walk around without assistance, but recently over the past month has required to hold onto walls for support while walking. Denies fever, chills, body aches, cp, sob, palpitations, urinary sx, headache, dizziness, bowel or bladder incontinence.   Earlier in the summer, pt was having back pain, was seen seen in a hospital in the [facility], was told that he had a compression fracture at that time. Pt does endorse pain in R subscapular area for over the past month, intermittent, dull, he has been taking Tylenol prn for pain.   Of note, pt was seen in a hospital at [facility] yesterday for evaluation of his weakness and was told that his hemoglobin was low at 8.7. He was recommended getting a  transfusion at that time which the pt and daughter both declined.  Pt and daughter flew back to [place] earlier today for further workup. Daughter states other lab work at the hospital was unremarkable, urine unremarkable,     ED COURSE:  Vitals: T 98.2, HR 86, /67, RR 16, SpO2 100% RA  Labs significant for: Hgb/Hct 8.9/26.8, BUN/Cr 64/5.70, K 3.8, Lipase 106  U/A: Moderate blood, Nitrite neg, Leuk Esterase Neg, occasional bacteria, squamous cells present  COVID neg  Imaging:  -CT head noncontrast: 1. No evidence of acute territorial infarction, hemorrhage or mass effect. 2. Findings most concordant with chronic microvascular ischemic change.   -CT Chest, Abd/Pelvis with IV contrast: Exophytic lesion arising from the gastric fundus with interval increase in size since CT 8/16/2021. This may represent a solid mass such as a GIST. Differential includes gastric diverticulum, which is considered less likely. Consider CT with oral contrast for further evaluation.   Age indeterminate compression fracture deformity involving the T8 vertebral body. Correlate for point tenderness.  EKG: Normal sinus rhythm rate 76, Normal ECG When compared with ECG of 07-JAN-2022 10:24, No significant change was found Confirmed by Chantal Metz ([id number]) on 9/17/2023 2:37:49 PM  Pt received:  1 L NS bolus x1, started on sodium chloride 0.45% at 75 cc/hr (17 Sep 2023 16:49)      ---  HOSPITAL COURSE: Patient admitted to medicine floor for management of     Pt seen and examined on day of discharge. Patient is medically optimized for discharge to home with close outpatient followup.    PHYSICAL EXAM ON DAY OF DISCHARGE:        ---  CONSULTANTS:     ---  TIME SPENT:  I, the attending physician, was physically present for the key portions of the evaluation and management (E/M) service provided. The total amount of time spent reviewing the hospital notes, laboratory values, imaging findings, assessing/counseling the patient, discussing with consultant physicians, social work, nursing staff was -- minutes    ---  Primary care provider was made aware of plan for discharge:      [  ] NO     [  ] YES   HPI:  73-year-old male with PMH of hypertension and BPH presents with generalized fatigue, anorexia, and weight loss for the past month.  Daughter Silvia at bedside,  offered and declined, patient preferred Daughter to translate. Patient has been visiting the [place] since early July.  Daughter reports generalized lethargy, fatigue, generalized weakness, lightheadedness, loss of appetite, and weight loss for the past month. Unsure of exact amount of weight loss, about ~15 lb weight loss, but visible loss of "belly size". Denies abdominal pain, nausea, vomiting, diarrhea. Reports some constipation since yesterday and small marble-like stool. Denies melena, hematochezia, hematuria, hemoptysis. Last colonoscopy was 10yrs ago which was unremarkable as per pt, pt did have an endoscopy "many years ago" which was also unremarkable. Denies FH colon cancer or gastric cancer. Per daughter patient is usually able to complete ADLs independently and walk around without assistance, but recently over the past month has required to hold onto walls for support while walking. Denies fever, chills, body aches, cp, sob, palpitations, urinary sx, headache, dizziness, bowel or bladder incontinence.   Earlier in the summer, pt was having back pain, was seen seen in a hospital in the [facility], was told that he had a compression fracture at that time. Pt does endorse pain in R subscapular area for over the past month, intermittent, dull, he has been taking Tylenol prn for pain.   Of note, pt was seen in a hospital at [facility] yesterday for evaluation of his weakness and was told that his hemoglobin was low at 8.7. He was recommended getting a  transfusion at that time which the pt and daughter both declined.  Pt and daughter flew back to [place] earlier today for further workup. Daughter states other lab work at the hospital was unremarkable, urine unremarkable,     ED COURSE:  Vitals: T 98.2, HR 86, /67, RR 16, SpO2 100% RA  Labs significant for: Hgb/Hct 8.9/26.8, BUN/Cr 64/5.70, K 3.8, Lipase 106  U/A: Moderate blood, Nitrite neg, Leuk Esterase Neg, occasional bacteria, squamous cells present  COVID neg  Imaging:  -CT head noncontrast: 1. No evidence of acute territorial infarction, hemorrhage or mass effect. 2. Findings most concordant with chronic microvascular ischemic change.   -CT Chest, Abd/Pelvis with IV contrast: Exophytic lesion arising from the gastric fundus with interval increase in size since CT 8/16/2021. This may represent a solid mass such as a GIST. Differential includes gastric diverticulum, which is considered less likely. Consider CT with oral contrast for further evaluation.   Age indeterminate compression fracture deformity involving the T8 vertebral body. Correlate for point tenderness.  EKG: Normal sinus rhythm rate 76, Normal ECG When compared with ECG of 07-JAN-2022 10:24, No significant change was found Confirmed by Chantal Metz ([id number]) on 9/17/2023 2:37:49 PM  Pt received:  1 L NS bolus x1, started on sodium chloride 0.45% at 75 cc/hr (17 Sep 2023 16:49)      ---  HOSPITAL COURSE: Pt. was admitted with Nephrology and GI consultation.  He was continued on IV hydration.  Urine culture was negative.  Pt. had cardiology preop evaluation and had EGD showing large sub mucosal lesion noted in the gastric cardia.  GE jn noted at 36cm from the incisors, clean based duodenal ulcer.  Pt. was given Protonix twice a day and recommended to have outpatient EUS and eventual outpatient Surg/onc evaluation.  SPEP was abnormal with Bence mendes protein,kappa,lambda light chains.  Pt. had 1 unit PRBC for anemia and had bone marrow biopsy on 9/21.       HPI:  73-year-old male with PMH of hypertension and BPH presents with generalized fatigue, anorexia, and weight loss for the past month.  Daughter Silvia at bedside,  offered and declined, patient preferred Daughter to translate. Patient has been visiting the [place] since early July.  Daughter reports generalized lethargy, fatigue, generalized weakness, lightheadedness, loss of appetite, and weight loss for the past month. Unsure of exact amount of weight loss, about ~15 lb weight loss, but visible loss of "belly size". Denies abdominal pain, nausea, vomiting, diarrhea. Reports some constipation since yesterday and small marble-like stool. Denies melena, hematochezia, hematuria, hemoptysis. Last colonoscopy was 10yrs ago which was unremarkable as per pt, pt did have an endoscopy "many years ago" which was also unremarkable. Denies FH colon cancer or gastric cancer. Per daughter patient is usually able to complete ADLs independently and walk around without assistance, but recently over the past month has required to hold onto walls for support while walking. Denies fever, chills, body aches, cp, sob, palpitations, urinary sx, headache, dizziness, bowel or bladder incontinence.   Earlier in the summer, pt was having back pain, was seen seen in a hospital in the [facility], was told that he had a compression fracture at that time. Pt does endorse pain in R subscapular area for over the past month, intermittent, dull, he has been taking Tylenol prn for pain.   Of note, pt was seen in a hospital at [facility] yesterday for evaluation of his weakness and was told that his hemoglobin was low at 8.7. He was recommended getting a  transfusion at that time which the pt and daughter both declined.  Pt and daughter flew back to [place] earlier today for further workup. Daughter states other lab work at the hospital was unremarkable, urine unremarkable,     ED COURSE:  Vitals: T 98.2, HR 86, /67, RR 16, SpO2 100% RA  Labs significant for: Hgb/Hct 8.9/26.8, BUN/Cr 64/5.70, K 3.8, Lipase 106  U/A: Moderate blood, Nitrite neg, Leuk Esterase Neg, occasional bacteria, squamous cells present  COVID neg  Imaging:  -CT head noncontrast: 1. No evidence of acute territorial infarction, hemorrhage or mass effect. 2. Findings most concordant with chronic microvascular ischemic change.   -CT Chest, Abd/Pelvis with IV contrast: Exophytic lesion arising from the gastric fundus with interval increase in size since CT 8/16/2021. This may represent a solid mass such as a GIST. Differential includes gastric diverticulum, which is considered less likely. Consider CT with oral contrast for further evaluation.   Age indeterminate compression fracture deformity involving the T8 vertebral body. Correlate for point tenderness.  EKG: Normal sinus rhythm rate 76, Normal ECG When compared with ECG of 07-JAN-2022 10:24, No significant change was found Confirmed by Chantal Metz ([id number]) on 9/17/2023 2:37:49 PM  Pt received:  1 L NS bolus x1, started on sodium chloride 0.45% at 75 cc/hr (17 Sep 2023 16:49)      ---  HOSPITAL COURSE: Pt. was admitted with Nephrology and GI consultation.  He was continued on IV hydration.  Urine culture was negative.  Pt. had cardiology preop evaluation and had EGD showing large sub mucosal lesion noted in the gastric cardia.  GE jn noted at 36cm from the incisors, clean based duodenal ulcer.  Pt. was given Protonix twice a day and recommended to have outpatient EUS and eventual outpatient Surg/onc evaluation.  SPEP was abnormal with Bence mendes protein,kappa,lambda light chains.  Pt. had 1 unit PRBC for anemia and had bone marrow biopsy on 9/21.  Renal function has improved during the hospitalization.       HPI:  73-year-old male with PMH of hypertension and BPH presents with generalized fatigue, anorexia, and weight loss for the past month.  Daughter Silvia at bedside,  offered and declined, patient preferred Daughter to translate. Patient has been visiting the [place] since early July.  Daughter reports generalized lethargy, fatigue, generalized weakness, lightheadedness, loss of appetite, and weight loss for the past month. Unsure of exact amount of weight loss, about ~15 lb weight loss, but visible loss of "belly size". Denies abdominal pain, nausea, vomiting, diarrhea. Reports some constipation since yesterday and small marble-like stool. Denies melena, hematochezia, hematuria, hemoptysis. Last colonoscopy was 10yrs ago which was unremarkable as per pt, pt did have an endoscopy "many years ago" which was also unremarkable. Denies FH colon cancer or gastric cancer. Per daughter patient is usually able to complete ADLs independently and walk around without assistance, but recently over the past month has required to hold onto walls for support while walking. Denies fever, chills, body aches, cp, sob, palpitations, urinary sx, headache, dizziness, bowel or bladder incontinence.   Earlier in the summer, pt was having back pain, was seen seen in a hospital in the [facility], was told that he had a compression fracture at that time. Pt does endorse pain in R subscapular area for over the past month, intermittent, dull, he has been taking Tylenol prn for pain.   Of note, pt was seen in a hospital at [facility] yesterday for evaluation of his weakness and was told that his hemoglobin was low at 8.7. He was recommended getting a  transfusion at that time which the pt and daughter both declined.  Pt and daughter flew back to [place] earlier today for further workup. Daughter states other lab work at the hospital was unremarkable, urine unremarkable,     ED COURSE:  Vitals: T 98.2, HR 86, /67, RR 16, SpO2 100% RA  Labs significant for: Hgb/Hct 8.9/26.8, BUN/Cr 64/5.70, K 3.8, Lipase 106  U/A: Moderate blood, Nitrite neg, Leuk Esterase Neg, occasional bacteria, squamous cells present  COVID neg  Imaging:  -CT head noncontrast: 1. No evidence of acute territorial infarction, hemorrhage or mass effect. 2. Findings most concordant with chronic microvascular ischemic change.   -CT Chest, Abd/Pelvis with IV contrast: Exophytic lesion arising from the gastric fundus with interval increase in size since CT 8/16/2021. This may represent a solid mass such as a GIST. Differential includes gastric diverticulum, which is considered less likely. Consider CT with oral contrast for further evaluation.   Age indeterminate compression fracture deformity involving the T8 vertebral body. Correlate for point tenderness.  EKG: Normal sinus rhythm rate 76, Normal ECG When compared with ECG of 07-JAN-2022 10:24, No significant change was found Confirmed by Chantal Metz ([id number]) on 9/17/2023 2:37:49 PM  Pt received:  1 L NS bolus x1, started on sodium chloride 0.45% at 75 cc/hr (17 Sep 2023 16:49)      ---  HOSPITAL COURSE: Pt. was admitted with Nephrology and GI consultation.  He was continued on IV hydration.  Urine culture was negative.  Pt. had cardiology preop evaluation and had EGD showing large sub mucosal lesion noted in the gastric cardia.  GE jn noted at 36cm from the incisors, clean based duodenal ulcer.  Pt. was given Protonix twice a day and recommended to have outpatient EUS and eventual outpatient Surg/onc evaluation.  SPEP was abnormal with Bence mendes protein,kappa,lambda light chains.  Pt. had 1 unit PRBC for anemia and had bone marrow biopsy on 9/21.  Renal function has improved during the hospitalization.  Skeletal survey showed Lucencies in the calvarium suspicious for lytic lesions.  Grade 1 spondylolisthesis of L5 on S1, unchanged.       HPI:  73-year-old male with PMH of hypertension and BPH presents with generalized fatigue, anorexia, and weight loss for the past month.  Daughter Silvia at bedside,  offered and declined, patient preferred Daughter to translate. Patient has been visiting the [place] since early July.  Daughter reports generalized lethargy, fatigue, generalized weakness, lightheadedness, loss of appetite, and weight loss for the past month. Unsure of exact amount of weight loss, about ~15 lb weight loss, but visible loss of "belly size". Denies abdominal pain, nausea, vomiting, diarrhea. Reports some constipation since yesterday and small marble-like stool. Denies melena, hematochezia, hematuria, hemoptysis. Last colonoscopy was 10yrs ago which was unremarkable as per pt, pt did have an endoscopy "many years ago" which was also unremarkable. Denies FH colon cancer or gastric cancer. Per daughter patient is usually able to complete ADLs independently and walk around without assistance, but recently over the past month has required to hold onto walls for support while walking. Denies fever, chills, body aches, cp, sob, palpitations, urinary sx, headache, dizziness, bowel or bladder incontinence.   Earlier in the summer, pt was having back pain, was seen seen in a hospital in the [facility], was told that he had a compression fracture at that time. Pt does endorse pain in R subscapular area for over the past month, intermittent, dull, he has been taking Tylenol prn for pain.   Of note, pt was seen in a hospital at [facility] yesterday for evaluation of his weakness and was told that his hemoglobin was low at 8.7. He was recommended getting a  transfusion at that time which the pt and daughter both declined.  Pt and daughter flew back to [place] earlier today for further workup. Daughter states other lab work at the hospital was unremarkable, urine unremarkable,     ED COURSE:  Vitals: T 98.2, HR 86, /67, RR 16, SpO2 100% RA  Labs significant for: Hgb/Hct 8.9/26.8, BUN/Cr 64/5.70, K 3.8, Lipase 106  U/A: Moderate blood, Nitrite neg, Leuk Esterase Neg, occasional bacteria, squamous cells present  COVID neg  Imaging:  -CT head noncontrast: 1. No evidence of acute territorial infarction, hemorrhage or mass effect. 2. Findings most concordant with chronic microvascular ischemic change.   -CT Chest, Abd/Pelvis with IV contrast: Exophytic lesion arising from the gastric fundus with interval increase in size since CT 8/16/2021. This may represent a solid mass such as a GIST. Differential includes gastric diverticulum, which is considered less likely. Consider CT with oral contrast for further evaluation.   Age indeterminate compression fracture deformity involving the T8 vertebral body. Correlate for point tenderness.  EKG: Normal sinus rhythm rate 76, Normal ECG When compared with ECG of 07-JAN-2022 10:24, No significant change was found Confirmed by Chantal Metz ([id number]) on 9/17/2023 2:37:49 PM  Pt received:  1 L NS bolus x1, started on sodium chloride 0.45% at 75 cc/hr (17 Sep 2023 16:49)      ---  HOSPITAL COURSE: Pt. was admitted with Nephrology and GI consultation.  He was continued on IV hydration.  Urine culture was negative.  Pt. had cardiology preop evaluation and had EGD showing large sub mucosal lesion noted in the gastric cardia.  GE jn noted at 36cm from the incisors, clean based duodenal ulcer.  Pt. was given Protonix twice a day and recommended to have outpatient EUS and eventual outpatient Surg/onc evaluation.  SPEP was abnormal with Bence mendes protein,kappa,lambda light chains.  Pt. had 1 unit PRBC for anemia and had bone marrow biopsy on 9/21.  Renal function has improved during the hospitalization with IV hydration.  Skeletal survey showed Lucencies in the calvarium suspicious for lytic lesions.  Grade 1 spondylolisthesis of L5 on S1, unchanged.         HPI:  73-year-old male with PMH of hypertension and BPH presents with generalized fatigue, anorexia, and weight loss for the past month.  Daughter Silvia at bedside,  offered and declined, patient preferred Daughter to translate. Patient has been visiting the [place] since early July.  Daughter reports generalized lethargy, fatigue, generalized weakness, lightheadedness, loss of appetite, and weight loss for the past month. Unsure of exact amount of weight loss, about ~15 lb weight loss, but visible loss of "belly size". Denies abdominal pain, nausea, vomiting, diarrhea. Reports some constipation since yesterday and small marble-like stool. Denies melena, hematochezia, hematuria, hemoptysis. Last colonoscopy was 10yrs ago which was unremarkable as per pt, pt did have an endoscopy "many years ago" which was also unremarkable. Denies FH colon cancer or gastric cancer. Per daughter patient is usually able to complete ADLs independently and walk around without assistance, but recently over the past month has required to hold onto walls for support while walking. Denies fever, chills, body aches, cp, sob, palpitations, urinary sx, headache, dizziness, bowel or bladder incontinence.   Earlier in the summer, pt was having back pain, was seen seen in a hospital in the [facility], was told that he had a compression fracture at that time. Pt does endorse pain in R subscapular area for over the past month, intermittent, dull, he has been taking Tylenol prn for pain.   Of note, pt was seen in a hospital at [facility] yesterday for evaluation of his weakness and was told that his hemoglobin was low at 8.7. He was recommended getting a  transfusion at that time which the pt and daughter both declined.  Pt and daughter flew back to [place] earlier today for further workup. Daughter states other lab work at the hospital was unremarkable, urine unremarkable,     ED COURSE:  Vitals: T 98.2, HR 86, /67, RR 16, SpO2 100% RA  Labs significant for: Hgb/Hct 8.9/26.8, BUN/Cr 64/5.70, K 3.8, Lipase 106  U/A: Moderate blood, Nitrite neg, Leuk Esterase Neg, occasional bacteria, squamous cells present  COVID neg  Imaging:  -CT head noncontrast: 1. No evidence of acute territorial infarction, hemorrhage or mass effect. 2. Findings most concordant with chronic microvascular ischemic change.   -CT Chest, Abd/Pelvis with IV contrast: Exophytic lesion arising from the gastric fundus with interval increase in size since CT 8/16/2021. This may represent a solid mass such as a GIST. Differential includes gastric diverticulum, which is considered less likely. Consider CT with oral contrast for further evaluation.   Age indeterminate compression fracture deformity involving the T8 vertebral body. Correlate for point tenderness.  EKG: Normal sinus rhythm rate 76, Normal ECG When compared with ECG of 07-JAN-2022 10:24, No significant change was found Confirmed by Chantal Metz ([id number]) on 9/17/2023 2:37:49 PM  Pt received:  1 L NS bolus x1, started on sodium chloride 0.45% at 75 cc/hr (17 Sep 2023 16:49)      ---  HOSPITAL COURSE: Pt. was admitted with Nephrology and GI consultation.  He was continued on IV hydration.  Urine culture was negative.  Pt. had cardiology preop evaluation and had EGD showing large sub mucosal lesion noted in the gastric cardia.  GE jn noted at 36cm from the incisors, clean based duodenal ulcer.  Pt. was given Protonix twice a day and recommended to have outpatient EUS and eventual outpatient Surg/onc evaluation.  SPEP was abnormal with Bence mendes protein,kappa,lambda light chains.  Pt. had 1 unit PRBC for anemia and had bone marrow biopsy on 9/21.  Renal function has improved during the hospitalization with IV hydration.  Skeletal survey showed Lucencies in the calvarium suspicious for lytic lesions.  Grade 1 spondylolisthesis of L5 on S1, unchanged. Age indeterminate compression fracture deformity involving the T8 vertebral body. Correlate for point tenderness. ? osteoporosis vs from MM. skeletal survey with calvarial lytic lesion.   WILY / MM / Bence Mendes protein - elevated light chains.    SIFE pending. Concern for MM vs light chain disease  Exophytic lesion arising from the gastric fundus may represent a solid mass such as a GIST.   Differential includes gastric diverticulum, which is considered less likely.   s/p  upper gastrointestinal endoscopy: large sub mucosal lesion noted in the gastric cardia. GE jn noted at 36cm from the incisors. Clean based duodenal ulcer. proton pump inhibitor orally bid. For anemia  IM B12. and PO Folate  patient is to follow up with Dr. Breonna Lombardi interventional GI at [facility] for EUS/Bx  PMD Dr Tramaine Barnes [[facility]]  [phone]

## 2023-09-19 NOTE — PROGRESS NOTE ADULT - ASSESSMENT
WILY, ? Baseline CKD 3: Prerenal azotemia, ARB Rx  Nephrotic range proteinuria: DDx membranous nephropathy  Anemia  Hypertension  Gastric mass    09/18/23: Continue IV hydration. Avoid nephrotoxic meds as possible. Avoid ACEI, ARB, NSAIDs. GI work up. Check serologies. May need kidney biopsy.   Monitor h/h trend. Transfuse PRBC's PRN. Check SPEP. Monitor BP trend. Titrate BP meds as needed.   Will follow electrolytes and renal function trend. D/w patients family at bedside.   09/19/23: s/p EGD. Will continue IVF. Labs pending. D/w pt's daughter at bedside.

## 2023-09-19 NOTE — PROGRESS NOTE ADULT - PROBLEM SELECTOR PLAN 4
CT chest with IV contrast: Age indeterminate compression fracture deformity involving the T8 vertebral body.   - Pt with no point tenderness on exam  - Tylenol PRN for pain  - Lidocaine patch   - PT consult
CT chest with IV contrast: Age indeterminate compression fracture deformity involving the T8 vertebral body.   - Pt with no point tenderness on exam  - Tylenol PRN for pain  - Lidocaine patch   - PT consult

## 2023-09-19 NOTE — PROGRESS NOTE ADULT - ASSESSMENT
72 y/o M with HTN and BPH presented with generalized fatigue, anorexia, and weight loss for the past month.  Was recently diagnosed with compression fracture and has not been able to do his ADL's.  Was recently back from  with a dx of anemia and advised to get a transfusion but patient and daughter refused.  He is back to [place] for further w/u.  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  Labs revealed WILY    Cardiac Optimization/HTN  - CT abdomen/pelvis showed a lesion arising from the gastric fundus suspicious of GIST  - GI following. S/p EGD large sub mucosal lesion noted in the gastric cardia, GE jn noted at 36cm from the incisors  clean based duodenal ulcer  - Tolerated procedure well, cardiac status optimal post operatively     - Had a normal Stress test in 2/2022 in our office and had a reportedly normal TTE in the [place] last year with his PCP  - EKG showed NSR with Q waves in leads ll and lll, unchanged from previous.  No anginal complaints  - No evidence of any active ischemia     - Holding Lisinopril/HCTZ  in the setting of WILY.  - Creatinine:  <--4.70,  <--4.40,  <--5.00,  <--5.70  - BP stable and controlled   - Follow renal recommendations.   - Okay to give IV hydration    - No evidence of any meaningful volume overload  - No evidence of significant arrhythmia     - Monitor and replete lytes, keep K>4, Mg>2.  - Will continue to follow.    Nu Brooks, MS FNP, AGACNP  Nurse Practitioner- Cardiology   Please call on TEAMS

## 2023-09-19 NOTE — PROGRESS NOTE ADULT - ASSESSMENT
gastric mass/GIST  weight loss/malnutrition     s/p  upper gastrointestinal endoscopy:    large sub mucosal lesion noted in the gastric cardia  GE jn noted at 36cm from the incisors  clean based duodenal ulcer    rec:   reg diet   proton pump inhibitor orally bid  patient is to follow up with Dr. Breonna Lombardi interventional GI at [facility] for EUS/Bx  may need a trial of Remeron to boost appetite  d/w dtr

## 2023-09-19 NOTE — PROGRESS NOTE ADULT - PROBLEM SELECTOR PLAN 3
- BUN/Cr 64/5.70 on admission, BUN/Cr 21/1.1 in Jan 2022, jan 2023 GFR 63  - likely prerenal given decrease PO intake over the last month, no signs of hydronephrosis on CT, no urinary sx   - Pt was given IV contrast in the ED for CT scan  - U/A: Moderate blood, Nitrite neg, Leuk Esterase Neg, occasional bacteria, squamous cells present, so s/s bleeding, f/u UCx   - Hold home lisinopril-HCTZ   - Avoid nephrotoxic medications  - Monitor daily CMP   nephro consulted  -checking bladder scan
- BUN/Cr 64/5.70 on admission, BUN/Cr 21/1.1 in Jan 2022, jan 2023 GFR 63  - likely prerenal given decrease PO intake over the last month, no signs of hydronephrosis on CT, no urinary sx   - Pt was given IV contrast in the ED for CT scan  - U/A: Moderate blood, Nitrite neg, Leuk Esterase Neg, occasional bacteria, squamous cells present, so s/s bleeding, f/u UCx   - Hold home lisinopril-HCTZ   - Avoid nephrotoxic medications  - Monitor daily CMP   nephro consulted  -checking bladder scan

## 2023-09-19 NOTE — PROGRESS NOTE ADULT - PROBLEM SELECTOR PLAN 7
CT A/P: Small hypoattenuating lesion at the gallbladder fossa, incompletely characterized. Hypoattenuating left renal lesions are stable compared with the prior CT 8/16/2021, likely benign cysts  - F/u out patient upon discharge
CT A/P: Small hypoattenuating lesion at the gallbladder fossa, incompletely characterized. Hypoattenuating left renal lesions are stable compared with the prior CT 8/16/2021, likely benign cysts  - F/u out patient upon discharge

## 2023-09-19 NOTE — DISCHARGE NOTE PROVIDER - NSDCFUADDAPPT_GEN_ALL_CORE_FT
Please call and make an appointment with your primary medical doctor and the gastroenterologist for outpatient follow up of the stomach mass.

## 2023-09-19 NOTE — DISCHARGE NOTE PROVIDER - CARE PROVIDER_API CALL
Reyes Griffin  Gastroenterology  [address] [place] [address]  Phone: [phone]  Fax: [phone]  Follow Up Time:     Den Sweet  Nephrology  [address], [address]  [place] [address]  Phone: [phone]  Fax: [phone]  Follow Up Time:    Reyes Griffin  Gastroenterology  [address] [place] [address]  Phone: [phone]  Fax: [phone]  Follow Up Time:     Den Sweet  Nephrology  [address], [address]  [place] [address]  Phone: [phone]  Fax: [phone]  Follow Up Time:     Breonna Lombardi  Gastroenterology  [address], [address] [place] [address]  Phone: [phone]  Fax: [phone]  Follow Up Time:    Reyes Griffin  Gastroenterology  [address] [place] [address]  Phone: [phone]  Fax: [phone]  Follow Up Time:     Den Sweet  Nephrology  [address], [address]  [place] [address]  Phone: [phone]  Fax: [phone]  Follow Up Time:     Breonna Lombardi  Gastroenterology  [address], [address] [place] [address]  Phone: [phone]  Fax: [phone]  Follow Up Time:     Hon Migel Peacock  Medical Oncology  [address], [address]  [place] [address]  Phone: [phone]  Fax: [phone]  Follow Up Time:

## 2023-09-19 NOTE — DISCHARGE NOTE PROVIDER - PROVIDER TOKENS
PROVIDER:[TOKEN:[[id number]]],PROVIDER:[TOKEN:[[id number]]] PROVIDER:[TOKEN:[[id number]]],PROVIDER:[TOKEN:[[id number]]],PROVIDER:[TOKEN:[[id number]]] PROVIDER:[TOKEN:[[id number]]],PROVIDER:[TOKEN:[[id number]]],PROVIDER:[TOKEN:[[id number]]],PROVIDER:[TOKEN:[[id number]]]

## 2023-09-19 NOTE — PROGRESS NOTE ADULT - PROBLEM SELECTOR PLAN 2
Hgb/Hct 8.9/26.8 on admission, H/H 14.5/41.7 in Jan 2022, JAN 2023 12   - anemia likely in the setting of  malignancy   - No s/s bleeding at this time   - Heme/onc Dr. Toth consulted  - Plan as above
Hgb/Hct 8.9/26.8 on admission, H/H 14.5/41.7 in Jan 2022, JAN 2023 12   - anemia likely in the setting of  malignancy   - No s/s bleeding at this time   - maintain active type and screen q72hr  - F/u CBC and transfuse if Hgb<7 or s/s bleeding   - Heme/onc Dr. Toth consulted, f/u recs  - Plan as above

## 2023-09-19 NOTE — DISCHARGE NOTE PROVIDER - NSDCMRMEDTOKEN_GEN_ALL_CORE_FT
amLODIPine 10 mg oral tablet: 1 orally once a day  Flomax 0.4 mg oral capsule: 1 cap(s) orally once a day   lisinopril-hydrochlorothiazide 10 mg-12.5 mg oral tablet: 1 orally once a day   amLODIPine 10 mg oral tablet: 1 orally once a day  Flomax 0.4 mg oral capsule: 1 cap(s) orally once a day   lidocaine 4% topical film: Apply topically to affected area once a day  lisinopril-hydrochlorothiazide 10 mg-12.5 mg oral tablet: 1 orally once a day   amLODIPine 10 mg oral tablet: 1 orally once a day  B-12 5000 mcg oral tablet, extended release: 1 tab(s) orally once a day  Flomax 0.4 mg oral capsule: 1 cap(s) orally once a day   folic acid 1 mg oral tablet: 1 tab(s) orally once a day  lidocaine 4% topical film: Apply topically to affected area once a day  lisinopril-hydrochlorothiazide 10 mg-12.5 mg oral tablet: 1 orally once a day  pantoprazole 40 mg oral delayed release tablet: 1 tab(s) orally every 12 hours  polyethylene glycol 3350 oral powder for reconstitution: 17 gram(s) orally once a day as needed for  constipation

## 2023-09-19 NOTE — PHYSICAL THERAPY INITIAL EVALUATION ADULT - PERTINENT HX OF CURRENT PROBLEM, REHAB EVAL
73-year-old male with history of hypertension and BPH presents with generalized fatigue, anorexia, and weight loss for the past month.  Patient has been visiting the [place] since early July.  Daughter reports generalized lethargy, fatigue, generalized weakness, no appetite, and weight loss the past month.  Daughter reports about 15 to 20 pound weight loss the past month.  Patient denies any headache, chest pain, shortness of breath, abdominal pain, nausea, vomiting, diarrhea.  Denies any black or bloody stools.  Also reports some generalized lightheadedness the past month.  Denies any spinning of room sensation.  Will occasionally be unsteady on his feet due to lightheadedness.  Pt was diagnosed with compression fracture.  Denies any specific injury or trauma.  Denies any increase in back pain.  Was seen in hospital at [facility].  Was told hemoglobin 8.7 and recommended transfusion at that time.  Daughter did not want patient transfused and [place] so brought him back to [place].

## 2023-09-19 NOTE — CONSULT NOTE ADULT - REASON FOR ADMISSION
anemia, gastric mass concerning for malignancy, casie

## 2023-09-19 NOTE — CONSULT NOTE ADULT - ASSESSMENT
[ASSESSMENT and  PLAN]    C49.A         Gastrointestinal stromal tumor  S22. 060A   Wedge compression fracture of T7-T8 vertebra, initial encounter for closed fracture  D63.8        Anemia chronic disease    Exophytic lesion arising from the gastric fundus may represent a solid mass such as a GIST.   Differential includes gastric diverticulum, which is considered less likely.   weight loss/malnutrition     s/p  upper gastrointestinal endoscopy:  large sub mucosal lesion noted in the gastric cardia  GE jn noted at 36cm from the incisors  clean based duodenal ulcer    patient is to follow up with Dr. Breonna Lombardi interventional GI at [facility] for EUS/Bx    Age indeterminate compression fracture deformity involving the T8 vertebral body. Correlate for point tenderness.    Anemia chronic disease  Possible fe def    RECOMMENDATIONS    GIST  Reg diet   proton pump inhibitor orally bid  outpt EGD with EUS planned.   Surgery followup post     Follow CBC  no indication for transfusion at current time.   Transfuse PRBC as clinically indicated.   Transfuse PRBC if Hgb <7.0 or if symptomatic.     Check Anemia studies.      Ferritin, Iron studies     B12, Folate     ESR, CRP    DVT Prophylaxis.   SQ Lovenox or SQ heparin.     Discussed plan of care with patient and in detail.   Pt/Family expressed understanding of the treatment plan.   Risks, benefits and alternatives discussed in detail.   Opportunity given for questions and discussion.   Questions or concerns all addressed and answered to their satisfaction, and in lay terms.

## 2023-09-20 LAB
% GAMMA, URINE: 82 % — SIGNIFICANT CHANGE UP
ALBUMIN 24H MFR UR ELPH: 2 % — SIGNIFICANT CHANGE UP
ALBUMIN SERPL ELPH-MCNC: 3 G/DL — LOW (ref 3.3–5)
ALP SERPL-CCNC: 56 U/L — SIGNIFICANT CHANGE UP (ref 40–120)
ALPHA1 GLOB 24H MFR UR ELPH: 3.4 % — SIGNIFICANT CHANGE UP
ALPHA2 GLOB 24H MFR UR ELPH: 3 % — SIGNIFICANT CHANGE UP
ALT FLD-CCNC: 23 U/L — SIGNIFICANT CHANGE UP (ref 12–78)
ANA TITR SER: NEGATIVE — SIGNIFICANT CHANGE UP
ANION GAP SERPL CALC-SCNC: 6 MMOL/L — SIGNIFICANT CHANGE UP (ref 5–17)
AST SERPL-CCNC: 14 U/L — LOW (ref 15–37)
AUTO DIFF PNL BLD: NEGATIVE — SIGNIFICANT CHANGE UP
B-GLOBULIN 24H MFR UR ELPH: 9.6 % — SIGNIFICANT CHANGE UP
BASOPHILS # BLD AUTO: 0.02 K/UL — SIGNIFICANT CHANGE UP (ref 0–0.2)
BASOPHILS NFR BLD AUTO: 0.4 % — SIGNIFICANT CHANGE UP (ref 0–2)
BILIRUB SERPL-MCNC: 0.3 MG/DL — SIGNIFICANT CHANGE UP (ref 0.2–1.2)
BUN SERPL-MCNC: 46 MG/DL — HIGH (ref 7–23)
C-ANCA SER-ACNC: NEGATIVE — SIGNIFICANT CHANGE UP
CALCIUM SERPL-MCNC: 9.9 MG/DL — SIGNIFICANT CHANGE UP (ref 8.5–10.1)
CHLORIDE SERPL-SCNC: 111 MMOL/L — HIGH (ref 96–108)
CO2 SERPL-SCNC: 25 MMOL/L — SIGNIFICANT CHANGE UP (ref 22–31)
COLLECT DURATION TIME UR: 24 HR — SIGNIFICANT CHANGE UP
CREAT SERPL-MCNC: 4.3 MG/DL — HIGH (ref 0.5–1.3)
CRP SERPL-MCNC: 7 MG/L — HIGH
DSDNA AB SER-ACNC: <12 IU/ML — SIGNIFICANT CHANGE UP
EGFR: 14 ML/MIN/1.73M2 — LOW
EOSINOPHIL # BLD AUTO: 0.08 K/UL — SIGNIFICANT CHANGE UP (ref 0–0.5)
EOSINOPHIL NFR BLD AUTO: 1.7 % — SIGNIFICANT CHANGE UP (ref 0–6)
ERYTHROCYTE [SEDIMENTATION RATE] IN BLOOD: 63 MM/HR — HIGH (ref 0–20)
GLUCOSE SERPL-MCNC: 100 MG/DL — HIGH (ref 70–99)
HCT VFR BLD CALC: 23.6 % — LOW (ref 39–50)
HGB BLD-MCNC: 7.8 G/DL — LOW (ref 13–17)
IMM GRANULOCYTES NFR BLD AUTO: 1.1 % — HIGH (ref 0–0.9)
INTERPRETATION 24H UR IFE-IMP: SIGNIFICANT CHANGE UP
LYMPHOCYTES # BLD AUTO: 2.37 K/UL — SIGNIFICANT CHANGE UP (ref 1–3.3)
LYMPHOCYTES # BLD AUTO: 50.2 % — HIGH (ref 13–44)
M PROTEIN 24H UR ELPH-MRATE: 248.5 MG/DL — SIGNIFICANT CHANGE UP
M PROTEIN 24H UR ELPH-MRATE: 4100.25 MG/24HR — HIGH (ref 0–0)
M PROTEIN 24H UR ELPH-MRATE: 77.4 % — SIGNIFICANT CHANGE UP
MCHC RBC-ENTMCNC: 29.1 PG — SIGNIFICANT CHANGE UP (ref 27–34)
MCHC RBC-ENTMCNC: 33.1 GM/DL — SIGNIFICANT CHANGE UP (ref 32–36)
MCV RBC AUTO: 88.1 FL — SIGNIFICANT CHANGE UP (ref 80–100)
MONOCYTES # BLD AUTO: 0.27 K/UL — SIGNIFICANT CHANGE UP (ref 0–0.9)
MONOCYTES NFR BLD AUTO: 5.7 % — SIGNIFICANT CHANGE UP (ref 2–14)
MPO AB + PR3 PNL SER: SIGNIFICANT CHANGE UP
MYELOPEROXIDASE AB SER-ACNC: <5 UNITS — SIGNIFICANT CHANGE UP
MYELOPEROXIDASE CELLS FLD QL: NEGATIVE — SIGNIFICANT CHANGE UP
NEUTROPHILS # BLD AUTO: 1.93 K/UL — SIGNIFICANT CHANGE UP (ref 1.8–7.4)
NEUTROPHILS NFR BLD AUTO: 40.9 % — LOW (ref 43–77)
NRBC # BLD: 0 /100 WBCS — SIGNIFICANT CHANGE UP (ref 0–0)
P-ANCA SER-ACNC: NEGATIVE — SIGNIFICANT CHANGE UP
PLATELET # BLD AUTO: 176 K/UL — SIGNIFICANT CHANGE UP (ref 150–400)
POTASSIUM SERPL-MCNC: 4.4 MMOL/L — SIGNIFICANT CHANGE UP (ref 3.5–5.3)
POTASSIUM SERPL-SCNC: 4.4 MMOL/L — SIGNIFICANT CHANGE UP (ref 3.5–5.3)
PROT PATTERN 24H UR ELPH-IMP: SIGNIFICANT CHANGE UP
PROT SERPL-MCNC: 6.3 G/DL — SIGNIFICANT CHANGE UP (ref 6–8.3)
PROTEIN QUANT CALC, URINE: 5296 MG/24 H — HIGH (ref 50–100)
PROTEINASE3 AB FLD-ACNC: <5 UNITS — SIGNIFICANT CHANGE UP
PROTEINASE3 AB SER-ACNC: NEGATIVE — SIGNIFICANT CHANGE UP
RBC # BLD: 2.68 M/UL — LOW (ref 4.2–5.8)
RBC # FLD: 12.5 % — SIGNIFICANT CHANGE UP (ref 10.3–14.5)
SODIUM SERPL-SCNC: 142 MMOL/L — SIGNIFICANT CHANGE UP (ref 135–145)
TOTAL VOLUME - 24 HOUR: 1650 ML — SIGNIFICANT CHANGE UP
URINE CREATININE CALCULATION: 0.9 G/24 H — LOW (ref 1–2)
WBC # BLD: 4.72 K/UL — SIGNIFICANT CHANGE UP (ref 3.8–10.5)
WBC # FLD AUTO: 4.72 K/UL — SIGNIFICANT CHANGE UP (ref 3.8–10.5)

## 2023-09-20 PROCEDURE — 99233 SBSQ HOSP IP/OBS HIGH 50: CPT

## 2023-09-20 PROCEDURE — 99232 SBSQ HOSP IP/OBS MODERATE 35: CPT

## 2023-09-20 RX ORDER — PREGABALIN 225 MG/1
1000 CAPSULE ORAL DAILY
Refills: 0 | Status: DISCONTINUED | OUTPATIENT
Start: 2023-09-20 | End: 2023-09-20

## 2023-09-20 RX ORDER — PREGABALIN 225 MG/1
1000 CAPSULE ORAL DAILY
Refills: 0 | Status: DISCONTINUED | OUTPATIENT
Start: 2023-09-20 | End: 2023-09-26

## 2023-09-20 RX ORDER — FOLIC ACID 0.8 MG
1 TABLET ORAL DAILY
Refills: 0 | Status: DISCONTINUED | OUTPATIENT
Start: 2023-09-20 | End: 2023-09-26

## 2023-09-20 RX ADMIN — PANTOPRAZOLE SODIUM 40 MILLIGRAM(S): 20 TABLET, DELAYED RELEASE ORAL at 17:35

## 2023-09-20 RX ADMIN — SODIUM CHLORIDE 75 MILLILITER(S): 9 INJECTION, SOLUTION INTRAVENOUS at 14:19

## 2023-09-20 RX ADMIN — POLYETHYLENE GLYCOL 3350 17 GRAM(S): 17 POWDER, FOR SOLUTION ORAL at 14:03

## 2023-09-20 RX ADMIN — LIDOCAINE 1 PATCH: 4 CREAM TOPICAL at 06:13

## 2023-09-20 RX ADMIN — Medication 100 MILLIGRAM(S): at 14:04

## 2023-09-20 RX ADMIN — PANTOPRAZOLE SODIUM 40 MILLIGRAM(S): 20 TABLET, DELAYED RELEASE ORAL at 05:49

## 2023-09-20 RX ADMIN — HEPARIN SODIUM 5000 UNIT(S): 5000 INJECTION INTRAVENOUS; SUBCUTANEOUS at 05:49

## 2023-09-20 RX ADMIN — LIDOCAINE 1 PATCH: 4 CREAM TOPICAL at 17:35

## 2023-09-20 RX ADMIN — LIDOCAINE 1 PATCH: 4 CREAM TOPICAL at 19:14

## 2023-09-20 RX ADMIN — TAMSULOSIN HYDROCHLORIDE 0.4 MILLIGRAM(S): 0.4 CAPSULE ORAL at 21:02

## 2023-09-20 RX ADMIN — PREGABALIN 1000 MICROGRAM(S): 225 CAPSULE ORAL at 17:35

## 2023-09-20 NOTE — PROGRESS NOTE ADULT - ASSESSMENT
WILY, ? Baseline CKD 3: Prerenal azotemia, ARB Rx  Nephrotic range proteinuria: DDx membranous nephropathy  Anemia  Hypertension  Gastric mass    09/18/23: Continue IV hydration. Avoid nephrotoxic meds as possible. Avoid ACEI, ARB, NSAIDs. GI work up. Check serologies. May need kidney biopsy.   Monitor h/h trend. Transfuse PRBC's PRN. Check SPEP. Monitor BP trend. Titrate BP meds as needed.   Will follow electrolytes and renal function trend. D/w patients family at bedside.   09/19/23: s/p EGD. Will continue IVF. Labs pending. D/w pt's daughter at bedside.   09/20/23: Improving creatinine trend. Strict I & O. Serologies pending. Will decide on kidney biopsy. Recheck UA, Urine p/c. Monitor h/h trend. Transfuse PRBC's PRN.   D/w pt's daughter at bedside.  WILY, ? Baseline CKD 3: Prerenal azotemia, ARB Rx  Nephrotic range proteinuria: DDx membranous nephropathy  Anemia, Abnormal SPEP  Hypertension  Gastric mass    09/18/23: Continue IV hydration. Avoid nephrotoxic meds as possible. Avoid ACEI, ARB, NSAIDs. GI work up. Check serologies. May need kidney biopsy.   Monitor h/h trend. Transfuse PRBC's PRN. Check SPEP. Monitor BP trend. Titrate BP meds as needed.   Will follow electrolytes and renal function trend. D/w patients family at bedside.   09/19/23: s/p EGD. Will continue IVF. Labs pending. D/w pt's daughter at bedside.   09/20/23: Improving creatinine trend. Strict I & O. Serologies pending. Abnormal SPEP. Hematology follow up.   Monitor h/h trend. Transfuse PRBC's PRN. D/w pt's daughter at bedside.

## 2023-09-20 NOTE — PROGRESS NOTE ADULT - ASSESSMENT
74 y/o M with HTN and BPH presented with generalized fatigue, anorexia, and weight loss for the past month.  Was recently diagnosed with compression fracture and has not been able to do his ADL's.  Was recently back from  with a dx of anemia and advised to get a transfusion but patient and daughter refused.  He is back to [place] for further w/u.  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  Labs revealed WILY    Cardiac Optimization/HTN  - CT abdomen/pelvis showed a lesion arising from the gastric fundus suspicious of GIST  - GI following. S/p EGD large sub mucosal lesion noted in the gastric cardia, GE jn noted at 36cm from the incisors  clean based duodenal ulcer  - Tolerated procedure well, cardiac status optimal post operatively     - Had a normal Stress test in 2/2022 in our office and had a reportedly normal TTE in the [place] last year with his PCP  - EKG showed NSR with Q waves in leads ll and lll, unchanged from previous.  No anginal complaints  - No evidence of any active ischemia     - Holding Lisinopril/HCTZ  in the setting of WILY.  - Creatinine:  <--4.30,  <--4.70,  <--4.40,  <--5.00,  <--5.70  - BP stable and controlled   - Follow renal recommendations.   - Okay to give IV hydration  - No evidence of any meaningful volume overload  - No evidence of significant arrhythmia     - Monitor and replete lytes, keep K>4, Mg>2.  - Will continue to follow.    Nu Brooks, MS FNP, AGACNP  Nurse Practitioner- Cardiology   Please call on TEAMS

## 2023-09-20 NOTE — PROGRESS NOTE ADULT - ASSESSMENT
gastric mass/GIST  weight loss/malnutrition     s/p  upper gastrointestinal endoscopy:    large sub mucosal lesion noted in the gastric cardia  GE jn noted at 36cm from the incisors  clean based duodenal ulcer    rec:   reg diet   proton pump inhibitor orally bid  patient is to follow up with Dr. Breonna Lombardi interventional GI at [facility] for EUS/Bx  may need a trial of Remeron to boost appetite

## 2023-09-20 NOTE — PROGRESS NOTE ADULT - ASSESSMENT
74 yo male with hx of HTN of HTN and BPH, presents with fatigue, anorexia, and weight loss x 1 month, admitted for new gastric mass, anemia and WILY.     Problem/Plan - 1:  ·  Problem: Gastric tumor.   ·  Plan: Pt presents with fatigue, weakness, weight loss, decreased appetite x1 month   - CT Chest, Abd/Pelvis with IV contrast: Exophytic lesion arising from the gastric fundus measuring 4.1 x 5.3 x 5.2 cm, increased in size from CT 8/16/2021 where it measured 3.0 x 3.4 x 3.3 cm.. This may represent a solid mass such as a GIST. Differential includes gastric diverticulum, which is considered less likely.   - lipase: 106  - Nutrition consult   - Pt with new anemia, worsening kidney function, compression fx, f/u SPEP, UPEP to r/o multiple myeloma though low on differential   - GI Dr. Hua consulted  - Heme/onc Dr. Toth consulted  Patient s/p EGD ulcer found with lesion  Plan is for OUTPATIENT EUS and eventual outpatient Surg/onc  at this time monitor hb (BASELINE of 12--> now 8.2) with borderline "OK" b12  protonix bid, carafate, cyanocobalamin 1000mcg PO daily--> maintain hb > 8.     Problem/Plan - 2:  ·  Problem: Anemia.   ·  Plan: Hgb/Hct 8.9/26.8 on admission, H/H 14.5/41.7 in Jan 2022, JAN 2023 12   - anemia likely in the setting of  malignancy  and wily  - No s/s bleeding at this time   - Heme/onc Dr. Toth consulted  - Supportive transfusion PRN     Problem/Plan - 3:  ·  Problem: WILY (acute kidney injury).   ·  Plan: - BUN/Cr 64/5.70 on admission, BUN/Cr 21/1.1 in Jan 2022, jan 2023 GFR 63  - likely prerenal given decrease PO intake over the last month, no signs of hydronephrosis on CT, no urinary sx   - Pt was given IV contrast in the ED for CT scan  - U/A: Moderate blood, Nitrite neg, Leuk Esterase Neg, occasional bacteria, squamous cells present, so s/s bleeding, f/u UCx   - Hold home lisinopril-HCTZ   - Avoid nephrotoxic medications  - Monitor daily CMP   - nephro consulted.  Pt. to have kidney biopsy with IR tomorrow.         Problem/Plan - 4:  ·  Problem: Compression fracture of spine.   ·  Plan: CT chest with IV contrast: Age indeterminate compression fracture deformity involving the T8 vertebral body.   - Pt with no point tenderness on exam  - Tylenol PRN for pain  - Lidocaine patch   - PT consult.     Problem/Plan - 5:  ·  Problem: HTN (hypertension).   ·  Plan: Chronic   - Stable  - Holding home amlodipine 10mg QD with hold parameters   - Will HOLD home lisinopril-HCTZ 10-12.5mg in the setting of WILY, may restart once kidney function improves   - Pt. remains normotensive off antihypertensive medications.      Problem/Plan - 6:  ·  Problem: BPH (benign prostatic hyperplasia).   ·  Plan: Chronic   - Continue home tamsulosin 0.4mg QD.     Problem/Plan - 7:  ·  Problem: Abnormal finding on imaging.   ·  Plan: CT A/P: Small hypoattenuating lesion at the gallbladder fossa, incompletely characterized. Hypoattenuating left renal lesions are stable compared with the prior CT 8/16/2021, likely benign cysts  - F/u out patient upon discharge.     Problem/Plan - 8:  ·  Problem: Need for prophylactic measure.   ·  Plan: DVT PPX: heparin 5000U q12 g    #Dispo  - PT consult.       74 yo male with hx of HTN of HTN and BPH, presents with fatigue, anorexia, and weight loss x 1 month, admitted for new gastric mass, anemia and WILY.     Problem/Plan - 1:  ·  Problem: Gastric tumor.   ·  Plan: Pt presents with fatigue, weakness, weight loss, decreased appetite x1 month   - CT Chest, Abd/Pelvis with IV contrast: Exophytic lesion arising from the gastric fundus measuring 4.1 x 5.3 x 5.2 cm, increased in size from CT 8/16/2021 where it measured 3.0 x 3.4 x 3.3 cm.. This may represent a solid mass such as a GIST. Differential includes gastric diverticulum, which is considered less likely.   - lipase: 106  - Nutrition consult   - Pt with new anemia, worsening kidney function, compression fx, Abnormal SPEP.  Pt. scheduled for bone marrow biopsy tomorrow.    - GI Dr. Hua consulted  - Heme/onc Dr. Toth consulted  Patient s/p EGD ulcer found with lesion  Plan is for OUTPATIENT EUS and eventual outpatient Surg/onc  at this time monitor hb (BASELINE of 12--> now 8.2) with borderline "OK" b12  protonix bid, carafate, cyanocobalamin 1000mcg PO daily--> maintain hb > 8.     Problem/Plan - 2:  ·  Problem: Anemia.   ·  Plan: Hgb/Hct 8.9/26.8 on admission, H/H 14.5/41.7 in Jan 2022, JAN 2023 12   - anemia likely in the setting of  malignancy  and wily  - No s/s bleeding at this time   - Heme/onc Dr. Toth consulted  - Supportive transfusion PRN     Problem/Plan - 3:  ·  Problem: WILY (acute kidney injury).   ·  Plan: - BUN/Cr 64/5.70 on admission, BUN/Cr 21/1.1 in Jan 2022, jan 2023 GFR 63  - likely prerenal given decrease PO intake over the last month, no signs of hydronephrosis on CT, no urinary sx   - Pt was given IV contrast in the ED for CT scan  - U/A: Moderate blood, Nitrite neg, Leuk Esterase Neg, occasional bacteria, squamous cells present, so s/s bleeding, f/u UCx   - Hold home lisinopril-HCTZ   - Avoid nephrotoxic medications  - Monitor daily CMP   - nephro consulted.        Problem/Plan - 4:  ·  Problem: Compression fracture of spine.   ·  Plan: CT chest with IV contrast: Age indeterminate compression fracture deformity involving the T8 vertebral body.   - Pt with no point tenderness on exam  - Tylenol PRN for pain  - Lidocaine patch   - PT consult.     Problem/Plan - 5:  ·  Problem: HTN (hypertension).   ·  Plan: Chronic   - Stable  - Holding home amlodipine 10mg QD with hold parameters   - Will HOLD home lisinopril-HCTZ 10-12.5mg in the setting of WILY, may restart once kidney function improves   - Pt. remains normotensive off antihypertensive medications.      Problem/Plan - 6:  ·  Problem: BPH (benign prostatic hyperplasia).   ·  Plan: Chronic   - Continue home tamsulosin 0.4mg QD.     Problem/Plan - 7:  ·  Problem: Abnormal finding on imaging.   ·  Plan: CT A/P: Small hypoattenuating lesion at the gallbladder fossa, incompletely characterized. Hypoattenuating left renal lesions are stable compared with the prior CT 8/16/2021, likely benign cysts  - F/u out patient upon discharge.     Problem/Plan - 8:  ·  Problem: Need for prophylactic measure.   ·  Plan: DVT PPX: heparin 5000U q12 g    #Dispo  - PT consult.

## 2023-09-21 LAB
ANION GAP SERPL CALC-SCNC: 5 MMOL/L — SIGNIFICANT CHANGE UP (ref 5–17)
BASOPHILS # BLD AUTO: 0.02 K/UL — SIGNIFICANT CHANGE UP (ref 0–0.2)
BASOPHILS NFR BLD AUTO: 0.4 % — SIGNIFICANT CHANGE UP (ref 0–2)
BUN SERPL-MCNC: 42 MG/DL — HIGH (ref 7–23)
CALCIUM SERPL-MCNC: 9.8 MG/DL — SIGNIFICANT CHANGE UP (ref 8.5–10.1)
CHLORIDE SERPL-SCNC: 110 MMOL/L — HIGH (ref 96–108)
CO2 SERPL-SCNC: 27 MMOL/L — SIGNIFICANT CHANGE UP (ref 22–31)
CREAT SERPL-MCNC: 3.8 MG/DL — HIGH (ref 0.5–1.3)
DEPRECATED KAPPA LC FREE/LAMBDA SER: 2049.56 RATIO — HIGH (ref 0.7–6.02)
EGFR: 16 ML/MIN/1.73M2 — LOW
EOSINOPHIL # BLD AUTO: 0.09 K/UL — SIGNIFICANT CHANGE UP (ref 0–0.5)
EOSINOPHIL NFR BLD AUTO: 2 % — SIGNIFICANT CHANGE UP (ref 0–6)
GBM IGG SER-ACNC: <0.2 — SIGNIFICANT CHANGE UP (ref 0–0.9)
GLUCOSE SERPL-MCNC: 97 MG/DL — SIGNIFICANT CHANGE UP (ref 70–99)
HBV CORE IGM SER-ACNC: SIGNIFICANT CHANGE UP
HBV SURFACE AB SER-ACNC: REACTIVE
HBV SURFACE AG SER-ACNC: SIGNIFICANT CHANGE UP
HCT VFR BLD CALC: 22.7 % — LOW (ref 39–50)
HCV AB S/CO SERPL IA: 0.03 S/CO — SIGNIFICANT CHANGE UP (ref 0–0.99)
HCV AB SERPL-IMP: SIGNIFICANT CHANGE UP
HGB BLD-MCNC: 7.6 G/DL — LOW (ref 13–17)
IGA FLD-MCNC: 22 MG/DL — LOW (ref 84–499)
IGG FLD-MCNC: 446 MG/DL — LOW (ref 610–1660)
IGM SERPL-MCNC: 11 MG/DL — LOW (ref 35–242)
IMM GRANULOCYTES NFR BLD AUTO: 1.3 % — HIGH (ref 0–0.9)
KAPPA LC SER QL IFE: 1291.68 MG/DL — HIGH (ref 0.33–1.94)
KAPPA LC UR-MCNC: HIGH MG/L
KAPPA/LAMBDA FREE LIGHT CHAIN RATIO, SERUM: 3075.43 RATIO — HIGH (ref 0.26–1.65)
LAMBDA LC SER QL IFE: 0.42 MG/DL — LOW (ref 0.57–2.63)
LAMBDA LC UR-MCNC: 7.54 MG/L — HIGH
LYMPHOCYTES # BLD AUTO: 1.99 K/UL — SIGNIFICANT CHANGE UP (ref 1–3.3)
LYMPHOCYTES # BLD AUTO: 44 % — SIGNIFICANT CHANGE UP (ref 13–44)
MAGNESIUM SERPL-MCNC: 1.3 MG/DL — LOW (ref 1.6–2.6)
MCHC RBC-ENTMCNC: 29.6 PG — SIGNIFICANT CHANGE UP (ref 27–34)
MCHC RBC-ENTMCNC: 33.5 GM/DL — SIGNIFICANT CHANGE UP (ref 32–36)
MCV RBC AUTO: 88.3 FL — SIGNIFICANT CHANGE UP (ref 80–100)
MONOCYTES # BLD AUTO: 0.26 K/UL — SIGNIFICANT CHANGE UP (ref 0–0.9)
MONOCYTES NFR BLD AUTO: 5.8 % — SIGNIFICANT CHANGE UP (ref 2–14)
NEUTROPHILS # BLD AUTO: 2.1 K/UL — SIGNIFICANT CHANGE UP (ref 1.8–7.4)
NEUTROPHILS NFR BLD AUTO: 46.5 % — SIGNIFICANT CHANGE UP (ref 43–77)
NRBC # BLD: 0 /100 WBCS — SIGNIFICANT CHANGE UP (ref 0–0)
PHOSPHATE SERPL-MCNC: 4 MG/DL — SIGNIFICANT CHANGE UP (ref 2.5–4.5)
PLATELET # BLD AUTO: 157 K/UL — SIGNIFICANT CHANGE UP (ref 150–400)
POTASSIUM SERPL-MCNC: 4.4 MMOL/L — SIGNIFICANT CHANGE UP (ref 3.5–5.3)
POTASSIUM SERPL-SCNC: 4.4 MMOL/L — SIGNIFICANT CHANGE UP (ref 3.5–5.3)
PROT SERPL-MCNC: 5.9 G/DL — LOW (ref 6–8.3)
PROT SERPL-MCNC: 5.9 G/DL — LOW (ref 6–8.3)
RBC # BLD: 2.57 M/UL — LOW (ref 4.2–5.8)
RBC # FLD: 12.5 % — SIGNIFICANT CHANGE UP (ref 10.3–14.5)
SODIUM SERPL-SCNC: 142 MMOL/L — SIGNIFICANT CHANGE UP (ref 135–145)
WBC # BLD: 4.52 K/UL — SIGNIFICANT CHANGE UP (ref 3.8–10.5)
WBC # FLD AUTO: 4.52 K/UL — SIGNIFICANT CHANGE UP (ref 3.8–10.5)

## 2023-09-21 PROCEDURE — 77012 CT SCAN FOR NEEDLE BIOPSY: CPT | Mod: 26

## 2023-09-21 PROCEDURE — 77074 RADEX OSSEOUS SURVEY LMTD: CPT | Mod: 26

## 2023-09-21 PROCEDURE — 88291 CYTO/MOLECULAR REPORT: CPT

## 2023-09-21 PROCEDURE — 99232 SBSQ HOSP IP/OBS MODERATE 35: CPT

## 2023-09-21 PROCEDURE — 88189 FLOWCYTOMETRY/READ 16 & >: CPT

## 2023-09-21 PROCEDURE — 85097 BONE MARROW INTERPRETATION: CPT

## 2023-09-21 PROCEDURE — 88364 INSITU HYBRIDIZATION (FISH): CPT | Mod: 26

## 2023-09-21 PROCEDURE — 88312 SPECIAL STAINS GROUP 1: CPT | Mod: 26

## 2023-09-21 PROCEDURE — 88313 SPECIAL STAINS GROUP 2: CPT | Mod: 26

## 2023-09-21 PROCEDURE — 88305 TISSUE EXAM BY PATHOLOGIST: CPT | Mod: 26

## 2023-09-21 PROCEDURE — 88365 INSITU HYBRIDIZATION (FISH): CPT | Mod: 26

## 2023-09-21 PROCEDURE — 99233 SBSQ HOSP IP/OBS HIGH 50: CPT

## 2023-09-21 PROCEDURE — 20225 BONE BIOPSY TROCAR/NDL DEEP: CPT

## 2023-09-21 PROCEDURE — 88342 IMHCHEM/IMCYTCHM 1ST ANTB: CPT | Mod: 26,59

## 2023-09-21 PROCEDURE — 88360 TUMOR IMMUNOHISTOCHEM/MANUAL: CPT | Mod: 26

## 2023-09-21 PROCEDURE — 88300 SURGICAL PATH GROSS: CPT | Mod: 26

## 2023-09-21 PROCEDURE — 88341 IMHCHEM/IMCYTCHM EA ADD ANTB: CPT | Mod: 26

## 2023-09-21 RX ORDER — MAGNESIUM SULFATE 500 MG/ML
2 VIAL (ML) INJECTION ONCE
Refills: 0 | Status: COMPLETED | OUTPATIENT
Start: 2023-09-21 | End: 2023-09-21

## 2023-09-21 RX ADMIN — POLYETHYLENE GLYCOL 3350 17 GRAM(S): 17 POWDER, FOR SOLUTION ORAL at 12:08

## 2023-09-21 RX ADMIN — LIDOCAINE 1 PATCH: 4 CREAM TOPICAL at 19:14

## 2023-09-21 RX ADMIN — TAMSULOSIN HYDROCHLORIDE 0.4 MILLIGRAM(S): 0.4 CAPSULE ORAL at 21:53

## 2023-09-21 RX ADMIN — PREGABALIN 1000 MICROGRAM(S): 225 CAPSULE ORAL at 12:09

## 2023-09-21 RX ADMIN — Medication 100 MILLIGRAM(S): at 12:08

## 2023-09-21 RX ADMIN — PANTOPRAZOLE SODIUM 40 MILLIGRAM(S): 20 TABLET, DELAYED RELEASE ORAL at 17:59

## 2023-09-21 RX ADMIN — Medication 1 MILLIGRAM(S): at 12:09

## 2023-09-21 RX ADMIN — Medication 25 GRAM(S): at 12:08

## 2023-09-21 RX ADMIN — LIDOCAINE 1 PATCH: 4 CREAM TOPICAL at 05:09

## 2023-09-21 RX ADMIN — LIDOCAINE 1 PATCH: 4 CREAM TOPICAL at 17:59

## 2023-09-21 RX ADMIN — SODIUM CHLORIDE 75 MILLILITER(S): 9 INJECTION, SOLUTION INTRAVENOUS at 05:08

## 2023-09-21 NOTE — PROGRESS NOTE ADULT - ASSESSMENT
74 y/o M with HTN and BPH presented with generalized fatigue, anorexia, and weight loss for the past month.  Was recently diagnosed with compression fracture and has not been able to do his ADL's.  Was recently back from  with a dx of anemia and advised to get a transfusion but patient and daughter refused.  He is back to [place] for further w/u.  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  Labs revealed WILY    Cardiac Optimization/HTN  - CT abdomen/pelvis showed a lesion arising from the gastric fundus suspicious of GIST  - GI following, s/p EGD large sub mucosal lesion noted in the gastric cardia, GE jn noted at 36cm from the incisors clean based duodenal ulcer  - Tolerated procedure well, cardiac status optimal post operatively   - H/H 7.6/ 22.7, transfuse per primary  - hem/onc , GI following     - Had a normal Stress test in 2/2022 in our office and had a reportedly normal TTE in the [place] last year with his PCP  - EKG showed NSR with Q waves in leads ll and lll, unchanged from previous.  No anginal complaints  - No evidence of any active ischemia     - Holding Lisinopril/HCTZ  in the setting of WILY,    - Creatinine improving now 3.8 <--5.70, renal following  - Okay to give IV hydration  - No evidence of any meaningful volume overload  - No evidence of significant arrhythmia     - BP, HR stable and controlled   - Monitor and replete Lytes. Keep K > 4 and Mg > 2  - Will continue to follow.    Sylvia Ward [facility]  Nurse Practitioner - Cardiology   call TEAMS

## 2023-09-21 NOTE — PRE PROCEDURE NOTE - PRE PROCEDURE EVALUATION
Interventional Radiology Pre-Procedure Note    Patient is a 73y old Male who presents with WILY and MM here for iliac bone marrow biopsy.     PAST MEDICAL & SURGICAL HISTORY:  HTN (hypertension)      BPH (benign prostatic hyperplasia)      History of appendectomy           Allergies: No Known Allergies      LABS:                        7.6    4.52  )-----------( 157      ( 21 Sep 2023 05:30 )             22.7     09-21    142  |  110<H>  |  42<H>  ----------------------------<  97  4.4   |  27  |  3.80<H>    Ca    9.8      21 Sep 2023 05:30  Phos  4.0     09-21  Mg     1.3     09-21    TPro  5.9<L>  /  Alb  x   /  TBili  x   /  DBili  x   /  AST  x   /  ALT  x   /  AlkPhos  x   09-20        Procedure/ risks/ benefits were explained, informed consent obtained from patient, verbalizes understanding.

## 2023-09-21 NOTE — PROGRESS NOTE ADULT - ASSESSMENT
72 yo male with hx of HTN of HTN and BPH, presents with fatigue, anorexia, and weight loss x 1 month, admitted for new gastric mass, anemia and WILY.     Problem/Plan - 1:  ·  Problem: Gastric tumor.   ·  Plan: Pt presents with fatigue, weakness, weight loss, decreased appetite x1 month   - CT Chest, Abd/Pelvis with IV contrast: Exophytic lesion arising from the gastric fundus measuring 4.1 x 5.3 x 5.2 cm, increased in size from CT 8/16/2021 where it measured 3.0 x 3.4 x 3.3 cm.. This may represent a solid mass such as a GIST. Differential includes gastric diverticulum, which is considered less likely.   - lipase: 106  - Nutrition consult   - Pt with new anemia, worsening kidney function, compression fx, Abnormal SPEP +Bence Ruelas.  Pt. scheduled for bone marrow biopsy today.    - GI Dr. Hua consulted  - Heme/onc Dr. Toth consulted  Patient s/p EGD ulcer found with lesion  Plan is for OUTPATIENT EUS and eventual outpatient Surg/onc  at this time monitor hb (BASELINE of 12--> now 8.2) with borderline "OK" b12  protonix bid, cyanocobalamin 1000mcg IM daily--> maintain hb > 8.     Problem/Plan - 2:  ·  Problem: Anemia.   ·  Plan: Hgb/Hct 8.9/26.8 on admission, H/H 14.5/41.7 in Jan 2022, JAN 2023 12   - anemia likely in the setting of  malignancy  and wily  - No s/s bleeding at this time   - Heme/onc Dr. Toth consulted  - Will give 1unit PRBC transfusion today     Problem/Plan - 3:  ·  Problem: WILY (acute kidney injury).   ·  Plan: - BUN/Cr 64/5.70 on admission, BUN/Cr 21/1.1 in Jan 2022, jan 2023 GFR 63  - likely prerenal given decrease PO intake over the last month, no signs of hydronephrosis on CT, no urinary sx.  Also concern for multiple myeloma.  - Pt was given IV contrast in the ED for CT scan  - U/A: Moderate blood, Nitrite neg, Leuk Esterase Neg, occasional bacteria, squamous cells present, so s/s bleeding, f/u UCx   - Hold home lisinopril-HCTZ   - Avoid nephrotoxic medications  - Monitor daily CMP   - nephro consulted.        Problem/Plan - 4:  ·  Problem: Compression fracture of spine.   ·  Plan: CT chest with IV contrast: Age indeterminate compression fracture deformity involving the T8 vertebral body.   - Pt with no point tenderness on exam  - Tylenol PRN for pain  - Lidocaine patch   - PT consult.     Problem/Plan - 5:  ·  Problem: HTN (hypertension).   ·  Plan: Chronic   - Stable  - Holding home amlodipine 10mg QD with hold parameters   - Will HOLD home lisinopril-HCTZ 10-12.5mg in the setting of WILY, may restart once kidney function improves   - Pt. remains normotensive off antihypertensive medications.      Problem/Plan - 6:  ·  Problem: BPH (benign prostatic hyperplasia).   ·  Plan: Chronic   - Continue home tamsulosin 0.4mg QD.     Problem/Plan - 7:  ·  Problem: Abnormal finding on imaging.   ·  Plan: CT A/P: Small hypoattenuating lesion at the gallbladder fossa, incompletely characterized. Hypoattenuating left renal lesions are stable compared with the prior CT 8/16/2021, likely benign cysts  - F/u out patient upon discharge.     Problem/Plan - 8:  ·  Problem: Need for prophylactic measure.   ·  Plan: DVT PPX: Holding heparin 5000U q12 for bone marrow biopsy    #Dispo  - PT consult.

## 2023-09-21 NOTE — PROGRESS NOTE ADULT - ASSESSMENT
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 060A   Wedge compression fracture of T7-T8 vertebra, initial encounter for closed fracture  D63.8         Anemia chronic disease  N17.8         WILY vs CKD  D63.1         Anemia CKD  C90.00       Suspected MM  D51.0        B12 def  D52.0        Folate def  K26.9        Duodenal ulcer    72yo [place] M brought back home from [facility] after pt noted to have decline in health over summer 2023.   PMH HTN and BPH, presents with fatigue, anorexia, and weight loss x 1 month, admitted for new gastric mass, anemia and WILY.   Workup showed urine bence jnes protein and elevated light chains. Concerning for MM  Concurrently with 4xm submucosal gastric lesion.     Gastric tumor. ? GIST  Sx with fatigue, weakness, weight loss, decreased appetite x1 month   weight loss/malnutrition   Exophytic lesion arising from the gastric fundus may represent a solid mass such as a GIST.   Differential includes gastric diverticulum, which is considered less likely.     s/p  upper gastrointestinal endoscopy:  large sub mucosal lesion noted in the gastric cardia  GE jn noted at 36cm from the incisors  Clean based duodenal ulcer  patient is to follow up with Dr. Breonna Lombardi interventional GI at [facility] for EUS/Bx    Duodenal ulcer  monitor for bleeding    Compression Fx  Age indeterminate compression fracture deformity involving the T8 vertebral body. Correlate for point tenderness.  ? osteoporosis vs from MM    Multifactorial anemia  ·	Anemia chronic disease  ·	Anemia CKD  ·	Anemia b12 def, Anemia folate def  ·	Ferritin adequate      RECOMMENDATIONS    Gastric tumor / ?GIST  Reg diet   proton pump inhibitor orally bid  outpt EGD with EUS planned.   patient is to follow up with Dr. Breonna Lombardi interventional GI at [facility] for EUS/Bx  Surgery followup post eval.     Multifactorial Anemia  Anemia. B12 def, Folate def, Anemia CKD, anemia chronic disease  ·	Start IM B12  ·	Star PO Folate  ·	Followup  ESR, CRP  ·	Follow Cr.   Follow CBC, no indication for transfusion at current time.   ·	Transfuse PRBC as clinically indicated.   ·	Transfuse PRBC if Hgb <7.0 or if symptomatic.     WILY / MM / Bence Ruelas protein  tentatively planned for BM bx tomorrow.   Defer renal bx due to higher risk.   NPO p MN    Compression Fx  Check VitD  Consider DEXA  Check skeletal survey  DVT Prophylaxis.   SQ Lovenox or SQ heparin.     to followup on bone marrow asp/bx pathology    dtr Silvia  [phone] cell  [phone] home    PMD Dr Tramaine Barnes [[facility]]  [phone]

## 2023-09-21 NOTE — PROGRESS NOTE ADULT - ASSESSMENT
WILY, ? Baseline CKD 3: Prerenal azotemia, ARB Rx  Nephrotic range proteinuria: DDx membranous nephropathy  Anemia, Abnormal SPEP, ? MM  Hypertension  Gastric mass    09/18/23: Continue IV hydration. Avoid nephrotoxic meds as possible. Avoid ACEI, ARB, NSAIDs. GI work up. Check serologies. May need kidney biopsy.   Monitor h/h trend. Transfuse PRBC's PRN. Check SPEP. Monitor BP trend. Titrate BP meds as needed.   Will follow electrolytes and renal function trend. D/w patients family at bedside.   09/19/23: s/p EGD. Will continue IVF. Labs pending. D/w pt's daughter at bedside.   09/20/23: Improving creatinine trend. Strict I & O. Serologies pending. Abnormal SPEP. Hematology follow up.   Monitor h/h trend. Transfuse PRBC's PRN. D/w pt's daughter at bedside.   09/21/23: Bone marrow biopsy by IR. Improving creatinine trend. Will continue IVF for now. Hematology follow up. D/w pt's daughter at bedside.

## 2023-09-22 ENCOUNTER — TRANSCRIPTION ENCOUNTER (OUTPATIENT)
Age: 73
End: 2023-09-22

## 2023-09-22 LAB
ANION GAP SERPL CALC-SCNC: 7 MMOL/L — SIGNIFICANT CHANGE UP (ref 5–17)
BASOPHILS # BLD AUTO: 0.03 K/UL — SIGNIFICANT CHANGE UP (ref 0–0.2)
BASOPHILS NFR BLD AUTO: 0.6 % — SIGNIFICANT CHANGE UP (ref 0–2)
BUN SERPL-MCNC: 40 MG/DL — HIGH (ref 7–23)
CALCIUM SERPL-MCNC: 9.5 MG/DL — SIGNIFICANT CHANGE UP (ref 8.5–10.1)
CHLORIDE SERPL-SCNC: 110 MMOL/L — HIGH (ref 96–108)
CO2 SERPL-SCNC: 25 MMOL/L — SIGNIFICANT CHANGE UP (ref 22–31)
CREAT SERPL-MCNC: 3.2 MG/DL — HIGH (ref 0.5–1.3)
EGFR: 20 ML/MIN/1.73M2 — LOW
EOSINOPHIL # BLD AUTO: 0.1 K/UL — SIGNIFICANT CHANGE UP (ref 0–0.5)
EOSINOPHIL NFR BLD AUTO: 2.1 % — SIGNIFICANT CHANGE UP (ref 0–6)
GLUCOSE SERPL-MCNC: 93 MG/DL — SIGNIFICANT CHANGE UP (ref 70–99)
HCT VFR BLD CALC: 23.8 % — LOW (ref 39–50)
HGB BLD-MCNC: 8.3 G/DL — LOW (ref 13–17)
IMM GRANULOCYTES NFR BLD AUTO: 1.7 % — HIGH (ref 0–0.9)
LYMPHOCYTES # BLD AUTO: 2.15 K/UL — SIGNIFICANT CHANGE UP (ref 1–3.3)
LYMPHOCYTES # BLD AUTO: 45.4 % — HIGH (ref 13–44)
MAGNESIUM SERPL-MCNC: 2 MG/DL — SIGNIFICANT CHANGE UP (ref 1.6–2.6)
MCHC RBC-ENTMCNC: 31 PG — SIGNIFICANT CHANGE UP (ref 27–34)
MCHC RBC-ENTMCNC: 34.9 GM/DL — SIGNIFICANT CHANGE UP (ref 32–36)
MCV RBC AUTO: 88.8 FL — SIGNIFICANT CHANGE UP (ref 80–100)
MONOCYTES # BLD AUTO: 0.28 K/UL — SIGNIFICANT CHANGE UP (ref 0–0.9)
MONOCYTES NFR BLD AUTO: 5.9 % — SIGNIFICANT CHANGE UP (ref 2–14)
NEUTROPHILS # BLD AUTO: 2.1 K/UL — SIGNIFICANT CHANGE UP (ref 1.8–7.4)
NEUTROPHILS NFR BLD AUTO: 44.3 % — SIGNIFICANT CHANGE UP (ref 43–77)
NRBC # BLD: 0 /100 WBCS — SIGNIFICANT CHANGE UP (ref 0–0)
PLATELET # BLD AUTO: 155 K/UL — SIGNIFICANT CHANGE UP (ref 150–400)
POTASSIUM SERPL-MCNC: 3.8 MMOL/L — SIGNIFICANT CHANGE UP (ref 3.5–5.3)
POTASSIUM SERPL-SCNC: 3.8 MMOL/L — SIGNIFICANT CHANGE UP (ref 3.5–5.3)
RBC # BLD: 2.68 M/UL — LOW (ref 4.2–5.8)
RBC # FLD: 13.3 % — SIGNIFICANT CHANGE UP (ref 10.3–14.5)
SODIUM SERPL-SCNC: 142 MMOL/L — SIGNIFICANT CHANGE UP (ref 135–145)
WBC # BLD: 4.74 K/UL — SIGNIFICANT CHANGE UP (ref 3.8–10.5)
WBC # FLD AUTO: 4.74 K/UL — SIGNIFICANT CHANGE UP (ref 3.8–10.5)

## 2023-09-22 PROCEDURE — 99233 SBSQ HOSP IP/OBS HIGH 50: CPT

## 2023-09-22 PROCEDURE — 99232 SBSQ HOSP IP/OBS MODERATE 35: CPT

## 2023-09-22 RX ORDER — SODIUM CHLORIDE 9 MG/ML
1000 INJECTION, SOLUTION INTRAVENOUS
Refills: 0 | Status: DISCONTINUED | OUTPATIENT
Start: 2023-09-22 | End: 2023-09-23

## 2023-09-22 RX ORDER — HEPARIN SODIUM 5000 [USP'U]/ML
5000 INJECTION INTRAVENOUS; SUBCUTANEOUS EVERY 12 HOURS
Refills: 0 | Status: DISCONTINUED | OUTPATIENT
Start: 2023-09-23 | End: 2023-09-26

## 2023-09-22 RX ADMIN — Medication 650 MILLIGRAM(S): at 12:04

## 2023-09-22 RX ADMIN — PANTOPRAZOLE SODIUM 40 MILLIGRAM(S): 20 TABLET, DELAYED RELEASE ORAL at 06:13

## 2023-09-22 RX ADMIN — Medication 100 MILLIGRAM(S): at 12:04

## 2023-09-22 RX ADMIN — Medication 650 MILLIGRAM(S): at 12:30

## 2023-09-22 RX ADMIN — Medication 1 MILLIGRAM(S): at 12:04

## 2023-09-22 RX ADMIN — LIDOCAINE 1 PATCH: 4 CREAM TOPICAL at 18:59

## 2023-09-22 RX ADMIN — PANTOPRAZOLE SODIUM 40 MILLIGRAM(S): 20 TABLET, DELAYED RELEASE ORAL at 18:59

## 2023-09-22 RX ADMIN — LIDOCAINE 1 PATCH: 4 CREAM TOPICAL at 05:10

## 2023-09-22 RX ADMIN — TAMSULOSIN HYDROCHLORIDE 0.4 MILLIGRAM(S): 0.4 CAPSULE ORAL at 21:27

## 2023-09-22 RX ADMIN — PREGABALIN 1000 MICROGRAM(S): 225 CAPSULE ORAL at 12:04

## 2023-09-22 RX ADMIN — POLYETHYLENE GLYCOL 3350 17 GRAM(S): 17 POWDER, FOR SOLUTION ORAL at 12:05

## 2023-09-22 RX ADMIN — LIDOCAINE 1 PATCH: 4 CREAM TOPICAL at 19:08

## 2023-09-22 RX ADMIN — SODIUM CHLORIDE 50 MILLILITER(S): 9 INJECTION, SOLUTION INTRAVENOUS at 16:02

## 2023-09-22 NOTE — PROGRESS NOTE ADULT - ASSESSMENT
74 yo male with hx of HTN of HTN and BPH, presents with fatigue, anorexia, and weight loss x 1 month, admitted for new gastric mass, anemia and WILY.     Problem/Plan - 1:  ·  Problem: Gastric tumor.   ·  Plan: Pt presents with fatigue, weakness, weight loss, decreased appetite x1 month   - CT Chest, Abd/Pelvis with IV contrast: Exophytic lesion arising from the gastric fundus measuring 4.1 x 5.3 x 5.2 cm, increased in size from CT 8/16/2021 where it measured 3.0 x 3.4 x 3.3 cm.. This may represent a solid mass such as a GIST. Differential includes gastric diverticulum, which is considered less likely.   - lipase: 106  - Nutrition consult   - Pt with new anemia, worsening kidney function, compression fx, Abnormal SPEP +Bence Ruelas.  Pt. s/p bone marrow biopsy on 9/21.    - GI Dr. Hua consulted  - Heme/onc Dr. Toth consulted  Patient s/p EGD ulcer found with lesion  Plan is for OUTPATIENT EUS and eventual outpatient Surg/onc  at this time monitor hb (BASELINE of 12--> now 8.2) with borderline "OK" b12  protonix bid, cyanocobalamin 1000mcg IM daily--> maintain hb > 8.     Problem/Plan - 2:  ·  Problem: Anemia.   ·  Plan: Hgb/Hct 8.9/26.8 on admission, H/H 14.5/41.7 in Jan 2022, JAN 2023 12   - anemia likely in the setting of  malignancy  and wily  - No s/s bleeding at this time   - Heme/onc Dr. Toth consulted  - s/p 1unit PRBC transfusion on 9/21 with increase hbg to 8.3     Problem/Plan - 3:  ·  Problem: WILY (acute kidney injury).   ·  Plan: - BUN/Cr 64/5.70 on admission, BUN/Cr 21/1.1 in Jan 2022, jan 2023 GFR 63  - likely prerenal given decrease PO intake over the last month, no signs of hydronephrosis on CT, no urinary sx.  Also concern for multiple myeloma.  - Pt was given IV contrast in the ED for CT scan  - U/A: Moderate blood, Nitrite neg, Leuk Esterase Neg, occasional bacteria, squamous cells present, so s/s bleeding, f/u UCx   - Hold home lisinopril-HCTZ   - Avoid nephrotoxic medications  - Monitor daily CMP   - nephro consulted.        Problem/Plan - 4:  ·  Problem: Compression fracture of spine.   ·  Plan: CT chest with IV contrast: Age indeterminate compression fracture deformity involving the T8 vertebral body.   - Pt with no point tenderness on exam  - Tylenol PRN for pain  - Lidocaine patch   - PT consult.     Problem/Plan - 5:  ·  Problem: HTN (hypertension).   ·  Plan: Chronic   - Stable  - Holding home amlodipine 10mg QD with hold parameters   - Will HOLD home lisinopril-HCTZ 10-12.5mg in the setting of WILY, may restart once kidney function improves   - Pt. has been normotensive off antihypertensive medications.      Problem/Plan - 6:  ·  Problem: BPH (benign prostatic hyperplasia).   ·  Plan: Chronic   - Continue home tamsulosin 0.4mg QD.     Problem/Plan - 7:  ·  Problem: Abnormal finding on imaging.   ·  Plan: CT A/P: Small hypoattenuating lesion at the gallbladder fossa, incompletely characterized. Hypoattenuating left renal lesions are stable compared with the prior CT 8/16/2021, likely benign cysts  - F/u out patient upon discharge.     Problem/Plan - 8:  ·  Problem: Need for prophylactic measure.   ·  Plan: DVT PPX: heparin 5000U q12     #Dispo  - PT consult.

## 2023-09-22 NOTE — PROGRESS NOTE ADULT - ASSESSMENT
WILY, ? Baseline CKD 3: Prerenal azotemia, ARB Rx  Nephrotic range proteinuria: DDx membranous nephropathy  Anemia, Abnormal SPEP, ? MM  Hypertension  Gastric mass    09/18/23: Continue IV hydration. Avoid nephrotoxic meds as possible. Avoid ACEI, ARB, NSAIDs. GI work up. Check serologies. May need kidney biopsy.   Monitor h/h trend. Transfuse PRBC's PRN. Check SPEP. Monitor BP trend. Titrate BP meds as needed.   Will follow electrolytes and renal function trend. D/w patients family at bedside.   09/19/23: s/p EGD. Will continue IVF. Labs pending. D/w pt's daughter at bedside.   09/20/23: Improving creatinine trend. Strict I & O. Serologies pending. Abnormal SPEP. Hematology follow up.   Monitor h/h trend. Transfuse PRBC's PRN. D/w pt's daughter at bedside.   09/21/23: Bone marrow biopsy by IR. Improving creatinine trend. Will continue IVF for now. Hematology follow up. D/w pt's daughter at bedside.   09/22/23: Improving renal indices. To continue current meds. Hematology follow up. Avoid nephrotoxic meds as possible.

## 2023-09-22 NOTE — DISCHARGE NOTE NURSING/CASE MANAGEMENT/SOCIAL WORK - PATIENT PORTAL LINK FT
You can access the FollowMyHealth Patient Portal offered by [facility] by registering at the following website: [email or web address]. By joining [employer]’s FollowMyHealth portal, you will also be able to view your health information using other applications (apps) compatible with our system.

## 2023-09-22 NOTE — PROGRESS NOTE ADULT - ASSESSMENT
gastric mass/GIST  weight loss/malnutrition     s/p  upper gastrointestinal endoscopy:  large sub mucosal lesion noted in the gastric cardia  GE jn noted at 36cm from the incisors  clean based duodenal ulcer    rec:   proton pump inhibitor orally bid  patient is to follow up with Dr. Breonna Lombardi interventional GI at [facility] for EUS/Bx  rec trial of Remeron to boost appetite  prbc transfusions per heme/onc recs; input appreciated  f/u BM bx

## 2023-09-22 NOTE — PROGRESS NOTE ADULT - ASSESSMENT
74 y/o M with HTN and BPH presented with generalized fatigue, anorexia, and weight loss for the past month.  Was recently diagnosed with compression fracture and has not been able to do his ADL's.  Was recently back from  with a dx of anemia and advised to get a transfusion but patient and daughter refused.  He is back to [place] for further w/u.  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  Labs revealed WILY    Cardiac Optimization/HTN  - CT abdomen/pelvis showed a lesion arising from the gastric fundus suspicious of GIST  - s/p EGD large sub mucosal lesion noted in the gastric cardia, GE jn noted at 36cm from the incisors clean based duodenal ulcer, GI following   - Tolerated procedure well, cardiac status optimal post operatively   - H/H 7.6/ 22.7 s/p PRBC tx, now 8.3/23.8, continue to trend, transfuse per primary  - hem/onc following, workup ongoing, s/p bone marrow bx     - Had a normal Stress test in 2/2022 in our office and had a reportedly normal TTE in the [place] last year with his PCP  - EKG: NSR with Q waves in leads ll and lll, unchanged from previous.   - No anginal complaints  - No evidence of any active ischemia     - Holding Lisinopril/HCTZ  in the setting of WILY,    - Creatinine improving now 3.2 <--5.70, renal following  - Okay to give IV hydration  - No evidence of any meaningful volume overload  - No evidence of significant arrhythmia     - BP, HR stable and controlled   - Monitor and replete Lytes. Keep K > 4 and Mg > 2  - Will continue to follow.    JENNIFER Sumner  Nurse Practitioner - Cardiology   call TEAMS

## 2023-09-22 NOTE — PROGRESS NOTE ADULT - ASSESSMENT
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 060A   Wedge compression fracture of T7-T8 vertebra, initial encounter for closed fracture  D63.8         Anemia chronic disease  N17.8         WILY vs CKD  D63.1         Anemia CKD  C90.00       Suspected MM  D51.0        B12 def  D52.0        Folate def  K26.9        Duodenal ulcer    74yo [place] M brought back home from [facility] after pt noted to have decline in health over summer 2023.   PMH HTN and BPH, presents with fatigue, anorexia, and weight loss x 1 month, admitted for new gastric mass, anemia and WILY.   Workup showed urine bence jnes protein and elevated light chains. Concerning for MM  Concurrently with 4xm submucosal gastric lesion.     Gastric tumor. ? GIST  Sx with fatigue, weakness, weight loss, decreased appetite x1 month   weight loss/malnutrition   Exophytic lesion arising from the gastric fundus may represent a solid mass such as a GIST.   Differential includes gastric diverticulum, which is considered less likely.     s/p  upper gastrointestinal endoscopy:  large sub mucosal lesion noted in the gastric cardia  GE jn noted at 36cm from the incisors  Clean based duodenal ulcer  patient is to follow up with Dr. Breonna Lombardi interventional GI at [facility] for EUS/Bx    Duodenal ulcer  monitor for bleeding    Compression Fx  Age indeterminate compression fracture deformity involving the T8 vertebral body. Correlate for point tenderness.  ? osteoporosis vs from MM    Multifactorial anemia  ·	Anemia chronic disease  ·	Anemia CKD  ·	Anemia b12 def, Anemia folate def  ·	Ferritin adequate      RECOMMENDATIONS    Gastric tumor / ?GIST  Reg diet   proton pump inhibitor orally bid  outpt EGD with EUS planned.   patient is to follow up with Dr. Breonna Lombardi interventional GI at [facility] for EUS/Bx  Surgery followup post eval.     Multifactorial Anemia  Anemia. B12 def, Folate def, Anemia CKD, anemia chronic disease  ·	Start IM B12  ·	Star PO Folate  ·	Followup  ESR, CRP  ·	Follow Cr.   Follow CBC, no indication for transfusion at current time.   ·	Transfuse PRBC as clinically indicated.   ·	Transfuse PRBC if Hgb <7.0 or if symptomatic.     WILY / MM / Bence Ruelas protein    Compression Fx  Check VitD  Consider DEXA  Check skeletal survey  DVT Prophylaxis.   SQ Lovenox or SQ heparin.   Cr improving  CBC stable  to followup on bone marrow asp/bx pathology    dtr Silvia  [phone] cell  [phone] home    PMD Dr Tramaine Barnes [[facility]]  [phone]

## 2023-09-22 NOTE — DISCHARGE NOTE NURSING/CASE MANAGEMENT/SOCIAL WORK - NSDCCRNAME_GEN_ALL_CORE_FT
You have been recommended for home PT services.  The MD will provide a prescription for home PT, call one of the vendors listed ( or one of your choosing in your area) with your insurance information.  They will also need the origional prescription.   Rehab at home [phone]  [facility] [phone]  [facility] [phone]  [facility] [phone]  [facility] [phone]

## 2023-09-22 NOTE — DISCHARGE NOTE NURSING/CASE MANAGEMENT/SOCIAL WORK - NSDCPEFALRISK_GEN_ALL_CORE
For information on Fall & Injury Prevention, visit: [email or web address] OR  [email or web address] OR  https://www.cdc.gov/steadi/patient.html

## 2023-09-23 LAB
% ALBUMIN: SIGNIFICANT CHANGE UP %
% ALBUMIN: SIGNIFICANT CHANGE UP %
% ALPHA 1: SIGNIFICANT CHANGE UP %
% ALPHA 1: SIGNIFICANT CHANGE UP %
% ALPHA 2: SIGNIFICANT CHANGE UP %
% ALPHA 2: SIGNIFICANT CHANGE UP %
% BETA: SIGNIFICANT CHANGE UP %
% BETA: SIGNIFICANT CHANGE UP %
% GAMMA: SIGNIFICANT CHANGE UP %
% GAMMA: SIGNIFICANT CHANGE UP %
% M SPIKE: SIGNIFICANT CHANGE UP %
% M SPIKE: SIGNIFICANT CHANGE UP %
ALBUMIN SERPL ELPH-MCNC: SIGNIFICANT CHANGE UP G/DL (ref 3.6–5.5)
ALBUMIN SERPL ELPH-MCNC: SIGNIFICANT CHANGE UP G/DL (ref 3.6–5.5)
ALPHA1 GLOB SERPL ELPH-MCNC: SIGNIFICANT CHANGE UP G/DL (ref 0.1–0.4)
ALPHA1 GLOB SERPL ELPH-MCNC: SIGNIFICANT CHANGE UP G/DL (ref 0.1–0.4)
ALPHA2 GLOB SERPL ELPH-MCNC: SIGNIFICANT CHANGE UP G/DL (ref 0.5–1)
ALPHA2 GLOB SERPL ELPH-MCNC: SIGNIFICANT CHANGE UP G/DL (ref 0.5–1)
ANION GAP SERPL CALC-SCNC: 6 MMOL/L — SIGNIFICANT CHANGE UP (ref 5–17)
B-GLOBULIN SERPL ELPH-MCNC: SIGNIFICANT CHANGE UP G/DL (ref 0.5–1)
B-GLOBULIN SERPL ELPH-MCNC: SIGNIFICANT CHANGE UP G/DL (ref 0.5–1)
BASOPHILS # BLD AUTO: 0.02 K/UL — SIGNIFICANT CHANGE UP (ref 0–0.2)
BASOPHILS NFR BLD AUTO: 0.4 % — SIGNIFICANT CHANGE UP (ref 0–2)
BUN SERPL-MCNC: 39 MG/DL — HIGH (ref 7–23)
CALCIUM SERPL-MCNC: 9.9 MG/DL — SIGNIFICANT CHANGE UP (ref 8.5–10.1)
CHLORIDE SERPL-SCNC: 110 MMOL/L — HIGH (ref 96–108)
CO2 SERPL-SCNC: 27 MMOL/L — SIGNIFICANT CHANGE UP (ref 22–31)
CREAT SERPL-MCNC: 2.6 MG/DL — HIGH (ref 0.5–1.3)
EGFR: 25 ML/MIN/1.73M2 — LOW
EOSINOPHIL # BLD AUTO: 0.13 K/UL — SIGNIFICANT CHANGE UP (ref 0–0.5)
EOSINOPHIL NFR BLD AUTO: 2.9 % — SIGNIFICANT CHANGE UP (ref 0–6)
GAMMA GLOBULIN: SIGNIFICANT CHANGE UP G/DL (ref 0.6–1.6)
GAMMA GLOBULIN: SIGNIFICANT CHANGE UP G/DL (ref 0.6–1.6)
GLUCOSE SERPL-MCNC: 94 MG/DL — SIGNIFICANT CHANGE UP (ref 70–99)
HCT VFR BLD CALC: 25.9 % — LOW (ref 39–50)
HGB BLD-MCNC: 8.7 G/DL — LOW (ref 13–17)
IMM GRANULOCYTES NFR BLD AUTO: 1.3 % — HIGH (ref 0–0.9)
LYMPHOCYTES # BLD AUTO: 1.95 K/UL — SIGNIFICANT CHANGE UP (ref 1–3.3)
LYMPHOCYTES # BLD AUTO: 43.1 % — SIGNIFICANT CHANGE UP (ref 13–44)
M-SPIKE: SIGNIFICANT CHANGE UP G/DL (ref 0–0)
M-SPIKE: SIGNIFICANT CHANGE UP G/DL (ref 0–0)
MAGNESIUM SERPL-MCNC: 1.7 MG/DL — SIGNIFICANT CHANGE UP (ref 1.6–2.6)
MCHC RBC-ENTMCNC: 30.1 PG — SIGNIFICANT CHANGE UP (ref 27–34)
MCHC RBC-ENTMCNC: 33.6 GM/DL — SIGNIFICANT CHANGE UP (ref 32–36)
MCV RBC AUTO: 89.6 FL — SIGNIFICANT CHANGE UP (ref 80–100)
MONOCYTES # BLD AUTO: 0.32 K/UL — SIGNIFICANT CHANGE UP (ref 0–0.9)
MONOCYTES NFR BLD AUTO: 7.1 % — SIGNIFICANT CHANGE UP (ref 2–14)
NEUTROPHILS # BLD AUTO: 2.04 K/UL — SIGNIFICANT CHANGE UP (ref 1.8–7.4)
NEUTROPHILS NFR BLD AUTO: 45.2 % — SIGNIFICANT CHANGE UP (ref 43–77)
NRBC # BLD: 0 /100 WBCS — SIGNIFICANT CHANGE UP (ref 0–0)
PLATELET # BLD AUTO: 168 K/UL — SIGNIFICANT CHANGE UP (ref 150–400)
POTASSIUM SERPL-MCNC: 3.9 MMOL/L — SIGNIFICANT CHANGE UP (ref 3.5–5.3)
POTASSIUM SERPL-SCNC: 3.9 MMOL/L — SIGNIFICANT CHANGE UP (ref 3.5–5.3)
PROT PATTERN SERPL ELPH-IMP: SIGNIFICANT CHANGE UP
PROT PATTERN SERPL ELPH-IMP: SIGNIFICANT CHANGE UP
RBC # BLD: 2.89 M/UL — LOW (ref 4.2–5.8)
RBC # FLD: 13.8 % — SIGNIFICANT CHANGE UP (ref 10.3–14.5)
SODIUM SERPL-SCNC: 143 MMOL/L — SIGNIFICANT CHANGE UP (ref 135–145)
WBC # BLD: 4.52 K/UL — SIGNIFICANT CHANGE UP (ref 3.8–10.5)
WBC # FLD AUTO: 4.52 K/UL — SIGNIFICANT CHANGE UP (ref 3.8–10.5)

## 2023-09-23 PROCEDURE — 99233 SBSQ HOSP IP/OBS HIGH 50: CPT

## 2023-09-23 PROCEDURE — 99232 SBSQ HOSP IP/OBS MODERATE 35: CPT

## 2023-09-23 RX ORDER — SODIUM CHLORIDE 9 MG/ML
1000 INJECTION, SOLUTION INTRAVENOUS
Refills: 0 | Status: DISCONTINUED | OUTPATIENT
Start: 2023-09-23 | End: 2023-09-26

## 2023-09-23 RX ADMIN — SODIUM CHLORIDE 75 MILLILITER(S): 9 INJECTION, SOLUTION INTRAVENOUS at 23:51

## 2023-09-23 RX ADMIN — PREGABALIN 1000 MICROGRAM(S): 225 CAPSULE ORAL at 11:51

## 2023-09-23 RX ADMIN — POLYETHYLENE GLYCOL 3350 17 GRAM(S): 17 POWDER, FOR SOLUTION ORAL at 11:51

## 2023-09-23 RX ADMIN — Medication 100 MILLIGRAM(S): at 11:51

## 2023-09-23 RX ADMIN — LIDOCAINE 1 PATCH: 4 CREAM TOPICAL at 06:10

## 2023-09-23 RX ADMIN — Medication 1 MILLIGRAM(S): at 11:51

## 2023-09-23 RX ADMIN — PANTOPRAZOLE SODIUM 40 MILLIGRAM(S): 20 TABLET, DELAYED RELEASE ORAL at 05:33

## 2023-09-23 RX ADMIN — LIDOCAINE 1 PATCH: 4 CREAM TOPICAL at 19:18

## 2023-09-23 RX ADMIN — SODIUM CHLORIDE 50 MILLILITER(S): 9 INJECTION, SOLUTION INTRAVENOUS at 11:52

## 2023-09-23 RX ADMIN — TAMSULOSIN HYDROCHLORIDE 0.4 MILLIGRAM(S): 0.4 CAPSULE ORAL at 21:51

## 2023-09-23 RX ADMIN — LIDOCAINE 1 PATCH: 4 CREAM TOPICAL at 17:48

## 2023-09-23 RX ADMIN — HEPARIN SODIUM 5000 UNIT(S): 5000 INJECTION INTRAVENOUS; SUBCUTANEOUS at 17:47

## 2023-09-23 RX ADMIN — PANTOPRAZOLE SODIUM 40 MILLIGRAM(S): 20 TABLET, DELAYED RELEASE ORAL at 17:47

## 2023-09-23 RX ADMIN — HEPARIN SODIUM 5000 UNIT(S): 5000 INJECTION INTRAVENOUS; SUBCUTANEOUS at 05:34

## 2023-09-23 NOTE — PROGRESS NOTE ADULT - ASSESSMENT
gastric mass/GIST  weight loss/malnutrition     s/p  upper gastrointestinal endoscopy:  large sub mucosal lesion noted in the gastric cardia  GE jn noted at 36cm from the incisors  clean based duodenal ulcer    rec:   proton pump inhibitor orally bid  patient is to follow up with Dr. Breonna Lombardi interventional GI at [facility] for EUS/Bx  rec trial of Remeron to boost appetite  prbc transfusions per heme/onc recs; input appreciated  f/u BM bx

## 2023-09-23 NOTE — PROGRESS NOTE ADULT - ASSESSMENT
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 060A   Wedge compression fracture of T7-T8 vertebra, initial encounter for closed fracture  D63.8         Anemia chronic disease  N17.8         WILY vs CKD  D63.1         Anemia CKD  C90.00       Suspected MM  D51.0        B12 def  D52.0        Folate def  K26.9        Duodenal ulcer    72yo Niuean M brought back home from [facility] after pt noted to have decline in health over summer 2023.   PMH HTN and BPH, presents with fatigue, anorexia, and weight loss x 1 month, admitted for new gastric mass, anemia and WILY.   Workup showed urine bence jnes protein and elevated light chains. Concerning for MM  Concurrently with 4xm submucosal gastric lesion.     Gastric tumor. ? GIST  Sx with fatigue, weakness, weight loss, decreased appetite x1 month   weight loss/malnutrition   Exophytic lesion arising from the gastric fundus may represent a solid mass such as a GIST.   Differential includes gastric diverticulum, which is considered less likely.     s/p  upper gastrointestinal endoscopy:  large sub mucosal lesion noted in the gastric cardia  GE jn noted at 36cm from the incisors  Clean based duodenal ulcer  patient is to follow up with Dr. Breonna Lombardi interventional GI at [facility] for EUS/Bx    Duodenal ulcer  monitor for bleeding    Compression Fx  Age indeterminate compression fracture deformity involving the T8 vertebral body. Correlate for point tenderness.  ? osteoporosis vs from MM    Multifactorial anemia  ·	Anemia chronic disease  ·	Anemia CKD  ·	Anemia b12 def, Anemia folate def  ·	Ferritin adequate      RECOMMENDATIONS    Gastric tumor / ?GIST  Reg diet   proton pump inhibitor orally bid  outpt EGD with EUS planned.   patient is to follow up with Dr. Breonna Lombardi interventional GI at [facility] for EUS/Bx  Surgery followup post eval.     Multifactorial Anemia  Anemia. B12 def, Folate def, Anemia CKD, anemia chronic disease  ·	Start IM B12  ·	Star PO Folate  ·	Followup  ESR, CRP  ·	Follow Cr.   Follow CBC, no indication for transfusion at current time.   ·	Transfuse PRBC as clinically indicated.   ·	Transfuse PRBC if Hgb <7.0 or if symptomatic.     WILY / MM / Bence Ruelas protein - elevated light chains    Compression Fx  Check VitD  Consider DEXA  Check skeletal survey  DVT Prophylaxis.   SQ Lovenox or SQ heparin.   Cr continues to improve  CBC stable  to followup on bone marrow asp/bx pathology    dtr Silvia  [phone] cell  [phone] home    PMD Dr Tramaine Barnes [[facility]]  [phone]

## 2023-09-23 NOTE — PROGRESS NOTE ADULT - ASSESSMENT
72 yo male with hx of HTN of HTN and BPH, presents with fatigue, anorexia, and weight loss x 1 month, admitted for new gastric mass, anemia and WILY.     Problem/Plan - 1:  ·  Problem: Gastric tumor.   ·  Plan: Pt presents with fatigue, weakness, weight loss, decreased appetite x1 month   - CT Chest, Abd/Pelvis with IV contrast: Exophytic lesion arising from the gastric fundus measuring 4.1 x 5.3 x 5.2 cm, increased in size from CT 8/16/2021 where it measured 3.0 x 3.4 x 3.3 cm.. This may represent a solid mass such as a GIST. Differential includes gastric diverticulum, which is considered less likely.   - lipase: 106  - Nutrition consult   - Pt with new anemia, worsening kidney function, compression fx, Abnormal SPEP +Bence Ruelas.  Pt. s/p bone marrow biopsy on 9/21.  Check pathology and flow cytometry.  - GI Dr. Hua consulted  - Heme/onc Dr. Toth consulted  Patient s/p EGD ulcer found with lesion  Plan is for OUTPATIENT EUS and eventual outpatient Surg/onc  at this time monitor hb (BASELINE of 12--> now 8.2) with borderline "OK" b12  protonix bid, cyanocobalamin 1000mcg IM daily--> maintain hb > 8.     Problem/Plan - 2:  ·  Problem: Anemia.   ·  Plan: Hgb/Hct 8.9/26.8 on admission, H/H 14.5/41.7 in Jan 2022, JAN 2023 12   - anemia likely in the setting of  malignancy  and wily  - No s/s bleeding at this time   - Heme/onc Dr. Toth consulted  - s/p 1unit PRBC transfusion on 9/21 with increase hbg to 8.3     Problem/Plan - 3:  ·  Problem: WILY (acute kidney injury).   ·  Plan: - BUN/Cr 64/5.70 on admission, BUN/Cr 21/1.1 in Jan 2022, jan 2023 GFR 63  - likely prerenal given decrease PO intake over the last month, no signs of hydronephrosis on CT, no urinary sx.  Also concern for possible multiple myeloma.  - Pt was given IV contrast in the ED for CT scan  - U/A: Moderate blood, Nitrite neg, Leuk Esterase Neg, occasional bacteria, squamous cells present, so s/s bleeding, f/u UCx   - Hold home lisinopril-HCTZ   - Avoid nephrotoxic medications  - Monitor daily CMP   - nephro consulted.        Problem/Plan - 4:  ·  Problem: Compression fracture of spine.   ·  Plan: CT chest with IV contrast: Age indeterminate compression fracture deformity involving the T8 vertebral body.   - Pt with no point tenderness on exam  - Tylenol PRN for pain  - Lidocaine patch   - PT consult.     Problem/Plan - 5:  ·  Problem: HTN (hypertension).   ·  Plan: Chronic   - Stable  - Holding home amlodipine 10mg QD with hold parameters   - Will HOLD home lisinopril-HCTZ 10-12.5mg in the setting of WILY, may restart once kidney function improves   - Pt. has been normotensive off antihypertensive medications.      Problem/Plan - 6:  ·  Problem: BPH (benign prostatic hyperplasia).   ·  Plan: Chronic   - Continue home tamsulosin 0.4mg QD.     Problem/Plan - 7:  ·  Problem: Abnormal finding on imaging.   ·  Plan: CT A/P: Small hypoattenuating lesion at the gallbladder fossa, incompletely characterized. Hypoattenuating left renal lesions are stable compared with the prior CT 8/16/2021, likely benign cysts  - F/u out patient upon discharge.     Problem/Plan - 8:  ·  Problem: Need for prophylactic measure.   ·  Plan: DVT PPX: heparin 5000U q12     #Dispo  - PT consult.

## 2023-09-23 NOTE — PROGRESS NOTE ADULT - ASSESSMENT
74 y/o M with HTN and BPH presented with generalized fatigue, anorexia, and weight loss for the past month.  Was recently diagnosed with compression fracture and has not been able to do his ADL's.  Was recently back from  with a dx of anemia and advised to get a transfusion but patient and daughter refused.  He is back to [place] for further w/u.  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   - No anginal complaints  - No evidence of any active ischemia     - Holding Lisinopril/HCTZ  in the setting of WILY,    - Creatinine continues to improve now 2.6 <--5.70, renal following  - on IV hydration  - No evidence of any meaningful volume overload  - No evidence of significant arrhythmia     - BP, HR stable and controlled   - Monitor and replete Lytes. Keep K > 4 and Mg > 2  - Will continue to follow.    JENNIFER Sumner  Nurse Practitioner - Cardiology   call TEAMS

## 2023-09-24 LAB
ANION GAP SERPL CALC-SCNC: 6 MMOL/L — SIGNIFICANT CHANGE UP (ref 5–17)
BASOPHILS # BLD AUTO: 0.03 K/UL — SIGNIFICANT CHANGE UP (ref 0–0.2)
BASOPHILS NFR BLD AUTO: 0.6 % — SIGNIFICANT CHANGE UP (ref 0–2)
BUN SERPL-MCNC: 36 MG/DL — HIGH (ref 7–23)
CALCIUM SERPL-MCNC: 9.9 MG/DL — SIGNIFICANT CHANGE UP (ref 8.5–10.1)
CHLORIDE SERPL-SCNC: 108 MMOL/L — SIGNIFICANT CHANGE UP (ref 96–108)
CO2 SERPL-SCNC: 27 MMOL/L — SIGNIFICANT CHANGE UP (ref 22–31)
CREAT SERPL-MCNC: 2.4 MG/DL — HIGH (ref 0.5–1.3)
EGFR: 28 ML/MIN/1.73M2 — LOW
EOSINOPHIL # BLD AUTO: 0.11 K/UL — SIGNIFICANT CHANGE UP (ref 0–0.5)
EOSINOPHIL NFR BLD AUTO: 2.3 % — SIGNIFICANT CHANGE UP (ref 0–6)
GLUCOSE SERPL-MCNC: 93 MG/DL — SIGNIFICANT CHANGE UP (ref 70–99)
HCT VFR BLD CALC: 26 % — LOW (ref 39–50)
HGB BLD-MCNC: 8.5 G/DL — LOW (ref 13–17)
IMM GRANULOCYTES NFR BLD AUTO: 1.9 % — HIGH (ref 0–0.9)
LYMPHOCYTES # BLD AUTO: 2.28 K/UL — SIGNIFICANT CHANGE UP (ref 1–3.3)
LYMPHOCYTES # BLD AUTO: 48.5 % — HIGH (ref 13–44)
MCHC RBC-ENTMCNC: 29.8 PG — SIGNIFICANT CHANGE UP (ref 27–34)
MCHC RBC-ENTMCNC: 32.7 GM/DL — SIGNIFICANT CHANGE UP (ref 32–36)
MCV RBC AUTO: 91.2 FL — SIGNIFICANT CHANGE UP (ref 80–100)
MONOCYTES # BLD AUTO: 0.36 K/UL — SIGNIFICANT CHANGE UP (ref 0–0.9)
MONOCYTES NFR BLD AUTO: 7.7 % — SIGNIFICANT CHANGE UP (ref 2–14)
NEUTROPHILS # BLD AUTO: 1.83 K/UL — SIGNIFICANT CHANGE UP (ref 1.8–7.4)
NEUTROPHILS NFR BLD AUTO: 39 % — LOW (ref 43–77)
NRBC # BLD: 0 /100 WBCS — SIGNIFICANT CHANGE UP (ref 0–0)
OB PNL STL: NEGATIVE — SIGNIFICANT CHANGE UP
PLATELET # BLD AUTO: 183 K/UL — SIGNIFICANT CHANGE UP (ref 150–400)
POTASSIUM SERPL-MCNC: 3.8 MMOL/L — SIGNIFICANT CHANGE UP (ref 3.5–5.3)
POTASSIUM SERPL-SCNC: 3.8 MMOL/L — SIGNIFICANT CHANGE UP (ref 3.5–5.3)
RBC # BLD: 2.85 M/UL — LOW (ref 4.2–5.8)
RBC # FLD: 13.9 % — SIGNIFICANT CHANGE UP (ref 10.3–14.5)
SODIUM SERPL-SCNC: 141 MMOL/L — SIGNIFICANT CHANGE UP (ref 135–145)
WBC # BLD: 4.7 K/UL — SIGNIFICANT CHANGE UP (ref 3.8–10.5)
WBC # FLD AUTO: 4.7 K/UL — SIGNIFICANT CHANGE UP (ref 3.8–10.5)

## 2023-09-24 PROCEDURE — 99233 SBSQ HOSP IP/OBS HIGH 50: CPT

## 2023-09-24 PROCEDURE — 99232 SBSQ HOSP IP/OBS MODERATE 35: CPT

## 2023-09-24 RX ORDER — LACTULOSE 10 G/15ML
20 SOLUTION ORAL ONCE
Refills: 0 | Status: COMPLETED | OUTPATIENT
Start: 2023-09-24 | End: 2023-09-24

## 2023-09-24 RX ORDER — AMLODIPINE BESYLATE 2.5 MG/1
5 TABLET ORAL DAILY
Refills: 0 | Status: DISCONTINUED | OUTPATIENT
Start: 2023-09-24 | End: 2023-09-26

## 2023-09-24 RX ORDER — SENNA PLUS 8.6 MG/1
2 TABLET ORAL AT BEDTIME
Refills: 0 | Status: DISCONTINUED | OUTPATIENT
Start: 2023-09-24 | End: 2023-09-26

## 2023-09-24 RX ADMIN — Medication 1 MILLIGRAM(S): at 11:22

## 2023-09-24 RX ADMIN — AMLODIPINE BESYLATE 5 MILLIGRAM(S): 2.5 TABLET ORAL at 11:22

## 2023-09-24 RX ADMIN — SODIUM CHLORIDE 75 MILLILITER(S): 9 INJECTION, SOLUTION INTRAVENOUS at 16:31

## 2023-09-24 RX ADMIN — POLYETHYLENE GLYCOL 3350 17 GRAM(S): 17 POWDER, FOR SOLUTION ORAL at 11:23

## 2023-09-24 RX ADMIN — TAMSULOSIN HYDROCHLORIDE 0.4 MILLIGRAM(S): 0.4 CAPSULE ORAL at 21:03

## 2023-09-24 RX ADMIN — PANTOPRAZOLE SODIUM 40 MILLIGRAM(S): 20 TABLET, DELAYED RELEASE ORAL at 05:18

## 2023-09-24 RX ADMIN — HEPARIN SODIUM 5000 UNIT(S): 5000 INJECTION INTRAVENOUS; SUBCUTANEOUS at 17:31

## 2023-09-24 RX ADMIN — SODIUM CHLORIDE 75 MILLILITER(S): 9 INJECTION, SOLUTION INTRAVENOUS at 05:18

## 2023-09-24 RX ADMIN — PREGABALIN 1000 MICROGRAM(S): 225 CAPSULE ORAL at 11:23

## 2023-09-24 RX ADMIN — PANTOPRAZOLE SODIUM 40 MILLIGRAM(S): 20 TABLET, DELAYED RELEASE ORAL at 17:31

## 2023-09-24 RX ADMIN — LIDOCAINE 1 PATCH: 4 CREAM TOPICAL at 05:17

## 2023-09-24 RX ADMIN — HEPARIN SODIUM 5000 UNIT(S): 5000 INJECTION INTRAVENOUS; SUBCUTANEOUS at 05:18

## 2023-09-24 RX ADMIN — SENNA PLUS 2 TABLET(S): 8.6 TABLET ORAL at 21:03

## 2023-09-24 RX ADMIN — LACTULOSE 20 GRAM(S): 10 SOLUTION ORAL at 11:22

## 2023-09-24 RX ADMIN — Medication 100 MILLIGRAM(S): at 11:23

## 2023-09-24 NOTE — PROGRESS NOTE ADULT - ASSESSMENT
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 060A   Wedge compression fracture of T7-T8 vertebra, initial encounter for closed fracture  D63.8         Anemia chronic disease  N17.8         WILY vs CKD  D63.1         Anemia CKD  C90.00       Suspected MM  D51.0        B12 def  D52.0        Folate def  K26.9        Duodenal ulcer    74yo [place] M brought back home from [facility] after pt noted to have decline in health over summer 2023.   PMH HTN and BPH, presents with fatigue, anorexia, and weight loss x 1 month, admitted for new gastric mass, anemia and WILY.   Workup showed urine bence jnes protein and elevated light chains. Concerning for MM  Concurrently with 4xm submucosal gastric lesion.     Gastric tumor. ? GIST  Sx with fatigue, weakness, weight loss, decreased appetite x1 month   weight loss/malnutrition   Exophytic lesion arising from the gastric fundus may represent a solid mass such as a GIST.   Differential includes gastric diverticulum, which is considered less likely.     s/p  upper gastrointestinal endoscopy:  large sub mucosal lesion noted in the gastric cardia  GE jn noted at 36cm from the incisors  Clean based duodenal ulcer  patient is to follow up with Dr. Breonna Lombardi interventional GI at [facility] for EUS/Bx    Duodenal ulcer  monitor for bleeding    Compression Fx  Age indeterminate compression fracture deformity involving the T8 vertebral body. Correlate for point tenderness.  ? osteoporosis vs from MM    Multifactorial anemia  ·	Anemia chronic disease  ·	Anemia CKD  ·	Anemia b12 def, Anemia folate def  ·	Ferritin adequate      RECOMMENDATIONS    Gastric tumor / ?GIST  Reg diet   proton pump inhibitor orally bid  outpt EGD with EUS planned.   patient is to follow up with Dr. Breonna Lombardi interventional GI at [facility] for EUS/Bx  Surgery followup post eval.     Multifactorial Anemia  Anemia. B12 def, Folate def, Anemia CKD, anemia chronic disease  ·	Start IM B12  ·	Star PO Folate  ·	Followup  ESR, CRP  ·	Follow Cr.   Follow CBC, no indication for transfusion at current time.   ·	Transfuse PRBC as clinically indicated.   ·	Transfuse PRBC if Hgb <7.0 or if symptomatic.     WILY / MM / Bence Ruelas protein - elevated light chains.  concderned over light chain myeloma    Compression Fx  Check VitD  Consider DEXA  Check skeletal survey  DVT Prophylaxis.   SQ Lovenox or SQ heparin.   Cr continues to improve  CBC stable  to followup on bone marrow asp/bx pathology    dtr Silvia  [phone] cell  [phone] home    PMD Dr Tramaine Barnes [[facility]]  [phone]

## 2023-09-24 NOTE — PROGRESS NOTE ADULT - ASSESSMENT
74 yo male with hx of HTN of HTN and BPH, presents with fatigue, anorexia, and weight loss x 1 month, admitted for new gastric mass, anemia and WILY.     Problem/Plan - 1:  ·  Problem: Gastric tumor.   ·  Plan: Pt presents with fatigue, weakness, weight loss, decreased appetite x1 month   - CT Chest, Abd/Pelvis with IV contrast: Exophytic lesion arising from the gastric fundus measuring 4.1 x 5.3 x 5.2 cm, increased in size from CT 8/16/2021 where it measured 3.0 x 3.4 x 3.3 cm.. This may represent a solid mass such as a GIST. Differential includes gastric diverticulum, which is considered less likely.   - lipase: 106  - Nutrition consult   - Pt with new anemia, worsening kidney function, compression fx, Abnormal SPEP +Bence Ruelas.  Pt. s/p bone marrow biopsy on 9/21.  Check pathology and flow cytometry.  - GI Dr. Hua consulted  - Heme/onc Dr. Toth consulted  Patient s/p EGD ulcer found with lesion  Plan is for OUTPATIENT EUS and eventual outpatient Surg/onc  at this time monitor hb (BASELINE of 12--> now 8.2) with borderline "OK" b12  protonix bid, cyanocobalamin 1000mcg IM daily--> maintain hb > 8.     Problem/Plan - 2:  ·  Problem: Anemia.   ·  Plan: Hgb/Hct 8.9/26.8 on admission, H/H 14.5/41.7 in Jan 2022, JAN 2023 12   - anemia likely in the setting of  malignancy  and wily  - No s/s bleeding at this time   - Heme/onc Dr. Toth consulted  - s/p 1unit PRBC transfusion on 9/21 with increase hbg to 8.3  - continue folic acid and b12     Problem/Plan - 3:  ·  Problem: WILY (acute kidney injury).   ·  Plan: - BUN/Cr 64/5.70 on admission, BUN/Cr 21/1.1 in Jan 2022, jan 2023 GFR 63  - likely prerenal given decrease PO intake over the last month, no signs of hydronephrosis on CT, no urinary sx.  Also concern for possible multiple myeloma.  - Pt was given IV contrast in the ED for CT scan  - U/A: Moderate blood, Nitrite neg, Leuk Esterase Neg, occasional bacteria, squamous cells present, so s/s bleeding, f/u UCx   - Hold home lisinopril-HCTZ   - Avoid nephrotoxic medications  - Continue 1/2NS@75cc/hr  - Renal function improving  - nephro consulted.        Problem/Plan - 4:  ·  Problem: Compression fracture of spine.   ·  Plan: CT chest with IV contrast: Age indeterminate compression fracture deformity involving the T8 vertebral body.   - Pt with no point tenderness on exam  - Tylenol PRN for pain  - Lidocaine patch   - PT consult.     Problem/Plan - 5:  ·  Problem: HTN (hypertension).   ·  Plan: Chronic   - Stable  - Holding home amlodipine 10mg QD with hold parameters   - Will HOLD home lisinopril-HCTZ 10-12.5mg in the setting of WILY, may restart once kidney function improves   - Will start Norvasc 5mg PO daily      Problem/Plan - 6:  ·  Problem: BPH (benign prostatic hyperplasia).   ·  Plan: Chronic   - Continue home tamsulosin 0.4mg QD.     Problem/Plan - 7:  ·  Problem: Abnormal finding on imaging.   ·  Plan: CT A/P: Small hypoattenuating lesion at the gallbladder fossa, incompletely characterized. Hypoattenuating left renal lesions are stable compared with the prior CT 8/16/2021, likely benign cysts  - F/u out patient upon discharge.     Problem/Plan - 8:  ·  Problem: Need for prophylactic measure.   ·  Plan: DVT PPX: heparin 5000U q12     #Dispo  - PT consult.    #constipation:  continue miralax 17gm PO daily.  Add senna 2 tabs PO QHS.  Lactulose 20gm PO once.

## 2023-09-24 NOTE — PROGRESS NOTE ADULT - ASSESSMENT
74 y/o M with HTN and BPH presented with generalized fatigue, anorexia, and weight loss for the past month.  Was recently diagnosed with compression fracture and has not been able to do his ADL's.  Was recently back from  with a dx of anemia and advised to get a transfusion but patient and daughter refused.  He is back to [place] for further w/u.  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  Labs revealed WILY    Cardiac Optimization/HTN  - CT abdomen/pelvis showed a lesion arising from the gastric fundus suspicious of GIST  - s/p EGD large sub mucosal lesion noted in the gastric cardia, GE jn noted at 36cm from the incisors clean based duodenal ulcer, GI following   - Tolerated procedure well, cardiac status optimal post operatively   - H/H 8.5/26 stable, continue to trend, transfuse per primary  - hem/onc following, workup ongoing, s/p bone marrow bx, awaiting for path     - Had a normal Stress test in 2/2022 in our office and had a reportedly normal TTE in the [place] last year with his PCP  - EKG: NSR with Q waves in leads ll and lll, unchanged from previous.   - No anginal complaints  - No evidence of any active ischemia     - Holding Lisinopril/HCTZ  in the setting of WILY,    - Creatinine continues to improve now 2.4 <--5.70, renal following  - on IV hydration  - No evidence of any meaningful volume overload  - No evidence of significant arrhythmia     - BP, HR stable and controlled   - Monitor and replete Lytes. Keep K > 4 and Mg > 2  - Will continue to follow.    Sylvia Ward [facility]  Nurse Practitioner - Cardiology   call TEAMS

## 2023-09-24 NOTE — PROGRESS NOTE ADULT - ASSESSMENT
gastric mass/GIST  weight loss/malnutrition     s/p  upper gastrointestinal endoscopy:  large sub mucosal lesion noted in the gastric cardia  GE jn noted at 36cm from the incisors  clean based duodenal ulcer    rec:   proton pump inhibitor orally bid  patient is to follow up with Dr. Breonna Lombardi interventional GI at [facility] for EUS/Bx  rec trial of Remeron to boost appetite  prbc transfusions per heme/onc recs; input appreciated  f/u BM bx

## 2023-09-25 LAB
ANION GAP SERPL CALC-SCNC: 5 MMOL/L — SIGNIFICANT CHANGE UP (ref 5–17)
BASOPHILS # BLD AUTO: 0.03 K/UL — SIGNIFICANT CHANGE UP (ref 0–0.2)
BASOPHILS NFR BLD AUTO: 0.6 % — SIGNIFICANT CHANGE UP (ref 0–2)
BUN SERPL-MCNC: 30 MG/DL — HIGH (ref 7–23)
CALCIUM SERPL-MCNC: 10.3 MG/DL — HIGH (ref 8.5–10.1)
CHLORIDE SERPL-SCNC: 110 MMOL/L — HIGH (ref 96–108)
CO2 SERPL-SCNC: 27 MMOL/L — SIGNIFICANT CHANGE UP (ref 22–31)
CREAT SERPL-MCNC: 2.3 MG/DL — HIGH (ref 0.5–1.3)
EGFR: 29 ML/MIN/1.73M2 — LOW
EOSINOPHIL # BLD AUTO: 0.11 K/UL — SIGNIFICANT CHANGE UP (ref 0–0.5)
EOSINOPHIL NFR BLD AUTO: 2.2 % — SIGNIFICANT CHANGE UP (ref 0–6)
GLUCOSE SERPL-MCNC: 94 MG/DL — SIGNIFICANT CHANGE UP (ref 70–99)
HCT VFR BLD CALC: 25.5 % — LOW (ref 39–50)
HGB BLD-MCNC: 8.4 G/DL — LOW (ref 13–17)
IMM GRANULOCYTES NFR BLD AUTO: 2 % — HIGH (ref 0–0.9)
LYMPHOCYTES # BLD AUTO: 2.33 K/UL — SIGNIFICANT CHANGE UP (ref 1–3.3)
LYMPHOCYTES # BLD AUTO: 45.6 % — HIGH (ref 13–44)
MAGNESIUM SERPL-MCNC: 1.6 MG/DL — SIGNIFICANT CHANGE UP (ref 1.6–2.6)
MCHC RBC-ENTMCNC: 29.9 PG — SIGNIFICANT CHANGE UP (ref 27–34)
MCHC RBC-ENTMCNC: 32.9 GM/DL — SIGNIFICANT CHANGE UP (ref 32–36)
MCV RBC AUTO: 90.7 FL — SIGNIFICANT CHANGE UP (ref 80–100)
MONOCYTES # BLD AUTO: 0.36 K/UL — SIGNIFICANT CHANGE UP (ref 0–0.9)
MONOCYTES NFR BLD AUTO: 7 % — SIGNIFICANT CHANGE UP (ref 2–14)
NEUTROPHILS # BLD AUTO: 2.18 K/UL — SIGNIFICANT CHANGE UP (ref 1.8–7.4)
NEUTROPHILS NFR BLD AUTO: 42.6 % — LOW (ref 43–77)
NRBC # BLD: 0 /100 WBCS — SIGNIFICANT CHANGE UP (ref 0–0)
PHOSPHATE SERPL-MCNC: 3.8 MG/DL — SIGNIFICANT CHANGE UP (ref 2.5–4.5)
PLATELET # BLD AUTO: 185 K/UL — SIGNIFICANT CHANGE UP (ref 150–400)
POTASSIUM SERPL-MCNC: 4.1 MMOL/L — SIGNIFICANT CHANGE UP (ref 3.5–5.3)
POTASSIUM SERPL-SCNC: 4.1 MMOL/L — SIGNIFICANT CHANGE UP (ref 3.5–5.3)
RBC # BLD: 2.81 M/UL — LOW (ref 4.2–5.8)
RBC # FLD: 13.9 % — SIGNIFICANT CHANGE UP (ref 10.3–14.5)
SODIUM SERPL-SCNC: 142 MMOL/L — SIGNIFICANT CHANGE UP (ref 135–145)
WBC # BLD: 5.11 K/UL — SIGNIFICANT CHANGE UP (ref 3.8–10.5)
WBC # FLD AUTO: 5.11 K/UL — SIGNIFICANT CHANGE UP (ref 3.8–10.5)

## 2023-09-25 PROCEDURE — 99232 SBSQ HOSP IP/OBS MODERATE 35: CPT

## 2023-09-25 RX ADMIN — HEPARIN SODIUM 5000 UNIT(S): 5000 INJECTION INTRAVENOUS; SUBCUTANEOUS at 05:06

## 2023-09-25 RX ADMIN — Medication 1 MILLIGRAM(S): at 12:42

## 2023-09-25 RX ADMIN — SODIUM CHLORIDE 75 MILLILITER(S): 9 INJECTION, SOLUTION INTRAVENOUS at 17:07

## 2023-09-25 RX ADMIN — PANTOPRAZOLE SODIUM 40 MILLIGRAM(S): 20 TABLET, DELAYED RELEASE ORAL at 17:07

## 2023-09-25 RX ADMIN — HEPARIN SODIUM 5000 UNIT(S): 5000 INJECTION INTRAVENOUS; SUBCUTANEOUS at 17:07

## 2023-09-25 RX ADMIN — TAMSULOSIN HYDROCHLORIDE 0.4 MILLIGRAM(S): 0.4 CAPSULE ORAL at 22:03

## 2023-09-25 RX ADMIN — SODIUM CHLORIDE 75 MILLILITER(S): 9 INJECTION, SOLUTION INTRAVENOUS at 05:06

## 2023-09-25 RX ADMIN — PANTOPRAZOLE SODIUM 40 MILLIGRAM(S): 20 TABLET, DELAYED RELEASE ORAL at 05:06

## 2023-09-25 RX ADMIN — AMLODIPINE BESYLATE 5 MILLIGRAM(S): 2.5 TABLET ORAL at 05:06

## 2023-09-25 RX ADMIN — Medication 100 MILLIGRAM(S): at 12:41

## 2023-09-25 RX ADMIN — SENNA PLUS 2 TABLET(S): 8.6 TABLET ORAL at 22:03

## 2023-09-25 RX ADMIN — PREGABALIN 1000 MICROGRAM(S): 225 CAPSULE ORAL at 12:42

## 2023-09-25 NOTE — PROGRESS NOTE ADULT - NUTRITIONAL ASSESSMENT
This patient has been assessed with a concern for Malnutrition and has been determined to have a diagnosis/diagnoses of Severe protein-calorie malnutrition.    This patient is being managed with:   Diet DASH/TLC-  Sodium & Cholesterol Restricted  Entered: Sep 17 2023  5:58PM  
This patient has been assessed with a concern for Malnutrition and has been determined to have a diagnosis/diagnoses of Severe protein-calorie malnutrition.    This patient is being managed with:   Diet NPO after Midnight-     NPO Start Date: 20-Sep-2023   NPO Start Time: 23:59  Entered: Sep 20 2023 11:28AM    Diet DASH/TLC-  Sodium & Cholesterol Restricted  Entered: Sep 17 2023  5:58PM  
This patient has been assessed with a concern for Malnutrition and has been determined to have a diagnosis/diagnoses of Severe protein-calorie malnutrition.    This patient is being managed with:   Diet NPO after Midnight-     NPO Start Date: 20-Sep-2023   NPO Start Time: 23:59  Entered: Sep 20 2023 11:28AM    Diet DASH/TLC-  Sodium & Cholesterol Restricted  Entered: Sep 17 2023  5:58PM  
This patient has been assessed with a concern for Malnutrition and has been determined to have a diagnosis/diagnoses of Severe protein-calorie malnutrition.    This patient is being managed with:   Diet DASH/TLC-  Sodium & Cholesterol Restricted  Entered: Sep 17 2023  5:58PM

## 2023-09-25 NOTE — PROGRESS NOTE ADULT - ASSESSMENT
72 y/o M with HTN and BPH presented with generalized fatigue, anorexia, and weight loss for the past month.  Was recently diagnosed with compression fracture and has not been able to do his ADL's.  Was recently back from  with a dx of anemia and advised to get a transfusion but patient and daughter refused.  He is back to [place] for further w/u.  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  Labs revealed WILY    Cardiac Optimization/HTN  - CT abdomen/pelvis showed a lesion arising from the gastric fundus suspicious of GIST  - s/p EGD large sub mucosal lesion noted in the gastric cardia, GE jn noted at 36cm from the incisors clean based duodenal ulcer, GI following   - Tolerated procedure well, cardiac status optimal post operatively     - H/H 8.4/25 stable, continue to trend, transfuse per primary  - hem/onc following, workup ongoing, s/p bone marrow bx, awaiting for path     - Had a normal Stress test in 2/2022 in our office and had a reportedly normal TTE in the [place] last year with his PCP  - EKG: NSR with Q waves in leads ll and lll, unchanged from previous.   - No anginal complaints  - No evidence of any active ischemia     - Holding Lisinopril/HCTZ  in the setting of WILY,    - Creatinine continues to improve now 2.3 <--5.70, renal following  - on IV hydration  - No evidence of any meaningful volume overload  - No evidence of significant arrhythmia     - BP, HR stable and controlled   - Monitor and replete Lytes. Keep K > 4 and Mg > 2  - Will continue to follow.    Sylvia Ward [facility]  Nurse Practitioner - Cardiology   call TEAMS

## 2023-09-25 NOTE — PROGRESS NOTE ADULT - ASSESSMENT
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 060A   Wedge compression fracture of T7-T8 vertebra, initial encounter for closed fracture  D63.8         Anemia chronic disease  N17.8         WILY vs CKD  D63.1         Anemia CKD  C90.00       Suspected MM  D51.0        B12 def  D52.0        Folate def  K26.9        Duodenal ulcer    74yo [place] M brought back home from [facility] after pt noted to have decline in health over summer 2023.   PMH HTN and BPH, presents with fatigue, anorexia, and weight loss x 1 month, admitted for new gastric mass, anemia and WILY.   Workup showed urine bence jnes protein and elevated light chains. Concerning for MM  Concurrently with 4xm submucosal gastric lesion.     Gastric tumor. ? GIST  Sx with fatigue, weakness, weight loss, decreased appetite x1 month   weight loss/malnutrition   Exophytic lesion arising from the gastric fundus may represent a solid mass such as a GIST.   Differential includes gastric diverticulum, which is considered less likely.     s/p  upper gastrointestinal endoscopy:  large sub mucosal lesion noted in the gastric cardia  GE jn noted at 36cm from the incisors  Clean based duodenal ulcer  patient is to follow up with Dr. Breonna Lombardi interventional GI at [facility] for EUS/Bx    Duodenal ulcer  monitor for bleeding    Compression Fx  Age indeterminate compression fracture deformity involving the T8 vertebral body. Correlate for point tenderness.  ? osteoporosis vs from MM    MM vs Light chain disease  skeletal survey with calvarial lytic lesion  WILY / MM / Bence Ruelas protein - elevated light chains.    SIFE pending  Concern for MM vs light chain disease      Multifactorial anemia  ·	Anemia chronic disease  ·	Anemia CKD  ·	Anemia b12 def, Anemia folate def  ·	Ferritin adequate      RECOMMENDATIONS    Gastric tumor / ?GIST  Reg diet   proton pump inhibitor orally bid  outpt EGD with EUS planned.   patient is to follow up with Dr. Breonna Lombardi interventional GI at [facility] for EUS/Bx  Surgery followup post eval.     Multifactorial Anemia  Anemia. B12 def, Folate def, Anemia CKD, anemia chronic disease  ·	Start IM B12  ·	Star PO Folate  ·	Followup  ESR, CRP  ·	Follow Cr.   Follow CBC, no indication for transfusion at current time.   ·	Transfuse PRBC as clinically indicated.   ·	Transfuse PRBC if Hgb <7.0 or if symptomatic.     WILY / MM / Bence Ruelas protein - elevated light chains.    Concern for MM vs light chain disease  Follow UIFE and SIFE    Compression Fx  Check VitD  Consider DEXA    DVT Prophylaxis.   SQ Lovenox or SQ heparin.   Cr continues to improve Cr 5.70 ==>Cr 2.3  CBC stable  to followup on bone marrow asp/bx pathology    dtr Silvia  [phone] cell  [phone] home    PMD Dr Tramaine Barnes [[facility]]  [phone]

## 2023-09-25 NOTE — PROGRESS NOTE ADULT - ASSESSMENT
WILY, ? Baseline CKD 3: Prerenal azotemia, ARB Rx  Nephrotic range proteinuria: DDx membranous nephropathy  Anemia, Abnormal SPEP, ? MM  Hypertension  Gastric mass    Improving renal indices. To continue current meds. Encourage PO intake as tolerated. Hematology follow up.   Biopsy results pending. Avoid nephrotoxic meds as possible. D/c planning. D/w patient daughter regarding need for out patient nephrology follow up.   Avoid nephrotoxic meds as possible. Family educated on avoiding NSAIDs as out pt.

## 2023-09-25 NOTE — PROGRESS NOTE ADULT - ASSESSMENT
74 yo male with hx of HTN of HTN and BPH, presents with fatigue, anorexia, and weight loss x 1 month, admitted for new gastric mass, anemia and WILY.     Problem/Plan - 1:  ·  Problem: Gastric tumor.   ·  Plan: Pt presents with fatigue, weakness, weight loss, decreased appetite x1 month   - CT Chest, Abd/Pelvis with IV contrast: Exophytic lesion arising from the gastric fundus measuring 4.1 x 5.3 x 5.2 cm, increased in size from CT 8/16/2021 where it measured 3.0 x 3.4 x 3.3 cm.. This may represent a solid mass such as a GIST. Differential includes gastric diverticulum, which is considered less likely.   - lipase: 106  - Nutrition consult   - Pt with new anemia, worsening kidney function, compression fx, Abnormal SPEP +Bence Ruelas.  Pt. s/p bone marrow biopsy on 9/21.  Check pathology and flow cytometry.  - GI Dr. Hua consulted  - Heme/onc Dr. Toth consulted  Patient s/p EGD ulcer found with lesion  Plan is for OUTPATIENT EUS and eventual outpatient Surg/onc  at this time monitor hb (BASELINE of 12--> now 8.2) with borderline "OK" b12  protonix bid, cyanocobalamin 1000mcg IM daily--> maintain hb > 8.     Problem/Plan - 2:  ·  Problem: Anemia.   ·  Plan: Hgb/Hct 8.9/26.8 on admission, H/H 14.5/41.7 in Jan 2022, JAN 2023 12   - anemia likely in the setting of  malignancy  and wily  - No s/s bleeding at this time   - Heme/onc Dr. Toth consulted  - s/p 1unit PRBC transfusion on 9/21 with increase hbg to 8.3  - continue folic acid and b12     Problem/Plan - 3:  ·  Problem: WILY (acute kidney injury).   ·  Plan: - BUN/Cr 64/5.70 on admission, BUN/Cr 21/1.1 in Jan 2022, jan 2023 GFR 63  - likely prerenal given decrease PO intake over the last month, no signs of hydronephrosis on CT, no urinary sx.  Also concern for possible multiple myeloma.  - Pt was given IV contrast in the ED for CT scan  - U/A: Moderate blood, Nitrite neg, Leuk Esterase Neg, occasional bacteria, squamous cells present, so s/s bleeding, f/u UCx   - Hold home lisinopril-HCTZ   - Avoid nephrotoxic medications  - Continue 1/2NS@75cc/hr  - Renal function improving  - nephro consulted.        Problem/Plan - 4:  ·  Problem: Compression fracture of spine.   ·  Plan: CT chest with IV contrast: Age indeterminate compression fracture deformity involving the T8 vertebral body.   - Pt with no point tenderness on exam  - Tylenol PRN for pain  - Lidocaine patch   - PT  adv home PT     Problem/Plan - 5:  ·  Problem: HTN (hypertension).   ·  Plan: Chronic   - Stable  - Will HOLD home lisinopril-HCTZ 10-12.5mg in the setting of WILY,  - Will c/w Norvasc 5mg PO daily      Problem/Plan - 6:  ·  Problem: BPH (benign prostatic hyperplasia).   ·  Plan: Chronic   - Continue home tamsulosin 0.4mg QD.     Problem/Plan - 7:  ·  Problem: Abnormal finding on imaging.   ·  Plan: CT A/P: Small hypoattenuating lesion at the gallbladder fossa, incompletely characterized. Hypoattenuating left renal lesions are stable compared with the prior CT 8/16/2021, likely benign cysts  - F/u out patient upon discharge.     Problem/Plan - 8:  ·  Problem: Need for prophylactic measure.   ·  Plan: DVT PPX: heparin 5000U q12     #Dispo  - PT consult.    #constipation:  continue miralax 17gm PO daily.  Add senna 2 tabs PO QHS.  Lactulose 20gm PO once.

## 2023-09-25 NOTE — CASE MANAGEMENT PROGRESS NOTE - NSCMPROGRESSNOTE_GEN_ALL_CORE
Spoke in great length with the daughter about the transition home plan, as well as the son at the bedside. They are aware as well as the pt that they will get a script for PT and a list of choices to call for home PT. I spoke with the hospitalist who was made aware that CM teams needs a script for PT eval in the home. The pt's son or daughter will transport the pt home. No other skilled needs anticipated.

## 2023-09-25 NOTE — PROGRESS NOTE ADULT - ASSESSMENT
gastric mass/GIST  weight loss/malnutrition     s/p  upper gastrointestinal endoscopy:  large sub mucosal lesion noted in the gastric cardia  GE jn noted at 36cm from the incisors  clean based duodenal ulcer    rec:   proton pump inhibitor orally bid  patient is to follow up with Dr. Breonna Lombardi interventional GI at [facility] for EUS/Bx  consider trial of Remeron to boost appetite  prbc transfusions per heme/onc recs; input appreciated  f/u BM bx

## 2023-09-26 VITALS
DIASTOLIC BLOOD PRESSURE: 69 MMHG | SYSTOLIC BLOOD PRESSURE: 134 MMHG | TEMPERATURE: 98 F | RESPIRATION RATE: 17 BRPM | HEART RATE: 84 BPM | OXYGEN SATURATION: 94 %

## 2023-09-26 LAB
% ALBUMIN: 59.9 % — SIGNIFICANT CHANGE UP
% ALPHA 1: 4.3 % — SIGNIFICANT CHANGE UP
% ALPHA 2: 17.1 % — SIGNIFICANT CHANGE UP
% BETA: 8.4 % — SIGNIFICANT CHANGE UP
% GAMMA: 10.3 % — SIGNIFICANT CHANGE UP
% M SPIKE: SIGNIFICANT CHANGE UP %
ALBUMIN SERPL ELPH-MCNC: 3.6 G/DL — SIGNIFICANT CHANGE UP (ref 3.6–5.5)
ALBUMIN/GLOB SERPL ELPH: 1.5 RATIO — SIGNIFICANT CHANGE UP
ALPHA1 GLOB SERPL ELPH-MCNC: 0.3 G/DL — SIGNIFICANT CHANGE UP (ref 0.1–0.4)
ALPHA2 GLOB SERPL ELPH-MCNC: 1 G/DL — SIGNIFICANT CHANGE UP (ref 0.5–1)
B-GLOBULIN SERPL ELPH-MCNC: 0.5 G/DL — SIGNIFICANT CHANGE UP (ref 0.5–1)
GAMMA GLOBULIN: 0.6 G/DL — SIGNIFICANT CHANGE UP (ref 0.6–1.6)
M-SPIKE: SIGNIFICANT CHANGE UP G/DL (ref 0–0)
PROT PATTERN SERPL ELPH-IMP: SIGNIFICANT CHANGE UP

## 2023-09-26 PROCEDURE — 99232 SBSQ HOSP IP/OBS MODERATE 35: CPT

## 2023-09-26 PROCEDURE — 99239 HOSP IP/OBS DSCHRG MGMT >30: CPT

## 2023-09-26 RX ORDER — LIDOCAINE 4 G/100G
1 CREAM TOPICAL
Qty: 10 | Refills: 0
Start: 2023-09-26 | End: 2023-10-05

## 2023-09-26 RX ORDER — FOLIC ACID 0.8 MG
1 TABLET ORAL
Qty: 30 | Refills: 0
Start: 2023-09-26 | End: 2023-10-25

## 2023-09-26 RX ORDER — POLYETHYLENE GLYCOL 3350 17 G/17G
17 POWDER, FOR SOLUTION ORAL
Qty: 510 | Refills: 0
Start: 2023-09-26 | End: 2023-10-25

## 2023-09-26 RX ORDER — PREGABALIN 225 MG/1
1 CAPSULE ORAL
Qty: 30 | Refills: 0
Start: 2023-09-26 | End: 2023-10-25

## 2023-09-26 RX ORDER — PANTOPRAZOLE SODIUM 20 MG/1
1 TABLET, DELAYED RELEASE ORAL
Qty: 60 | Refills: 0
Start: 2023-09-26 | End: 2023-10-25

## 2023-09-26 RX ADMIN — PANTOPRAZOLE SODIUM 40 MILLIGRAM(S): 20 TABLET, DELAYED RELEASE ORAL at 05:09

## 2023-09-26 RX ADMIN — HEPARIN SODIUM 5000 UNIT(S): 5000 INJECTION INTRAVENOUS; SUBCUTANEOUS at 05:08

## 2023-09-26 RX ADMIN — POLYETHYLENE GLYCOL 3350 17 GRAM(S): 17 POWDER, FOR SOLUTION ORAL at 11:54

## 2023-09-26 RX ADMIN — AMLODIPINE BESYLATE 5 MILLIGRAM(S): 2.5 TABLET ORAL at 05:08

## 2023-09-26 RX ADMIN — Medication 100 MILLIGRAM(S): at 11:54

## 2023-09-26 RX ADMIN — Medication 1 MILLIGRAM(S): at 11:53

## 2023-09-26 RX ADMIN — PREGABALIN 1000 MICROGRAM(S): 225 CAPSULE ORAL at 11:54

## 2023-09-26 RX ADMIN — SODIUM CHLORIDE 75 MILLILITER(S): 9 INJECTION, SOLUTION INTRAVENOUS at 05:10

## 2023-09-26 NOTE — PROGRESS NOTE ADULT - REASON FOR ADMISSION
anemia, gastric mass concerning for malignancy, casie

## 2023-09-26 NOTE — PROGRESS NOTE ADULT - PROVIDER SPECIALTY LIST ADULT
Gastroenterology
Heme/Onc
Hospitalist
Hospitalist
Nephrology
Cardiology
Gastroenterology
Hospitalist
Nephrology
Cardiology
Gastroenterology
Gastroenterology
Heme/Onc
Heme/Onc
Hospitalist
Nephrology
Nephrology
Cardiology
Gastroenterology
Heme/Onc
Hospitalist
Nephrology
Hospitalist
Internal Medicine
Internal Medicine

## 2023-09-26 NOTE — PROGRESS NOTE ADULT - ASSESSMENT
72 y/o M with HTN and BPH presented with generalized fatigue, anorexia, and weight loss for the past month.  Was recently diagnosed with compression fracture and has not been able to do his ADL's.  Was recently back from  with a dx of anemia and advised to get a transfusion but patient and daughter refused.  He is back to [place] for further w/u.  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  Labs revealed WILY    Cardiac Optimization/HTN  - CT abdomen/pelvis showed a lesion arising from the gastric fundus suspicious of GIST  - s/p EGD large sub mucosal lesion noted in the gastric cardia, GE jn noted at 36cm from the incisors clean based duodenal ulcer, GI following   - Tolerated procedure well, cardiac status optimal post operatively     - H/H 8.4/25 stable, continue to trend, transfuse per primary  - hem/onc following, workup ongoing, s/p bone marrow bx, awaiting for path     - EKG: NSR with Q waves in leads ll and lll, unchanged from previous.   - Had a normal Stress test (2/2022) in our office   - No anginal complaints  - No evidence of any active ischemia     - BP, HR stable and controlled   - continue CCB  - Holding Lisinopril/HCTZ  in the setting of WILY,  creat improving 2.3 <--5.70, pending today's level, renal following  - Monitor and replete Lytes. Keep K > 4 and Mg > 2    - on IV hydration  - No evidence of any meaningful volume overload  - normal TTE in the [place] last year with his PCP    - DC planning per primary   - Will continue to follow.    [facility]ylvia Ward [facility]  Nurse Practitioner - Cardiology   call TEAMS

## 2023-09-26 NOTE — CHART NOTE - NSCHARTNOTEFT_GEN_A_CORE
Assessment: patient seen for malnutrition follow up   73y old  Male who presents with a chief complaint of anemia, gastric mass concerning for malignancy, casie   patient seen with son in room translating. patient with improved PO intake over last couple of days. taking all of meals per family  patient complains on no taste. offered ices, jello at this time  previous refusing supplements   + BM yesterday       Factors impacting intake: [ ] none [ ] nausea  [ ] vomiting [ ] diarrhea [ ] constipation  [ ]chewing problems [ ] swallowing issues  [x ] other: complains of no taste    Diet Prescription: Diet, DASH/TLC:   Sodium & Cholesterol Restricted (09-17-23 @ 18:00)    Intake: up to 100% now per flow sheet and family     Current Weight: Weight (kg): 77.1 (09-21 @ 09:11)      Pertinent Medications: MEDICATIONS  (STANDING):  allopurinol 100 milliGRAM(s) Oral daily  amLODIPine   Tablet 5 milliGRAM(s) Oral daily  cyanocobalamin Injectable 1000 MICROGram(s) IntraMuscular daily  folic acid 1 milliGRAM(s) Oral daily  heparin   Injectable 5000 Unit(s) SubCutaneous every 12 hours  lidocaine   4% Patch 1 Patch Transdermal every 24 hours  pantoprazole    Tablet 40 milliGRAM(s) Oral every 12 hours  polyethylene glycol 3350 17 Gram(s) Oral daily  senna 2 Tablet(s) Oral at bedtime  sodium chloride 0.45%. 1000 milliLiter(s) (75 mL/Hr) IV Continuous <Continuous>  tamsulosin 0.4 milliGRAM(s) Oral at bedtime    MEDICATIONS  (PRN):  acetaminophen     Tablet .. 650 milliGRAM(s) Oral every 6 hours PRN Mild Pain (1 - 3)    Pertinent Labs: BUM 30 creat 2.3   Skin: no pressure injury no edema    Estimated Needs:   [ ] no change since previous assessment  [ ] recalculated:     Previous Nutrition Diagnosis:   [ ] Inadequate Energy Intake [ ]Inadequate Oral Intake [ ] Excessive Energy Intake   [ ] Underweight [ ] Increased Nutrient Needs [ ] Overweight/Obesity   [ ] Altered GI Function [ ] Unintended Weight Loss [ ] Food & Nutrition Related Knowledge Deficit [x ] Malnutrition acute severe     Nutrition Diagnosis is [x ] ongoing  [ ] resolved [ ] not applicable     New Nutrition Diagnosis: [ x] not applicable       Interventions:   Recommend  [ ] Change Diet To:  [ ] Nutrition Supplement  [ ] Nutrition Support  [x ] Other: consider liberalize to regular diet, can hold off on remereon with improved PO now, continue bowel regiment     Monitoring and Evaluation:   [x ] PO intake [ x ] Tolerance to diet prescription [ x ] weights [ x ] labs[ x ] follow up per protocol  [ ] other:
Assessment: 74 y/o male adm with ARF, gastric mass, anemia, weakness. PMH BPH, HTN. s/p upper endo; large submucosal lesion noted in the gastric cardia.   Attempted to visit with pt this morning. Spoke with RN. Pt has been NPO after MN, currently down for a bone biopsy. GI following and rx possibly starting remeron to help improve pt's appetite. At previous visit pt declined nutritional supplements and offered no food preferences/requests at this time.     Factors impacting intake: [ ] none [ ] nausea  [ ] vomiting [ ] diarrhea [ ] constipation  [ ]chewing problems [ ] swallowing issues  [x ] other: currently NPO for bone biopsy.     Diet Presciption: Diet, NPO after Midnight:      NPO Start Date: 20-Sep-2023,   NPO Start Time: 23:59 (09-20-23 @ 11:28)  Diet, DASH/TLC:   Sodium & Cholesterol Restricted (09-17-23 @ 18:00)    Intake: unsure/ currently NPO    Current Weight: Weight (kg): 77.1 (09-21 @ 09:11)  % Weight Change    Pertinent Medications: MEDICATIONS  (STANDING):  allopurinol 100 milliGRAM(s) Oral daily  cyanocobalamin Injectable 1000 MICROGram(s) IntraMuscular daily  folic acid 1 milliGRAM(s) Oral daily  lidocaine   4% Patch 1 Patch Transdermal every 24 hours  pantoprazole    Tablet 40 milliGRAM(s) Oral every 12 hours  polyethylene glycol 3350 17 Gram(s) Oral daily  sodium chloride 0.45%. 1000 milliLiter(s) (75 mL/Hr) IV Continuous <Continuous>  tamsulosin 0.4 milliGRAM(s) Oral at bedtime    MEDICATIONS  (PRN):  acetaminophen     Tablet .. 650 milliGRAM(s) Oral every 6 hours PRN Mild Pain (1 - 3)    Pertinent Labs: 09-21 Na142 mmol/L Glu 97 mg/dL K+ 4.4 mmol/L Cr  3.80 mg/dL<H> BUN 42 mg/dL<H> 09-21 Phos 4.0 mg/dL 09-20 Alb 3.0 g/dL<L>     CAPILLARY BLOOD GLUCOSE        Skin: no pressure ulcers noted.     Estimated Needs:   [ x] no change since previous assessment  [ ] recalculated:     Previous Nutrition Diagnosis:   [ ] Inadequate Energy Intake [ ]Inadequate Oral Intake [ ] Excessive Energy Intake   [ ] Underweight [ ] Increased Nutrient Needs [ ] Overweight/Obesity   [ ] Altered GI Function [ ] Unintended Weight Loss [ ] Food & Nutrition Related Knowledge Deficit [x ] Malnutrition     Nutrition Diagnosis is [x ] ongoing  [ ] resolved [ ] not applicable     New Nutrition Diagnosis: [x ] not applicable       Interventions:   Recommend  [ ] Change Diet To:  [ ] Nutrition Supplement  [ ] Nutrition Support  [ x] Other: may consider liberalizing diet to Regular and adding remeron to help improve po intake.     Monitoring and Evaluation:   [x ] PO intake [ x ] Tolerance to diet prescription [ x ] weights [ x ] labs[ x ] follow up per protocol  [ ] other:

## 2023-09-26 NOTE — PROGRESS NOTE ADULT - NS ATTEND AMEND GEN_ALL_CORE FT
74 y/o M with HTN and BPH presented with generalized fatigue, anorexia, and weight loss for the past month.  Was recently diagnosed with compression fracture and has not been able to do his ADL's.  Was recently back from  with a dx of anemia and advised to get a transfusion but patient and daughter refused.  He is back to [place] for further w/u.  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  Labs revealed WILY.  s/p EGD.  Tolerated well.  NO cardiac complications.  Cont IVF for Cr. downtrending.  Please continue to maintain strict I/Os, monitor daily weights, Cr, and K. To follow while admitted.
72 y/o M with HTN and BPH presented with generalized fatigue, anorexia, and weight loss for the past month.  Was recently diagnosed with compression fracture and has not been able to do his ADL's.  Was recently back from  with a dx of anemia and advised to get a transfusion but patient and daughter refused.  He is back to [place] for further w/u.  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  Labs revealed WILY.  s/p EGD.  Tolerated well.  NO cardiac complications.  Cont IVF for Cr. downtrending.  Please continue to maintain strict I/Os, monitor daily weights, Cr, and K. To follow while admitted.
72 y/o M with HTN and BPH presented with generalized fatigue, anorexia, and weight loss for the past month.  Was recently diagnosed with compression fracture and has not been able to do his ADL's.  Was recently back from  with a dx of anemia and advised to get a transfusion but patient and daughter refused.  He is back to [place] for further w/u.  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  Labs revealed WILY.  s/p EGD.  Tolerated well.  NO cardiac complications.  To follow while admitted.
74 y/o M with HTN and BPH presented with generalized fatigue, anorexia, and weight loss for the past month.  Was recently diagnosed with compression fracture and has not been able to do his ADL's.  Was recently back from  with a dx of anemia and advised to get a transfusion but patient and daughter refused.  He is back to [place] for further w/u.  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  Labs revealed WILY.  s/p EGD.  Tolerated well.  NO cardiac complications.  Cont IVF for Cr. Please continue to maintain strict I/Os, monitor daily weights, Cr, and K. To follow while admitted.
74 y/o M with HTN and BPH presented with generalized fatigue, anorexia, and weight loss for the past month.  Was recently diagnosed with compression fracture and has not been able to do his ADL's.  Was recently back from  with a dx of anemia and advised to get a transfusion but patient and daughter refused.  He is back to [place] for further w/u.  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  Labs revealed WILY.  s/p EGD.  Tolerated well.  NO cardiac complications.  Cont IVF for Cr. downtrending. dc planning per primary team
hx of htn  normal baseline ex cap  s/p egd in setting of anemia and lesion of gastric fundus on ct  found to have large sub mucosal lesion noted in the gastric cardia  no issues post procedure
74 y/o M with HTN and BPH presented with generalized fatigue, anorexia, and weight loss for the past month.  Was recently diagnosed with compression fracture and has not been able to do his ADL's.  Was recently back from  with a dx of anemia and advised to get a transfusion but patient and daughter refused.  He is back to [place] for further w/u.  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  Labs revealed WILY.  s/p EGD.  Tolerated well.  NO cardiac complications.  Cont IVF for Cr. downtrending.  Please continue to maintain strict I/Os, monitor daily weights, Cr, and K. To follow while admitted.

## 2023-09-26 NOTE — PROGRESS NOTE ADULT - SUBJECTIVE AND OBJECTIVE BOX
CHIEF COMPLAINT/INTERVAL HISTORY:  Pt. seen and evaluated for gastric tumor and acute kidney injury.  Pt. is in no distress.  Renal function continues to improve.  Denies having any significant pain or SOB.      REVIEW OF SYSTEMS:  No fever, CP, SOB, or abdominal pain    Vital Signs Last 24 Hrs  T(C): 36.7 (23 Sep 2023 04:41), Max: 36.7 (22 Sep 2023 11:38)  T(F): 98 (23 Sep 2023 04:41), Max: 98 (22 Sep 2023 11:38)  HR: 71 (23 Sep 2023 04:41) (71 - 86)  BP: 128/70 (23 Sep 2023 04:41) (128/70 - 150/71)  BP(mean): --  RR: 16 (23 Sep 2023 04:41) (16 - 17)  SpO2: 95% (23 Sep 2023 04:41) (95% - 95%)    Parameters below as of 23 Sep 2023 04:41  Patient On (Oxygen Delivery Method): room air        PHYSICAL EXAM:  GENERAL: NAD  HEENT: EOMI, hearing normal, conjunctiva and sclera clear  Chest: CTA bilaterally, no wheezing  CV: S1S2, RRR,   GI: soft, +BS, NT/ND  Musculoskeletal: no edema  Psychiatric: affect nL, mood nL  Skin: warm and dry    LABS:                        8.7    4.52  )-----------( 168      ( 23 Sep 2023 06:12 )             25.9     09-23    143  |  110<H>  |  39<H>  ----------------------------<  94  3.9   |  27  |  2.60<H>    Ca    9.9      23 Sep 2023 06:12  Mg     1.7     09-23        Urinalysis Basic - ( 23 Sep 2023 06:12 )    Color: x / Appearance: x / SG: x / pH: x  Gluc: 94 mg/dL / Ketone: x  / Bili: x / Urobili: x   Blood: x / Protein: x / Nitrite: x   Leuk Esterase: x / RBC: x / WBC x   Sq Epi: x / Non Sq Epi: x / Bacteria: x        
Denies CP, SOB    Vital Signs Last 24 Hrs  T(C): 36.8 (09-24-23 @ 10:07), Max: 36.8 (09-24-23 @ 10:07)  T(F): 98.3 (09-24-23 @ 10:07), Max: 98.3 (09-24-23 @ 10:07)  HR: 81 (09-24-23 @ 11:20) (70 - 89)  BP: 126/57 (09-24-23 @ 11:20) (126/57 - 151/74)  RR: 18 (09-24-23 @ 10:07) (17 - 18)  SpO2: 96% (09-24-23 @ 10:07) (94% - 96%)    I&O's Detail    23 Sep 2023 07:01  -  24 Sep 2023 07:00  -------------------------------------------------------  OUT:    Voided (mL): 800 mL  Total OUT: 800 mL    Respiratory: b/l air entry  Cardiovascular: S1 S2  Gastrointestinal: soft, ND  Extremities:  no edema                                8.5    4.70  )-----------( 183      ( 24 Sep 2023 06:00 )             26.0     24 Sep 2023 06:00    141    |  108    |  36     ----------------------------<  93     3.8     |  27     |  2.40     Ca    9.9        24 Sep 2023 06:00  Mg     1.7       23 Sep 2023 06:12    acetaminophen     Tablet 650 milliGRAM(s) Oral every 6 hours PRN  allopurinol 100 milliGRAM(s) Oral daily  amLODIPine   Tablet 5 milliGRAM(s) Oral daily  cyanocobalamin Injectable 1000 MICROGram(s) IntraMuscular daily  folic acid 1 milliGRAM(s) Oral daily  heparin   Injectable 5000 Unit(s) SubCutaneous every 12 hours  lidocaine   4% Patch 1 Patch Transdermal every 24 hours  pantoprazole    Tablet 40 milliGRAM(s) Oral every 12 hours  polyethylene glycol 3350 17 Gram(s) Oral daily  senna 2 Tablet(s) Oral at bedtime  sodium chloride 0.45%. 1000 milliLiter(s) IV Continuous <Continuous>  tamsulosin 0.4 milliGRAM(s) Oral at bedtime    Assessment and Plan:     WILY, anemia, pos SPEP, UPEP (peak Cr 5.7 - 9/17/23)  Suspected MM  S/p bone marrow bx  Await results   WILY improving  Continue IVF  Avoid nephrotoxins  F/u BMP, CBC, UO  Further recommendations pending results of bm bx  D/w family at bedside    [phone]  
INTERVAL HPI/OVERNIGHT EVENTS:    no rectal bleeding, no abd pain   s/p bone marrow bx   low po    MEDICATIONS  (STANDING):  allopurinol 100 milliGRAM(s) Oral daily  cyanocobalamin Injectable 1000 MICROGram(s) IntraMuscular daily  folic acid 1 milliGRAM(s) Oral daily  lidocaine   4% Patch 1 Patch Transdermal every 24 hours  magnesium sulfate  IVPB 2 Gram(s) IV Intermittent once  pantoprazole    Tablet 40 milliGRAM(s) Oral every 12 hours  polyethylene glycol 3350 17 Gram(s) Oral daily  sodium chloride 0.45%. 1000 milliLiter(s) (75 mL/Hr) IV Continuous <Continuous>  tamsulosin 0.4 milliGRAM(s) Oral at bedtime    MEDICATIONS  (PRN):  acetaminophen     Tablet .. 650 milliGRAM(s) Oral every 6 hours PRN Mild Pain (1 - 3)      Allergies    No Known Allergies    Intolerances        Review of Systems:    General:  No wt loss, fevers, chills, night sweats, fatigue   Eyes:  Good vision, no reported pain  ENT:  No sore throat, pain, runny nose, dysphagia  CV:  No pain, palpitations, hypo/hypertension  Resp:  No dyspnea, cough, tachypnea, wheezing  GI:  No pain, No nausea, No vomiting, No diarrhea, No constipation, No weight loss, No fever, No pruritis, No rectal bleeding, No melena, No dysphagia  :  No pain, bleeding, incontinence, nocturia  Muscle:  No pain, weakness  Neuro:  No weakness, tingling, memory problems  Psych:  No fatigue, insomnia, mood problems, depression  Endocrine:  No polyuria, polydypsia, cold/heat intolerance  Heme:  No petechiae, ecchymosis, easy bruisability  Skin:  No rash, tattoos, scars, edema      Vital Signs Last 24 Hrs  T(C): 36.7 (21 Sep 2023 09:11), Max: 36.8 (20 Sep 2023 11:56)  T(F): 98.1 (21 Sep 2023 09:11), Max: 98.3 (20 Sep 2023 11:56)  HR: 95 (21 Sep 2023 11:35) (78 - 96)  BP: 108/69 (21 Sep 2023 11:35) (107/59 - 150/76)  BP(mean): 91 (21 Sep 2023 11:15) (79 - 91)  RR: 15 (21 Sep 2023 11:35) (15 - 19)  SpO2: 98% (21 Sep 2023 11:35) (95% - 99%)    Parameters below as of 21 Sep 2023 11:35  Patient On (Oxygen Delivery Method): room air        PHYSICAL EXAM:    Constitutional: NAD  HEENT: EOMI, throat clear  Neck: No LAD, supple  Respiratory: CTA and P  Cardiovascular: S1 and S2, RRR, no M  Gastrointestinal: BS+, soft, NT/ND, neg HSM,  Extremities: No peripheral edema, neg clubbing, cyanosis  Vascular: 2+ peripheral pulses  Neurological: A/O x 3, no focal deficits  Psychiatric: Normal mood, normal affect  Skin: No rashes      LABS:                        7.6    4.52  )-----------( 157      ( 21 Sep 2023 05:30 )             22.7     09-21    142  |  110<H>  |  42<H>  ----------------------------<  97  4.4   |  27  |  3.80<H>    Ca    9.8      21 Sep 2023 05:30  Phos  4.0     09-21  Mg     1.3     09-21    TPro  5.9<L>  /  Alb  x   /  TBili  x   /  DBili  x   /  AST  x   /  ALT  x   /  AlkPhos  x   09-20      Urinalysis Basic - ( 21 Sep 2023 05:30 )    Color: x / Appearance: x / SG: x / pH: x  Gluc: 97 mg/dL / Ketone: x  / Bili: x / Urobili: x   Blood: x / Protein: x / Nitrite: x   Leuk Esterase: x / RBC: x / WBC x   Sq Epi: x / Non Sq Epi: x / Bacteria: x        RADIOLOGY & ADDITIONAL TESTS:  
Interval History:  no new complaints  s/p bone marrow asp and biopsy without complication    Chart reviewed and events noted;   Overnight events:    MEDICATIONS  (STANDING):  allopurinol 100 milliGRAM(s) Oral daily  cyanocobalamin Injectable 1000 MICROGram(s) IntraMuscular daily  folic acid 1 milliGRAM(s) Oral daily  lidocaine   4% Patch 1 Patch Transdermal every 24 hours  pantoprazole    Tablet 40 milliGRAM(s) Oral every 12 hours  polyethylene glycol 3350 17 Gram(s) Oral daily  sodium chloride 0.45%. 1000 milliLiter(s) (75 mL/Hr) IV Continuous <Continuous>  tamsulosin 0.4 milliGRAM(s) Oral at bedtime    MEDICATIONS  (PRN):  acetaminophen     Tablet .. 650 milliGRAM(s) Oral every 6 hours PRN Mild Pain (1 - 3)      Vital Signs Last 24 Hrs  T(C): 36.8 (21 Sep 2023 14:48), Max: 36.8 (20 Sep 2023 20:11)  T(F): 98.3 (21 Sep 2023 14:48), Max: 98.3 (20 Sep 2023 20:11)  HR: 82 (21 Sep 2023 14:48) (78 - 96)  BP: 128/65 (21 Sep 2023 14:48) (107/59 - 150/76)  BP(mean): 91 (21 Sep 2023 11:15) (79 - 91)  RR: 17 (21 Sep 2023 14:48) (15 - 18)  SpO2: 96% (21 Sep 2023 14:48) (95% - 99%)    Parameters below as of 21 Sep 2023 14:48  Patient On (Oxygen Delivery Method): room air        PHYSICAL EXAM  General: adult in NAD  HEENT: clear oropharynx, anicteric sclera, pink conjunctivae  Neck: supple  CV: normal S1S2 with no murmur rubs or gallops  Lungs: clear to auscultation, no wheezes, no rhales  Abdomen: soft non-tender non-distended, no hepato/splenomegaly  Ext: no clubbing cyanosis or edema  Skin: no rashes and no petichiae  Neuro: alert and oriented X3 no focal deficits      LABS:  CBC Full  -  ( 21 Sep 2023 05:30 )  WBC Count : 4.52 K/uL  RBC Count : 2.57 M/uL  Hemoglobin : 7.6 g/dL  Hematocrit : 22.7 %  Platelet Count - Automated : 157 K/uL  Mean Cell Volume : 88.3 fl  Mean Cell Hemoglobin : 29.6 pg  Mean Cell Hemoglobin Concentration : 33.5 gm/dL  Auto Neutrophil # : 2.10 K/uL  Auto Lymphocyte # : 1.99 K/uL  Auto Monocyte # : 0.26 K/uL  Auto Eosinophil # : 0.09 K/uL  Auto Basophil # : 0.02 K/uL  Auto Neutrophil % : 46.5 %  Auto Lymphocyte % : 44.0 %  Auto Monocyte % : 5.8 %  Auto Eosinophil % : 2.0 %  Auto Basophil % : 0.4 %    09-21    142  |  110<H>  |  42<H>  ----------------------------<  97  4.4   |  27  |  3.80<H>    Ca    9.8      21 Sep 2023 05:30  Phos  4.0     09-21  Mg     1.3     09-21    TPro  5.9<L>  /  Alb  x   /  TBili  x   /  DBili  x   /  AST  x   /  ALT  x   /  AlkPhos  x   09-20        fe studies  Ferritin: 686 ng/mL (09-18 @ 07:08)  Iron - Total Binding Capacity.: 223 ug/dL (09-18 @ 07:08)      WBC trend  4.52 K/uL (09-21-23 @ 05:30)  4.72 K/uL (09-20-23 @ 08:20)  4.64 K/uL (09-19-23 @ 07:22)      Hgb trend  7.6 g/dL (09-21-23 @ 05:30)  7.8 g/dL (09-20-23 @ 08:20)  8.2 g/dL (09-19-23 @ 07:22)      plt trend  157 K/uL (09-21-23 @ 05:30)  176 K/uL (09-20-23 @ 08:20)  171 K/uL (09-19-23 @ 07:22)        RADIOLOGY & ADDITIONAL STUDIES:    
Patient is a 73y old  Male who presents with a chief complaint of anemia, gastric mass concerning for malignancy, wily (18 Sep 2023 16:24)    Patient seen in follow up for WILY.        PAST MEDICAL HISTORY:  No pertinent past medical history    HTN (hypertension)    BPH (benign prostatic hyperplasia)      MEDICATIONS  (STANDING):  cyanocobalamin 1000 MICROGram(s) Oral once  heparin   Injectable 5000 Unit(s) SubCutaneous every 12 hours  lidocaine   4% Patch 1 Patch Transdermal every 24 hours  pantoprazole    Tablet 40 milliGRAM(s) Oral every 12 hours  polyethylene glycol 3350 17 Gram(s) Oral daily  sodium chloride 0.45%. 1000 milliLiter(s) (75 mL/Hr) IV Continuous <Continuous>  tamsulosin 0.4 milliGRAM(s) Oral at bedtime    MEDICATIONS  (PRN):  acetaminophen     Tablet .. 650 milliGRAM(s) Oral every 6 hours PRN Mild Pain (1 - 3)    T(C): 36.4 (09-19-23 @ 05:01), Max: 36.8 (09-18-23 @ 12:31)  HR: 78 (09-19-23 @ 05:01) (78 - 107)  BP: 111/60 (09-19-23 @ 05:01) (108/57 - 132/73)  RR: 18 (09-19-23 @ 05:01) (15 - 18)  SpO2: 95% (09-19-23 @ 05:01) (95% - 100%)  Wt(kg): --  I&O's Detail      PHYSICAL EXAM:  General: No distress  Respiratory: b/l air entry  Cardiovascular: S1 S2  Gastrointestinal: soft  Extremities:  no edema                              8.2    4.64  )-----------( 171      ( 19 Sep 2023 07:22 )             25.0     09-19    145  |  112<H>  |  51<H>  ----------------------------<  98  4.5   |  26  |  4.70<H>    Ca    9.6      19 Sep 2023 07:22  Phos  4.7     09-19  Mg     1.7     09-19    TPro  6.1  /  Alb  3.1<L>  /  TBili  0.3  /  DBili  x   /  AST  16  /  ALT  25  /  AlkPhos  53  09-19        LIVER FUNCTIONS - ( 19 Sep 2023 07:22 )  Alb: 3.1 g/dL / Pro: 6.1 g/dL / ALK PHOS: 53 U/L / ALT: 25 U/L / AST: 16 U/L / GGT: x           Urinalysis Basic - ( 19 Sep 2023 07:22 )    Color: x / Appearance: x / SG: x / pH: x  Gluc: 98 mg/dL / Ketone: x  / Bili: x / Urobili: x   Blood: x / Protein: x / Nitrite: x   Leuk Esterase: x / RBC: x / WBC x   Sq Epi: x / Non Sq Epi: x / Bacteria: x        Sodium, Serum: 145 (09-19 @ 07:22)  Sodium, Serum: 144 (09-18 @ 07:08)  Sodium, Serum: 142 (09-17 @ 19:30)  Sodium, Serum: 141 (09-17 @ 12:45)    Creatinine, Serum: 4.70 (09-19 @ 07:22)  Creatinine, Serum: 4.40 (09-18 @ 07:08)  Creatinine, Serum: 5.00 (09-17 @ 19:30)  Creatinine, Serum: 5.70 (09-17 @ 12:45)    Potassium, Serum: 4.5 (09-19 @ 07:22)  Potassium, Serum: 3.9 (09-18 @ 07:08)  Potassium, Serum: 3.9 (09-17 @ 19:30)  Potassium, Serum: 3.8 (09-17 @ 12:45)    Hemoglobin: 8.2 (09-19 @ 07:22)  Hemoglobin: 8.6 (09-18 @ 07:08)  Hemoglobin: 8.9 (09-17 @ 12:45)    
All interim records and events noted.    appear weak but comfortable in bed  son present        MEDICATIONS  (STANDING):  allopurinol 100 milliGRAM(s) Oral daily  amLODIPine   Tablet 5 milliGRAM(s) Oral daily  cyanocobalamin Injectable 1000 MICROGram(s) IntraMuscular daily  folic acid 1 milliGRAM(s) Oral daily  heparin   Injectable 5000 Unit(s) SubCutaneous every 12 hours  lidocaine   4% Patch 1 Patch Transdermal every 24 hours  pantoprazole    Tablet 40 milliGRAM(s) Oral every 12 hours  polyethylene glycol 3350 17 Gram(s) Oral daily  senna 2 Tablet(s) Oral at bedtime  sodium chloride 0.45%. 1000 milliLiter(s) (75 mL/Hr) IV Continuous <Continuous>  tamsulosin 0.4 milliGRAM(s) Oral at bedtime    MEDICATIONS  (PRN):  acetaminophen     Tablet .. 650 milliGRAM(s) Oral every 6 hours PRN Mild Pain (1 - 3)      Vital Signs Last 24 Hrs  T(C): 36.6 (26 Sep 2023 12:28), Max: 36.7 (25 Sep 2023 20:57)  T(F): 97.9 (26 Sep 2023 12:28), Max: 98.1 (25 Sep 2023 20:57)  HR: 84 (26 Sep 2023 12:28) (73 - 84)  BP: 134/69 (26 Sep 2023 12:28) (131/65 - 144/69)  BP(mean): --  RR: 17 (26 Sep 2023 12:28) (17 - 18)  SpO2: 94% (26 Sep 2023 12:28) (94% - 96%)    Parameters below as of 26 Sep 2023 12:28  Patient On (Oxygen Delivery Method): room air        PHYSICAL EXAM  General: frail elderly man in bed in no acute distress  Head: atraumatic, normocephalic  ENT: sclera anicteric, buccal mucosa moist  Neck: supple, trachea midline, no palpable masses  CV: S1 S2, regular rate and rhythm  Lungs: clear to auscultation, no wheezes/rhonchi  Abdomen: soft, nontender, bowel sounds present, no palpable masses  Extrem: no clubbing/cyanosis/edema  Skin: no significant increased ecchymosis/petechiae  Neuro: alert and oriented X3,  no focal deficits      LABS:             8.4    5.11  )-----------( 185      ( 09-25 @ 05:55 )             25.5                8.5    4.70  )-----------( 183      ( 09-24 @ 06:00 )             26.0       09-25    142  |  110<H>  |  30<H>  ----------------------------<  94  4.1   |  27  |  2.30<H>    Ca    10.3<H>      25 Sep 2023 05:55  Phos  3.8     09-25  Mg     1.6     09-25          RADIOLOGY & ADDITIONAL STUDIES:    IMPRESSION/RECOMMENDATIONS:
CHIEF COMPLAINT/INTERVAL HISTORY:  Pt. seen and evaluated for gastric tumor and WILY.  Daughter Silvia on phone providing interpretation.  Pt. is in no distress.   Reports feeling better today.  Scheduled for bonemarrow biopsy.      REVIEW OF SYSTEMS:  No fever, CP, SOB, or abdominal pain    Vital Signs Last 24 Hrs  T(C): 36.7 (21 Sep 2023 04:27), Max: 36.8 (20 Sep 2023 11:56)  T(F): 98 (21 Sep 2023 04:27), Max: 98.3 (20 Sep 2023 11:56)  HR: 82 (21 Sep 2023 04:27) (82 - 94)  BP: 110/64 (20 Sep 2023 11:56) (110/64 - 110/64)  BP(mean): --  RR: 17 (21 Sep 2023 04:27) (17 - 19)  SpO2: 95% (21 Sep 2023 04:27) (95% - 98%)    Parameters below as of 21 Sep 2023 04:27  Patient On (Oxygen Delivery Method): room air        PHYSICAL EXAM:  GENERAL: NAD  HEENT: EOMI, hearing normal, conjunctiva and sclera clear  Chest: CTA bilaterally, no wheezing  CV: S1S2, RRR,   GI: soft, +BS, NT/ND  Musculoskeletal: no edema  Psychiatric: affect nL, mood nL  Skin: warm and dry    LABS:                        7.6    4.52  )-----------( 157      ( 21 Sep 2023 05:30 )             22.7     09-21    142  |  110<H>  |  42<H>  ----------------------------<  97  4.4   |  27  |  3.80<H>    Ca    9.8      21 Sep 2023 05:30  Phos  4.0     09-21  Mg     1.3     09-21    TPro  6.3  /  Alb  3.0<L>  /  TBili  0.3  /  DBili  x   /  AST  14<L>  /  ALT  23  /  AlkPhos  56  09-20      Urinalysis Basic - ( 21 Sep 2023 05:30 )    Color: x / Appearance: x / SG: x / pH: x  Gluc: 97 mg/dL / Ketone: x  / Bili: x / Urobili: x   Blood: x / Protein: x / Nitrite: x   Leuk Esterase: x / RBC: x / WBC x   Sq Epi: x / Non Sq Epi: x / Bacteria: x        
CHIEF COMPLAINT/INTERVAL HISTORY:  Pt. seen and evaluated for gastric tumor and WILY.  Pt. is in no distress.  Daughter at bedside offering translation.  Pt. s/p bone marrow biopsy yesterday.  Renal function improving.      REVIEW OF SYSTEMS:  No fever, CP, SOB, or abdominal pain     Vital Signs Last 24 Hrs  T(C): 36.7 (22 Sep 2023 11:38), Max: 36.8 (21 Sep 2023 14:48)  T(F): 98 (22 Sep 2023 11:38), Max: 98.3 (21 Sep 2023 14:48)  HR: 86 (22 Sep 2023 11:38) (68 - 86)  BP: 150/71 (22 Sep 2023 11:38) (128/65 - 150/71)  BP(mean): --  RR: 17 (22 Sep 2023 11:38) (16 - 17)  SpO2: 95% (22 Sep 2023 11:38) (94% - 96%)    Parameters below as of 22 Sep 2023 11:38  Patient On (Oxygen Delivery Method): room air        PHYSICAL EXAM:  GENERAL: NAD  HEENT: EOMI, hearing normal, conjunctiva and sclera clear  Chest: CTA bilaterally, no wheezing  CV: S1S2, RRR,   GI: soft, +BS, NT/ND  Musculoskeletal: no edema  Psychiatric: affect nL, mood nL  Skin: warm and dry    LABS:                        8.3    4.74  )-----------( 155      ( 22 Sep 2023 06:00 )             23.8     09-22    142  |  110<H>  |  40<H>  ----------------------------<  93  3.8   |  25  |  3.20<H>    Ca    9.5      22 Sep 2023 06:00  Phos  4.0     09-21  Mg     2.0     09-22    TPro  5.9<L>  /  Alb  x   /  TBili  x   /  DBili  x   /  AST  x   /  ALT  x   /  AlkPhos  x   09-20      Urinalysis Basic - ( 22 Sep 2023 06:00 )    Color: x / Appearance: x / SG: x / pH: x  Gluc: 93 mg/dL / Ketone: x  / Bili: x / Urobili: x   Blood: x / Protein: x / Nitrite: x   Leuk Esterase: x / RBC: x / WBC x   Sq Epi: x / Non Sq Epi: x / Bacteria: x      
[facility]  Michele Ferreira PA-C  [address] [place] [address]  [phone]    INTERVAL HPI/OVERNIGHT EVENTS:  pt seen, no rectal bleeding, no abd pain   s/p bone marrow bx   poor po intake    MEDICATIONS  (STANDING):  allopurinol 100 milliGRAM(s) Oral daily  cyanocobalamin Injectable 1000 MICROGram(s) IntraMuscular daily  folic acid 1 milliGRAM(s) Oral daily  heparin   Injectable 5000 Unit(s) SubCutaneous every 12 hours  lidocaine   4% Patch 1 Patch Transdermal every 24 hours  pantoprazole    Tablet 40 milliGRAM(s) Oral every 12 hours  polyethylene glycol 3350 17 Gram(s) Oral daily  sodium chloride 0.45%. 1000 milliLiter(s) (50 mL/Hr) IV Continuous <Continuous>  tamsulosin 0.4 milliGRAM(s) Oral at bedtime    MEDICATIONS  (PRN):  acetaminophen     Tablet .. 650 milliGRAM(s) Oral every 6 hours PRN Mild Pain (1 - 3)          Allergies    No Known Allergies    Intolerances        Review of Systems:    General:  No wt loss, fevers, chills, night sweats, fatigue   Eyes:  Good vision, no reported pain  ENT:  No sore throat, pain, runny nose, dysphagia  CV:  No pain, palpitations, hypo/hypertension  Resp:  No dyspnea, cough, tachypnea, wheezing  GI:  No pain, No nausea, No vomiting, No diarrhea, No constipation, No weight loss, No fever, No pruritis, No rectal bleeding, No melena, No dysphagia  :  No pain, bleeding, incontinence, nocturia  Muscle:  No pain, weakness  Neuro:  No weakness, tingling, memory problems  Psych:  No fatigue, insomnia, mood problems, depression  Endocrine:  No polyuria, polydypsia, cold/heat intolerance  Heme:  No petechiae, ecchymosis, easy bruisability  Skin:  No rash, tattoos, scars, edema      Vital Signs Last 24 Hrs  Vital Signs Last 24 Hrs  T(C): 36.7 (23 Sep 2023 04:41), Max: 36.7 (22 Sep 2023 20:03)  T(F): 98 (23 Sep 2023 04:41), Max: 98 (22 Sep 2023 20:03)  HR: 71 (23 Sep 2023 04:41) (71 - 78)  BP: 128/70 (23 Sep 2023 04:41) (128/70 - 142/68)  BP(mean): --  RR: 16 (23 Sep 2023 04:41) (16 - 17)  SpO2: 95% (23 Sep 2023 04:41) (95% - 95%)    Parameters below as of 23 Sep 2023 04:41  Patient On (Oxygen Delivery Method): room air            PHYSICAL EXAM:    Constitutional: NAD  HEENT: EOMI, throat clear  Neck: No LAD, supple  Respiratory: CTA and P  Cardiovascular: S1 and S2, RRR, no M  Gastrointestinal: BS+, soft, NT/ND, neg HSM,  Extremities: No peripheral edema, neg clubbing, cyanosis  Vascular: 2+ peripheral pulses  Neurological: A/O x 3, no focal deficits  Psychiatric: Normal mood, normal affect  Skin: No rashes      LABS:                        8.7    4.52  )-----------( 168      ( 23 Sep 2023 06:12 )             25.9          09-23    143  |  110<H>  |  39<H>  ----------------------------<  94  3.9   |  27  |  2.60<H>    Ca    9.9      23 Sep 2023 06:12  Mg     1.7     09-23              Urinalysis Basic - ( 21 Sep 2023 05:30 )    Color: x / Appearance: x / SG: x / pH: x  Gluc: 97 mg/dL / Ketone: x  / Bili: x / Urobili: x   Blood: x / Protein: x / Nitrite: x   Leuk Esterase: x / RBC: x / WBC x   Sq Epi: x / Non Sq Epi: x / Bacteria: x        RADIOLOGY & ADDITIONAL TESTS:  
[facility] -- Peter Piña Pannella, Patel, Savella, Goodger, Cohen  Office # [phone]    Follow Up:   Cardiac clearance, HTN     Subjective/Observations: seen and examined, awake, alert, resting comfortably in bed, denies chest pain, dyspnea, palpitations or dizziness, orthopnea and PND. Tolerating room air. IVF infusing, c/o right lower back pain     REVIEW OF SYSTEMS: All other review of systems is negative unless indicated above  PAST MEDICAL & SURGICAL HISTORY:  HTN (hypertension)      BPH (benign prostatic hyperplasia)      History of appendectomy        MEDICATIONS  (STANDING):  allopurinol 100 milliGRAM(s) Oral daily  cyanocobalamin Injectable 1000 MICROGram(s) IntraMuscular daily  folic acid 1 milliGRAM(s) Oral daily  lidocaine   4% Patch 1 Patch Transdermal every 24 hours  magnesium sulfate  IVPB 2 Gram(s) IV Intermittent once  pantoprazole    Tablet 40 milliGRAM(s) Oral every 12 hours  polyethylene glycol 3350 17 Gram(s) Oral daily  sodium chloride 0.45%. 1000 milliLiter(s) (75 mL/Hr) IV Continuous <Continuous>  tamsulosin 0.4 milliGRAM(s) Oral at bedtime    MEDICATIONS  (PRN):  acetaminophen     Tablet .. 650 milliGRAM(s) Oral every 6 hours PRN Mild Pain (1 - 3)    Allergies    No Known Allergies    Intolerances      Vital Signs Last 24 Hrs  T(C): 36.7 (21 Sep 2023 09:11), Max: 36.8 (20 Sep 2023 11:56)  T(F): 98.1 (21 Sep 2023 09:11), Max: 98.3 (20 Sep 2023 11:56)  HR: 89 (21 Sep 2023 09:11) (82 - 96)  BP: 145/71 (21 Sep 2023 09:11) (110/64 - 150/76)  BP(mean): --  RR: 15 (21 Sep 2023 09:11) (15 - 19)  SpO2: 99% (21 Sep 2023 09:11) (95% - 99%)    Parameters below as of 21 Sep 2023 09:05  Patient On (Oxygen Delivery Method): room air      I&O's Summary    20 Sep 2023 07:01  -  21 Sep 2023 07:00  --------------------------------------------------------  IN: 1020 mL / OUT: 1500 mL / NET: -480 mL      Weight (kg): 77.1 (09-21 @ 09:11)    TELE: Not on telemetry   PHYSICAL EXAM:  Constitutional: NAD, awake and alert  HEENT: Moist Mucous Membranes, Anicteric  Pulmonary: Non-labored, breath sounds are clear bilaterally, No wheezing, rales or rhonchi  Cardiovascular: Regular, S1 and S2, No murmurs, rubs, gallops or clicks  Gastrointestinal: Bowel Sounds present, soft, nontender.   Lymph: No peripheral edema. No lymphadenopathy.  Skin: No visible rashes or ulcers.  Psych:  Mood & affect appropriate  LABS: All Labs Reviewed:                        7.6    4.52  )-----------( 157      ( 21 Sep 2023 05:30 )             22.7                         7.8    4.72  )-----------( 176      ( 20 Sep 2023 08:20 )             23.6                         8.2    4.64  )-----------( 171      ( 19 Sep 2023 07:22 )             25.0     21 Sep 2023 05:30    142    |  110    |  42     ----------------------------<  97     4.4     |  27     |  3.80   20 Sep 2023 08:20    142    |  111    |  46     ----------------------------<  100    4.4     |  25     |  4.30   19 Sep 2023 07:22    145    |  112    |  51     ----------------------------<  98     4.5     |  26     |  4.70     Ca    9.8        21 Sep 2023 05:30  Ca    9.9        20 Sep 2023 08:20  Ca    9.6        19 Sep 2023 07:22  Phos  4.0       21 Sep 2023 05:30  Phos  4.7       19 Sep 2023 07:22  Mg     1.3       21 Sep 2023 05:30  Mg     1.7       19 Sep 2023 07:22    TPro  5.9    /  Alb  x      /  TBili  x      /  DBili  x      /  AST  x      /  ALT  x      /  AlkPhos  x      20 Sep 2023 16:25  TPro  6.3    /  Alb  3.0    /  TBili  0.3    /  DBili  x      /  AST  14     /  ALT  23     /  AlkPhos  56     20 Sep 2023 08:20  TPro  6.1    /  Alb  3.1    /  TBili  0.3    /  DBili  x      /  AST  16     /  ALT  25     /  AlkPhos  53     19 Sep 2023 07:22          12 Lead ECG:   Ventricular Rate 76 BPM    Atrial Rate 76 BPM    P-R Interval 146 ms    QRS Duration 90 ms    Q-T Interval 366 ms    QTC Calculation(Bazett) 411 ms    P Axis 47 degrees    R Axis -19 degrees    T Axis 34 degrees    Diagnosis Line Normal sinus rhythm  Normal ECG  When compared with ECG of 07-JAN-2022 10:24,  No significant change was found  Confirmed by Chantal Metz ([id number]) on 9/17/2023 2:37:37 PM (09-17-23 @ 14:05)       
[facility]  Michele Ferreira PA-C  [address] [place] [address]  [phone]    INTERVAL HPI/OVERNIGHT EVENTS:  pt seen, no rectal bleeding, no abd pain   s/p bone marrow bx   poor po intake, reports food has no taste    MEDICATIONS  (STANDING):  allopurinol 100 milliGRAM(s) Oral daily  cyanocobalamin Injectable 1000 MICROGram(s) IntraMuscular daily  folic acid 1 milliGRAM(s) Oral daily  heparin   Injectable 5000 Unit(s) SubCutaneous every 12 hours  lidocaine   4% Patch 1 Patch Transdermal every 24 hours  pantoprazole    Tablet 40 milliGRAM(s) Oral every 12 hours  polyethylene glycol 3350 17 Gram(s) Oral daily  sodium chloride 0.45%. 1000 milliLiter(s) (50 mL/Hr) IV Continuous <Continuous>  tamsulosin 0.4 milliGRAM(s) Oral at bedtime    MEDICATIONS  (PRN):  acetaminophen     Tablet .. 650 milliGRAM(s) Oral every 6 hours PRN Mild Pain (1 - 3)          Allergies    No Known Allergies    Intolerances        Review of Systems:    General:  No wt loss, fevers, chills, night sweats, fatigue   Eyes:  Good vision, no reported pain  ENT:  No sore throat, pain, runny nose, dysphagia  CV:  No pain, palpitations, hypo/hypertension  Resp:  No dyspnea, cough, tachypnea, wheezing  GI:  No pain, No nausea, No vomiting, No diarrhea, No constipation, No weight loss, No fever, No pruritis, No rectal bleeding, No melena, No dysphagia  :  No pain, bleeding, incontinence, nocturia  Muscle:  No pain, weakness  Neuro:  No weakness, tingling, memory problems  Psych:  No fatigue, insomnia, mood problems, depression  Endocrine:  No polyuria, polydypsia, cold/heat intolerance  Heme:  No petechiae, ecchymosis, easy bruisability  Skin:  No rash, tattoos, scars, edema      Vital Signs Last 24 Hrs  Vital Signs Last 24 Hrs  T(C): 36.8 (24 Sep 2023 10:07), Max: 37.1 (23 Sep 2023 13:33)  T(F): 98.3 (24 Sep 2023 10:07), Max: 98.7 (23 Sep 2023 13:33)  HR: 81 (24 Sep 2023 11:20) (70 - 89)  BP: 126/57 (24 Sep 2023 11:20) (126/57 - 151/74)  BP(mean): --  RR: 18 (24 Sep 2023 10:07) (16 - 18)  SpO2: 96% (24 Sep 2023 10:07) (94% - 96%)    Parameters below as of 24 Sep 2023 10:07  Patient On (Oxygen Delivery Method): room air        PHYSICAL EXAM:    Constitutional: NAD  HEENT: EOMI, throat clear  Neck: No LAD, supple  Respiratory: CTA and P  Cardiovascular: S1 and S2, RRR, no M  Gastrointestinal: BS+, soft, NT/ND, neg HSM,  Extremities: No peripheral edema, neg clubbing, cyanosis  Vascular: 2+ peripheral pulses  Neurological: A/O x 3, no focal deficits  Psychiatric: Normal mood, normal affect  Skin: No rashes      LABS:                                 8.5    4.70  )-----------( 183      ( 24 Sep 2023 06:00 )             26.0   09-24    141  |  108  |  36<H>  ----------------------------<  93  3.8   |  27  |  2.40<H>    Ca    9.9      24 Sep 2023 06:00  Mg     1.7     09-23        Urinalysis Basic - ( 21 Sep 2023 05:30 )    Color: x / Appearance: x / SG: x / pH: x  Gluc: 97 mg/dL / Ketone: x  / Bili: x / Urobili: x   Blood: x / Protein: x / Nitrite: x   Leuk Esterase: x / RBC: x / WBC x   Sq Epi: x / Non Sq Epi: x / Bacteria: x        RADIOLOGY & ADDITIONAL TESTS:  
INTERVAL HPI/OVERNIGHT EVENTS:    family bedside   eating better, appetite is improving   no n/v  not constipated    MEDICATIONS  (STANDING):  allopurinol 100 milliGRAM(s) Oral daily  amLODIPine   Tablet 5 milliGRAM(s) Oral daily  cyanocobalamin Injectable 1000 MICROGram(s) IntraMuscular daily  folic acid 1 milliGRAM(s) Oral daily  heparin   Injectable 5000 Unit(s) SubCutaneous every 12 hours  lidocaine   4% Patch 1 Patch Transdermal every 24 hours  pantoprazole    Tablet 40 milliGRAM(s) Oral every 12 hours  polyethylene glycol 3350 17 Gram(s) Oral daily  senna 2 Tablet(s) Oral at bedtime  sodium chloride 0.45%. 1000 milliLiter(s) (75 mL/Hr) IV Continuous <Continuous>  tamsulosin 0.4 milliGRAM(s) Oral at bedtime    MEDICATIONS  (PRN):  acetaminophen     Tablet .. 650 milliGRAM(s) Oral every 6 hours PRN Mild Pain (1 - 3)      Allergies    No Known Allergies    Intolerances        Review of Systems:    General:  No wt loss, fevers, chills, night sweats, fatigue   Eyes:  Good vision, no reported pain  ENT:  No sore throat, pain, runny nose, dysphagia  CV:  No pain, palpitations, hypo/hypertension  Resp:  No dyspnea, cough, tachypnea, wheezing  GI:  No pain, No nausea, No vomiting, No diarrhea, No constipation, No weight loss, No fever, No pruritis, No rectal bleeding, No melena, No dysphagia  :  No pain, bleeding, incontinence, nocturia  Muscle:  No pain, weakness  Neuro:  No weakness, tingling, memory problems  Psych:  No fatigue, insomnia, mood problems, depression  Endocrine:  No polyuria, polydypsia, cold/heat intolerance  Heme:  No petechiae, ecchymosis, easy bruisability  Skin:  No rash, tattoos, scars, edema      Vital Signs Last 24 Hrs  T(C): 36.6 (26 Sep 2023 12:28), Max: 36.7 (25 Sep 2023 20:57)  T(F): 97.9 (26 Sep 2023 12:28), Max: 98.1 (25 Sep 2023 20:57)  HR: 84 (26 Sep 2023 12:28) (73 - 84)  BP: 134/69 (26 Sep 2023 12:28) (131/65 - 144/69)  BP(mean): --  RR: 17 (26 Sep 2023 12:28) (17 - 18)  SpO2: 94% (26 Sep 2023 12:28) (94% - 96%)    Parameters below as of 26 Sep 2023 12:28  Patient On (Oxygen Delivery Method): room air        PHYSICAL EXAM:    Constitutional: NAD  HEENT: EOMI, throat clear  Neck: No LAD, supple  Respiratory: CTA and P  Cardiovascular: S1 and S2, RRR, no M  Gastrointestinal: BS+, soft, NT/ND, neg HSM,  Extremities: No peripheral edema, neg clubbing, cyanosis  Vascular: 2+ peripheral pulses  Neurological: A/O x 3, no focal deficits  Psychiatric: Normal mood, normal affect  Skin: No rashes      LABS:                        8.4    5.11  )-----------( 185      ( 25 Sep 2023 05:55 )             25.5     09-25    142  |  110<H>  |  30<H>  ----------------------------<  94  4.1   |  27  |  2.30<H>    Ca    10.3<H>      25 Sep 2023 05:55  Phos  3.8     09-25  Mg     1.6     09-25        Urinalysis Basic - ( 25 Sep 2023 05:55 )    Color: x / Appearance: x / SG: x / pH: x  Gluc: 94 mg/dL / Ketone: x  / Bili: x / Urobili: x   Blood: x / Protein: x / Nitrite: x   Leuk Esterase: x / RBC: x / WBC x   Sq Epi: x / Non Sq Epi: x / Bacteria: x        RADIOLOGY & ADDITIONAL TESTS:  
[facility] -- Peter Piña Pannella, Patel, Savella, Goodger, Cohen  Office # [phone]    Follow Up:  Cardiac clearance, HTN    Subjective/Observations: seen and examined, awake, alert, resting comfortably in bed, denies chest pain, dyspnea, palpitations or dizziness, orthopnea and PND. Tolerating room air. IVF  infusing      REVIEW OF SYSTEMS: All other review of systems is negative unless indicated above  PAST MEDICAL & SURGICAL HISTORY:  HTN (hypertension)      BPH (benign prostatic hyperplasia)      History of appendectomy        MEDICATIONS  (STANDING):  allopurinol 100 milliGRAM(s) Oral daily  cyanocobalamin Injectable 1000 MICROGram(s) IntraMuscular daily  folic acid 1 milliGRAM(s) Oral daily  heparin   Injectable 5000 Unit(s) SubCutaneous every 12 hours  lidocaine   4% Patch 1 Patch Transdermal every 24 hours  pantoprazole    Tablet 40 milliGRAM(s) Oral every 12 hours  polyethylene glycol 3350 17 Gram(s) Oral daily  sodium chloride 0.45%. 1000 milliLiter(s) (50 mL/Hr) IV Continuous <Continuous>  tamsulosin 0.4 milliGRAM(s) Oral at bedtime    MEDICATIONS  (PRN):  acetaminophen     Tablet .. 650 milliGRAM(s) Oral every 6 hours PRN Mild Pain (1 - 3)    Allergies    No Known Allergies    Intolerances      Vital Signs Last 24 Hrs  T(C): 36.7 (23 Sep 2023 04:41), Max: 36.7 (22 Sep 2023 11:38)  T(F): 98 (23 Sep 2023 04:41), Max: 98 (22 Sep 2023 11:38)  HR: 71 (23 Sep 2023 04:41) (71 - 86)  BP: 128/70 (23 Sep 2023 04:41) (128/70 - 150/71)  BP(mean): --  RR: 16 (23 Sep 2023 04:41) (16 - 17)  SpO2: 95% (23 Sep 2023 04:41) (95% - 95%)    Parameters below as of 23 Sep 2023 04:41  Patient On (Oxygen Delivery Method): room air      I&O's Summary    22 Sep 2023 07:01  -  23 Sep 2023 07:00  --------------------------------------------------------  IN: 0 mL / OUT: 800 mL / NET: -800 mL        TELE: Not on telemetry   PHYSICAL EXAM:  Constitutional: NAD, awake and alert  HEENT: Moist Mucous Membranes, Anicteric  Pulmonary: Non-labored, breath sounds are clear bilaterally, No wheezing, rales or rhonchi  Cardiovascular: Regular, S1 and S2, No murmurs, rubs, gallops or clicks  Gastrointestinal: Bowel Sounds present, soft, nontender.   Lymph: No peripheral edema. No lymphadenopathy.  Skin: No visible rashes or ulcers.  Psych:  Mood & affect appropriate  LABS: All Labs Reviewed:                        8.7    4.52  )-----------( 168      ( 23 Sep 2023 06:12 )             25.9                         8.3    4.74  )-----------( 155      ( 22 Sep 2023 06:00 )             23.8                         7.6    4.52  )-----------( 157      ( 21 Sep 2023 05:30 )             22.7     23 Sep 2023 06:12    143    |  110    |  39     ----------------------------<  94     3.9     |  27     |  2.60   22 Sep 2023 06:00    142    |  110    |  40     ----------------------------<  93     3.8     |  25     |  3.20   21 Sep 2023 05:30    142    |  110    |  42     ----------------------------<  97     4.4     |  27     |  3.80     Ca    9.9        23 Sep 2023 06:12  Ca    9.5        22 Sep 2023 06:00  Ca    9.8        21 Sep 2023 05:30  Phos  4.0       21 Sep 2023 05:30  Mg     1.7       23 Sep 2023 06:12  Mg     2.0       22 Sep 2023 06:00  Mg     1.3       21 Sep 2023 05:30    TPro  5.9    /  Alb  x      /  TBili  x      /  DBili  x      /  AST  x      /  ALT  x      /  AlkPhos  x      20 Sep 2023 16:25          12 Lead ECG:   Ventricular Rate 76 BPM    Atrial Rate 76 BPM    P-R Interval 146 ms    QRS Duration 90 ms    Q-T Interval 366 ms    QTC Calculation(Bazett) 411 ms    P Axis 47 degrees    R Axis -19 degrees    T Axis 34 degrees    Diagnosis Line Normal sinus rhythm  Normal ECG  When compared with ECG of 07-JAN-2022 10:24,  No significant change was found  Confirmed by Chantal Metz ([id number]) on 9/17/2023 2:37:37 PM (09-17-23 @ 14:05)       
Patient is a 73y old  Male who presents with a chief complaint of anemia, gastric mass concerning for malignancy, wily (18 Sep 2023 16:24)  Patient seen in follow up for WILY.        PAST MEDICAL HISTORY:  HTN (hypertension)    BPH (benign prostatic hyperplasia)        MEDICATIONS  (STANDING):  allopurinol 100 milliGRAM(s) Oral daily  cyanocobalamin Injectable 1000 MICROGram(s) IntraMuscular daily  folic acid 1 milliGRAM(s) Oral daily  lidocaine   4% Patch 1 Patch Transdermal every 24 hours  pantoprazole    Tablet 40 milliGRAM(s) Oral every 12 hours  polyethylene glycol 3350 17 Gram(s) Oral daily  sodium chloride 0.45%. 1000 milliLiter(s) (75 mL/Hr) IV Continuous <Continuous>  tamsulosin 0.4 milliGRAM(s) Oral at bedtime    MEDICATIONS  (PRN):  acetaminophen     Tablet .. 650 milliGRAM(s) Oral every 6 hours PRN Mild Pain (1 - 3)    T(C): 36.7 (09-22-23 @ 11:38), Max: 36.8 (09-20-23 @ 20:11)  HR: 86 (09-22-23 @ 11:38) (68 - 96)  BP: 150/71 (09-22-23 @ 11:38) (107/59 - 150/76)  RR: 17 (09-22-23 @ 11:38)  SpO2: 95% (09-22-23 @ 11:38)  Wt(kg): --  I&O's Detail    21 Sep 2023 07:01  -  22 Sep 2023 07:00  --------------------------------------------------------  IN:    IV PiggyBack: 50 mL    PRBCs (Packed Red Blood Cells): 335 mL    sodium chloride 0.45%: 300 mL  Total IN: 685 mL    OUT:    Voided (mL): 700 mL  Total OUT: 700 mL    Total NET: -15 mL                  PHYSICAL EXAM:  General: No distress  Respiratory: b/l air entry  Cardiovascular: S1 S2  Gastrointestinal: soft  Extremities:  no edema                           LABORATORY:                        8.3    4.74  )-----------( 155      ( 22 Sep 2023 06:00 )             23.8     09-22    142  |  110<H>  |  40<H>  ----------------------------<  93  3.8   |  25  |  3.20<H>    Ca    9.5      22 Sep 2023 06:00  Phos  4.0     09-21  Mg     2.0     09-22    TPro  5.9<L>  /  Alb  x   /  TBili  x   /  DBili  x   /  AST  x   /  ALT  x   /  AlkPhos  x   09-20    Sodium: 142 mmol/L (09-22 @ 06:00)  Sodium: 142 mmol/L (09-21 @ 05:30)    Potassium: 3.8 mmol/L (09-22 @ 06:00)  Potassium: 4.4 mmol/L (09-21 @ 05:30)    Hemoglobin: 8.3 g/dL (09-22 @ 06:00)  Hemoglobin: 7.6 g/dL (09-21 @ 05:30)  Hemoglobin: 7.8 g/dL (09-20 @ 08:20)    Creatinine, Serum 3.20 (09-22 @ 06:00)  Creatinine, Serum 3.80 (09-21 @ 05:30)  Creatinine, Serum 4.30 (09-20 @ 08:20)        LIVER FUNCTIONS - ( 20 Sep 2023 16:25 )  Alb: x     / Pro: 5.9 g/dL / ALK PHOS: x     / ALT: x     / AST: x     / GGT: x           Urinalysis Basic - ( 22 Sep 2023 06:00 )    Color: x / Appearance: x / SG: x / pH: x  Gluc: 93 mg/dL / Ketone: x  / Bili: x / Urobili: x   Blood: x / Protein: x / Nitrite: x   Leuk Esterase: x / RBC: x / WBC x   Sq Epi: x / Non Sq Epi: x / Bacteria: x      
Resting    Vital Signs Last 24 Hrs  T(C): 36.7 (09-23-23 @ 21:16), Max: 37.1 (09-23-23 @ 13:33)  T(F): 98.1 (09-23-23 @ 21:16), Max: 98.7 (09-23-23 @ 13:33)  HR: 80 (09-23-23 @ 21:16) (71 - 84)  BP: 151/74 (09-23-23 @ 21:16) (128/64 - 151/74)  RR: 17 (09-23-23 @ 21:16) (16 - 17)  SpO2: 94% (09-23-23 @ 21:16) (94% - 96%)    I&O's Detail    22 Sep 2023 07:01  -  23 Sep 2023 07:00  --------------------------------------------------------  OUT:    Voided (mL): 800 mL  Total OUT: 800 mL    Respiratory: b/l air entry  Cardiovascular: S1 S2  Gastrointestinal: soft, ND  Extremities:  no edema                        8.7    4.52  )-----------( 168      ( 23 Sep 2023 06:12 )             25.9     23 Sep 2023 06:12    143    |  110    |  39     ----------------------------<  94     3.9     |  27     |  2.60     Ca    9.9        23 Sep 2023 06:12  Mg     1.7       23 Sep 2023 06:12    acetaminophen     Tablet .. 650 milliGRAM(s) Oral every 6 hours PRN  allopurinol 100 milliGRAM(s) Oral daily  cyanocobalamin Injectable 1000 MICROGram(s) IntraMuscular daily  folic acid 1 milliGRAM(s) Oral daily  heparin   Injectable 5000 Unit(s) SubCutaneous every 12 hours  lidocaine   4% Patch 1 Patch Transdermal every 24 hours  pantoprazole    Tablet 40 milliGRAM(s) Oral every 12 hours  polyethylene glycol 3350 17 Gram(s) Oral daily  sodium chloride 0.45%. 1000 milliLiter(s) IV Continuous <Continuous>  tamsulosin 0.4 milliGRAM(s) Oral at bedtime    Assessment and Plan:     WILY, anemia, pos SPEP, UPEP  Suspected MM  S/p bone marrow bx  Await results   WILY improving  Continue IVF  Avoid nephrotoxins  F/u BMP, CBC  Further plans pending results of bm bx  D/w family at bedside    [phone]  
[INTERVAL HX: ]  Patient seen and examined;  Chart reviewed and events noted;     no CP, no SOB  c/o back pain.     urine studies with bence slick protein and elevated light chain.   Concern for MM or light chain disease.     d/w Dr Sweet, renal and later with dtr and IR.       [MEDICATIONS]  MEDICATIONS  (STANDING):  allopurinol 100 milliGRAM(s) Oral daily  cyanocobalamin Injectable 1000 MICROGram(s) IntraMuscular daily  folic acid 1 milliGRAM(s) Oral daily  lidocaine   4% Patch 1 Patch Transdermal every 24 hours  pantoprazole    Tablet 40 milliGRAM(s) Oral every 12 hours  polyethylene glycol 3350 17 Gram(s) Oral daily  sodium chloride 0.45%. 1000 milliLiter(s) (75 mL/Hr) IV Continuous <Continuous>  tamsulosin 0.4 milliGRAM(s) Oral at bedtime    MEDICATIONS  (PRN):  acetaminophen     Tablet .. 650 milliGRAM(s) Oral every 6 hours PRN Mild Pain (1 - 3)      [VITALS]  Vital Signs Last 24 Hrs  T(C): 36.8 (20 Sep 2023 11:56), Max: 37.1 (19 Sep 2023 20:45)  T(F): 98.3 (20 Sep 2023 11:56), Max: 98.7 (19 Sep 2023 20:45)  HR: 88 (20 Sep 2023 11:56) (76 - 88)  BP: 110/64 (20 Sep 2023 11:56) (110/64 - 128/64)  BP(mean): --  RR: 19 (20 Sep 2023 11:56) (18 - 19)  SpO2: 98% (20 Sep 2023 11:56) (96% - 98%)    Parameters below as of 20 Sep 2023 11:56  Patient On (Oxygen Delivery Method): room air      [WT/HT]  Daily     Daily Weight in k.4 (20 Sep 2023 04:34)  [VENT]      [PHYSICAL EXAM]  GEN: NAD  HEENT: normocephalic and atraumatic. EOMI. .    NECK: Supple.  No lymphadenopathy   LUNGS: Clear to auscultation.  HEART: Regular rate and rhythm,  no MRG  ABDOMEN: Soft, nontender, and nondistended.  Positive bowel sounds.    : No CVA tenderness  EXTREMITIES: Without edema.  NEUROLOGIC: grossly intact.  PSYCHIATRIC: Appropriate affect .  SKIN: No rash     [LABS:]                        7.8    4.72  )-----------( 176      ( 20 Sep 2023 08:20 )             23.6         142  |  111<H>  |  46<H>  ----------------------------<  100<H>  4.4   |  25  |  4.30<H>    Ca    9.9      20 Sep 2023 08:20  Phos  4.7       Mg     1.7         TPro  6.3  /  Alb  3.0<L>  /  TBili  0.3  /  DBili  x   /  AST  14<L>  /  ALT  23  /  AlkPhos  56            Sedimentation Rate, Erythrocyte: 63 mm/hr *H* [0 - 20] (23 @ 12:45)    Folate, Serum: 5.7 ng/mL (23 @ 07:08)    Vitamin B12, Serum: 315 pg/mL [232 - 1245] (23 @ 07:08)    Ferritin: 686 ng/mL *H* [30 - 400] (23 @ 07:08)    Iron - Total Binding Capacity.: 223 ug/dL [220 - 430] (23 @ 07:08)    Reticulocyte Count (23 @ 07:08)  Reticulocyte Percent: 1.0 % [0.5 - 2.5]  Absolute Reticulocytes: 28.0 K/uL [25.0 - 125.0]    Protein Electrophoresis, Urine (23 @ 22:00)  Immunofixation, Urine: Two Bence Ruelas protein Kappa type  Urine Electrophoresis Interpretation: Two Gamma-Migrating Paraproteins Identified      Urinalysis Basic - ( 20 Sep 2023 08:20 )    Color: x / Appearance: x / SG: x / pH: x  Gluc: 100 mg/dL / Ketone: x  / Bili: x / Urobili: x   Blood: x / Protein: x / Nitrite: x   Leuk Esterase: x / RBC: x / WBC x   Sq Epi: x / Non Sq Epi: x / Bacteria: x        COVID-19 PCR: NotDetec (17 Sep 2023 12:45)          [RADIOLOGY STUDIES:]    
CHIEF COMPLAINT/INTERVAL HISTORY:  Pt. seen and evaluated for gastric tumor and acute kidney injury.  Pt. is in no distress. Reports that his chest feels cold.  Also reported having constipation.      REVIEW OF SYSTEMS:  No fever, CP, SOB, or abdominal pain.      MEDICATIONS  (STANDING):  allopurinol 100 milliGRAM(s) Oral daily  amLODIPine   Tablet 5 milliGRAM(s) Oral daily  cyanocobalamin Injectable 1000 MICROGram(s) IntraMuscular daily  folic acid 1 milliGRAM(s) Oral daily  heparin   Injectable 5000 Unit(s) SubCutaneous every 12 hours  lidocaine   4% Patch 1 Patch Transdermal every 24 hours  pantoprazole    Tablet 40 milliGRAM(s) Oral every 12 hours  polyethylene glycol 3350 17 Gram(s) Oral daily  senna 2 Tablet(s) Oral at bedtime  sodium chloride 0.45%. 1000 milliLiter(s) (75 mL/Hr) IV Continuous <Continuous>  tamsulosin 0.4 milliGRAM(s) Oral at bedtime    MEDICATIONS  (PRN):  acetaminophen     Tablet .. 650 milliGRAM(s) Oral every 6 hours PRN Mild Pain (1 - 3)      Vital Signs Last 24 Hrs  T(C): 36.8 (25 Sep 2023 11:41), Max: 36.8 (24 Sep 2023 20:48)  T(F): 98.2 (25 Sep 2023 11:41), Max: 98.3 (24 Sep 2023 20:48)  HR: 91 (25 Sep 2023 11:41) (73 - 91)  BP: 115/66 (25 Sep 2023 11:41) (115/66 - 129/69)  BP(mean): --  RR: 19 (25 Sep 2023 11:41) (18 - 19)  SpO2: 95% (25 Sep 2023 11:41) (93% - 95%)    Parameters below as of 25 Sep 2023 11:41  Patient On (Oxygen Delivery Method): room air        PHYSICAL EXAM:  GENERAL: NAD  HEENT: EOMI, hearing normal, conjunctiva and sclera clear  Chest: CTA bilaterally, no wheezing  CV: S1S2, RRR,   GI: soft, +BS, NT/ND  Musculoskeletal: no edema  Psychiatric: affect nL, mood nL  Skin: warm and dry    LABS:                        8.4    5.11  )-----------( 185      ( 25 Sep 2023 05:55 )             25.5     25 Sep 2023 05:55    142    |  110    |  30     ----------------------------<  94     4.1     |  27     |  2.30     Ca    10.3       25 Sep 2023 05:55  Phos  3.8       25 Sep 2023 05:55  Mg     1.6       25 Sep 2023 05:55        Urinalysis Basic - ( 25 Sep 2023 05:55 )    Color: x / Appearance: x / SG: x / pH: x  Gluc: 94 mg/dL / Ketone: x  / Bili: x / Urobili: x   Blood: x / Protein: x / Nitrite: x   Leuk Esterase: x / RBC: x / WBC x   Sq Epi: x / Non Sq Epi: x / Bacteria: x      CAPILLARY BLOOD GLUCOSE        UCx       RADIOLOGY & ADDITIONAL TESTS:      
INTERVAL HPI/OVERNIGHT EVENTS:      no n/v, no rectal bleeding, no abdominal pain    MEDICATIONS  (STANDING):  allopurinol 100 milliGRAM(s) Oral daily  cyanocobalamin 1000 MICROGram(s) Oral once  heparin   Injectable 5000 Unit(s) SubCutaneous every 12 hours  lidocaine   4% Patch 1 Patch Transdermal every 24 hours  pantoprazole    Tablet 40 milliGRAM(s) Oral every 12 hours  polyethylene glycol 3350 17 Gram(s) Oral daily  sodium chloride 0.45%. 1000 milliLiter(s) (75 mL/Hr) IV Continuous <Continuous>  tamsulosin 0.4 milliGRAM(s) Oral at bedtime    MEDICATIONS  (PRN):  acetaminophen     Tablet .. 650 milliGRAM(s) Oral every 6 hours PRN Mild Pain (1 - 3)      Allergies    No Known Allergies    Intolerances        Review of Systems:    General:  No wt loss, fevers, chills, night sweats, fatigue   Eyes:  Good vision, no reported pain  ENT:  No sore throat, pain, runny nose, dysphagia  CV:  No pain, palpitations, hypo/hypertension  Resp:  No dyspnea, cough, tachypnea, wheezing  GI:  No pain, No nausea, No vomiting, No diarrhea, No constipation, No weight loss, No fever, No pruritis, No rectal bleeding, No melena, No dysphagia  :  No pain, bleeding, incontinence, nocturia  Muscle:  No pain, weakness  Neuro:  No weakness, tingling, memory problems  Psych:  No fatigue, insomnia, mood problems, depression  Endocrine:  No polyuria, polydypsia, cold/heat intolerance  Heme:  No petechiae, ecchymosis, easy bruisability  Skin:  No rash, tattoos, scars, edema      Vital Signs Last 24 Hrs  T(C): 36.4 (19 Sep 2023 05:01), Max: 36.8 (18 Sep 2023 12:31)  T(F): 97.5 (19 Sep 2023 05:01), Max: 98.3 (18 Sep 2023 12:31)  HR: 78 (19 Sep 2023 05:01) (78 - 107)  BP: 111/60 (19 Sep 2023 05:01) (108/57 - 114/69)  BP(mean): --  RR: 18 (19 Sep 2023 05:01) (15 - 18)  SpO2: 95% (19 Sep 2023 05:01) (95% - 100%)    Parameters below as of 19 Sep 2023 05:01  Patient On (Oxygen Delivery Method): room air        PHYSICAL EXAM:    Constitutional: NAD  HEENT: EOMI, throat clear  Neck: No LAD, supple  Respiratory: CTA and P  Cardiovascular: S1 and S2, RRR, no M  Gastrointestinal: BS+, soft, NT/ND, neg HSM,  Extremities: No peripheral edema, neg clubbing, cyanosis  Vascular: 2+ peripheral pulses  Neurological: A/O x 3, no focal deficits  Psychiatric: Normal mood, normal affect  Skin: No rashes      LABS:                        8.2    4.64  )-----------( 171      ( 19 Sep 2023 07:22 )             25.0     09-19    145  |  112<H>  |  51<H>  ----------------------------<  98  4.5   |  26  |  4.70<H>    Ca    9.6      19 Sep 2023 07:22  Phos  4.7     09-19  Mg     1.7     09-19    TPro  6.1  /  Alb  3.1<L>  /  TBili  0.3  /  DBili  x   /  AST  16  /  ALT  25  /  AlkPhos  53  09-19    PT/INR - ( 17 Sep 2023 12:45 )   PT: 11.1 sec;   INR: 0.95 ratio         PTT - ( 17 Sep 2023 12:45 )  PTT:31.5 sec  Urinalysis Basic - ( 19 Sep 2023 07:22 )    Color: x / Appearance: x / SG: x / pH: x  Gluc: 98 mg/dL / Ketone: x  / Bili: x / Urobili: x   Blood: x / Protein: x / Nitrite: x   Leuk Esterase: x / RBC: x / WBC x   Sq Epi: x / Non Sq Epi: x / Bacteria: x        RADIOLOGY & ADDITIONAL TESTS:  
INTERVAL HPI/OVERNIGHT EVENTS:    denies n/v/d/c, abdominal pain, melena or brbpr     MEDICATIONS  (STANDING):  allopurinol 100 milliGRAM(s) Oral daily  amLODIPine   Tablet 5 milliGRAM(s) Oral daily  cyanocobalamin Injectable 1000 MICROGram(s) IntraMuscular daily  folic acid 1 milliGRAM(s) Oral daily  heparin   Injectable 5000 Unit(s) SubCutaneous every 12 hours  lidocaine   4% Patch 1 Patch Transdermal every 24 hours  pantoprazole    Tablet 40 milliGRAM(s) Oral every 12 hours  polyethylene glycol 3350 17 Gram(s) Oral daily  senna 2 Tablet(s) Oral at bedtime  sodium chloride 0.45%. 1000 milliLiter(s) (75 mL/Hr) IV Continuous <Continuous>  tamsulosin 0.4 milliGRAM(s) Oral at bedtime    MEDICATIONS  (PRN):  acetaminophen     Tablet .. 650 milliGRAM(s) Oral every 6 hours PRN Mild Pain (1 - 3)      Allergies    No Known Allergies    Intolerances        Review of Systems:    General:  No wt loss, fevers, chills, night sweats, fatigue   Eyes:  Good vision, no reported pain  ENT:  No sore throat, pain, runny nose, dysphagia  CV:  No pain, palpitations, hypo/hypertension  Resp:  No dyspnea, cough, tachypnea, wheezing  GI:  No pain, No nausea, No vomiting, No diarrhea, No constipation, No weight loss, No fever, No pruritis, No rectal bleeding, No melena, No dysphagia  :  No pain, bleeding, incontinence, nocturia  Muscle:  No pain, weakness  Neuro:  No weakness, tingling, memory problems  Psych:  No fatigue, insomnia, mood problems, depression  Endocrine:  No polyuria, polydypsia, cold/heat intolerance  Heme:  No petechiae, ecchymosis, easy bruisability  Skin:  No rash, tattoos, scars, edema      Vital Signs Last 24 Hrs  T(C): 36.8 (25 Sep 2023 11:41), Max: 36.8 (24 Sep 2023 20:48)  T(F): 98.2 (25 Sep 2023 11:41), Max: 98.3 (24 Sep 2023 20:48)  HR: 91 (25 Sep 2023 11:41) (73 - 91)  BP: 115/66 (25 Sep 2023 11:41) (115/66 - 129/69)  BP(mean): --  RR: 19 (25 Sep 2023 11:41) (18 - 19)  SpO2: 95% (25 Sep 2023 11:41) (93% - 95%)    Parameters below as of 25 Sep 2023 11:41  Patient On (Oxygen Delivery Method): room air        PHYSICAL EXAM:    Constitutional: NAD  HEENT: EOMI, throat clear  Neck: No LAD, supple  Respiratory: CTA and P  Cardiovascular: S1 and S2, RRR, no M  Gastrointestinal: BS+, soft, NT/ND, neg HSM,  Extremities: No peripheral edema, neg clubbing, cyanosis  Vascular: 2+ peripheral pulses  Neurological: A/O x 3, no focal deficits  Psychiatric: Normal mood, normal affect  Skin: No rashes      LABS:                        8.4    5.11  )-----------( 185      ( 25 Sep 2023 05:55 )             25.5     09-25    142  |  110<H>  |  30<H>  ----------------------------<  94  4.1   |  27  |  2.30<H>    Ca    10.3<H>      25 Sep 2023 05:55  Phos  3.8     09-25  Mg     1.6     09-25        Urinalysis Basic - ( 25 Sep 2023 05:55 )    Color: x / Appearance: x / SG: x / pH: x  Gluc: 94 mg/dL / Ketone: x  / Bili: x / Urobili: x   Blood: x / Protein: x / Nitrite: x   Leuk Esterase: x / RBC: x / WBC x   Sq Epi: x / Non Sq Epi: x / Bacteria: x        RADIOLOGY & ADDITIONAL TESTS:  
Interval History:  no new complaints  Chart reviewed and events noted;   Overnight events:    MEDICATIONS  (STANDING):  allopurinol 100 milliGRAM(s) Oral daily  cyanocobalamin Injectable 1000 MICROGram(s) IntraMuscular daily  folic acid 1 milliGRAM(s) Oral daily  heparin   Injectable 5000 Unit(s) SubCutaneous every 12 hours  lidocaine   4% Patch 1 Patch Transdermal every 24 hours  pantoprazole    Tablet 40 milliGRAM(s) Oral every 12 hours  polyethylene glycol 3350 17 Gram(s) Oral daily  sodium chloride 0.45%. 1000 milliLiter(s) (50 mL/Hr) IV Continuous <Continuous>  tamsulosin 0.4 milliGRAM(s) Oral at bedtime    MEDICATIONS  (PRN):  acetaminophen     Tablet .. 650 milliGRAM(s) Oral every 6 hours PRN Mild Pain (1 - 3)      Vital Signs Last 24 Hrs  T(C): 37.1 (23 Sep 2023 13:33), Max: 37.1 (23 Sep 2023 13:33)  T(F): 98.7 (23 Sep 2023 13:33), Max: 98.7 (23 Sep 2023 13:33)  HR: 84 (23 Sep 2023 13:33) (71 - 84)  BP: 128/64 (23 Sep 2023 13:33) (128/64 - 128/70)  BP(mean): --  RR: 16 (23 Sep 2023 13:33) (16 - 16)  SpO2: 96% (23 Sep 2023 13:33) (95% - 96%)    Parameters below as of 23 Sep 2023 13:33  Patient On (Oxygen Delivery Method): room air        PHYSICAL EXAM  General: adult in NAD  HEENT: clear oropharynx, anicteric sclera, pink conjunctivae  Neck: supple  CV: normal S1S2 with no murmur rubs or gallops  Lungs: clear to auscultation, no wheezes, no rhales  Abdomen: soft non-tender non-distended, no hepato/splenomegaly  Ext: no clubbing cyanosis or edema  Skin: no rashes and no petichiae  Neuro: alert and oriented X3 no focal deficits      LABS:  CBC Full  -  ( 23 Sep 2023 06:12 )  WBC Count : 4.52 K/uL  RBC Count : 2.89 M/uL  Hemoglobin : 8.7 g/dL  Hematocrit : 25.9 %  Platelet Count - Automated : 168 K/uL  Mean Cell Volume : 89.6 fl  Mean Cell Hemoglobin : 30.1 pg  Mean Cell Hemoglobin Concentration : 33.6 gm/dL  Auto Neutrophil # : 2.04 K/uL  Auto Lymphocyte # : 1.95 K/uL  Auto Monocyte # : 0.32 K/uL  Auto Eosinophil # : 0.13 K/uL  Auto Basophil # : 0.02 K/uL  Auto Neutrophil % : 45.2 %  Auto Lymphocyte % : 43.1 %  Auto Monocyte % : 7.1 %  Auto Eosinophil % : 2.9 %  Auto Basophil % : 0.4 %    09-23    143  |  110<H>  |  39<H>  ----------------------------<  94  3.9   |  27  |  2.60<H>    Ca    9.9      23 Sep 2023 06:12  Mg     1.7     09-23          fe studies      WBC trend  4.52 K/uL (09-23-23 @ 06:12)  4.74 K/uL (09-22-23 @ 06:00)  4.52 K/uL (09-21-23 @ 05:30)      Hgb trend  8.7 g/dL (09-23-23 @ 06:12)  8.3 g/dL (09-22-23 @ 06:00)  7.6 g/dL (09-21-23 @ 05:30)      plt trend  168 K/uL (09-23-23 @ 06:12)  155 K/uL (09-22-23 @ 06:00)  157 K/uL (09-21-23 @ 05:30)        RADIOLOGY & ADDITIONAL STUDIES:    
Interval History:  no new complaints  Chart reviewed and events noted;   Overnight events:    MEDICATIONS  (STANDING):  allopurinol 100 milliGRAM(s) Oral daily  cyanocobalamin Injectable 1000 MICROGram(s) IntraMuscular daily  folic acid 1 milliGRAM(s) Oral daily  lidocaine   4% Patch 1 Patch Transdermal every 24 hours  pantoprazole    Tablet 40 milliGRAM(s) Oral every 12 hours  polyethylene glycol 3350 17 Gram(s) Oral daily  sodium chloride 0.45%. 1000 milliLiter(s) (50 mL/Hr) IV Continuous <Continuous>  tamsulosin 0.4 milliGRAM(s) Oral at bedtime    MEDICATIONS  (PRN):  acetaminophen     Tablet .. 650 milliGRAM(s) Oral every 6 hours PRN Mild Pain (1 - 3)      Vital Signs Last 24 Hrs  T(C): 36.7 (22 Sep 2023 11:38), Max: 36.7 (22 Sep 2023 11:38)  T(F): 98 (22 Sep 2023 11:38), Max: 98 (22 Sep 2023 11:38)  HR: 86 (22 Sep 2023 11:38) (68 - 86)  BP: 150/71 (22 Sep 2023 11:38) (131/69 - 150/71)  BP(mean): --  RR: 17 (22 Sep 2023 11:38) (16 - 17)  SpO2: 95% (22 Sep 2023 11:38) (95% - 96%)    Parameters below as of 22 Sep 2023 11:38  Patient On (Oxygen Delivery Method): room air        PHYSICAL EXAM  General: adult in NAD  HEENT: clear oropharynx, anicteric sclera, pink conjunctivae  Neck: supple  CV: normal S1S2 with no murmur rubs or gallops  Lungs: clear to auscultation, no wheezes, no rhales  Abdomen: soft non-tender non-distended, no hepato/splenomegaly  Ext: no clubbing cyanosis or edema  Skin: no rashes and no petichiae  Neuro: alert and oriented X3 no focal deficits      LABS:  CBC Full  -  ( 22 Sep 2023 06:00 )  WBC Count : 4.74 K/uL  RBC Count : 2.68 M/uL  Hemoglobin : 8.3 g/dL  Hematocrit : 23.8 %  Platelet Count - Automated : 155 K/uL  Mean Cell Volume : 88.8 fl  Mean Cell Hemoglobin : 31.0 pg  Mean Cell Hemoglobin Concentration : 34.9 gm/dL  Auto Neutrophil # : 2.10 K/uL  Auto Lymphocyte # : 2.15 K/uL  Auto Monocyte # : 0.28 K/uL  Auto Eosinophil # : 0.10 K/uL  Auto Basophil # : 0.03 K/uL  Auto Neutrophil % : 44.3 %  Auto Lymphocyte % : 45.4 %  Auto Monocyte % : 5.9 %  Auto Eosinophil % : 2.1 %  Auto Basophil % : 0.6 %    09-22    142  |  110<H>  |  40<H>  ----------------------------<  93  3.8   |  25  |  3.20<H>    Ca    9.5      22 Sep 2023 06:00  Phos  4.0     09-21  Mg     2.0     09-22          fe studies  Ferritin: 686 ng/mL (09-18 @ 07:08)  Iron - Total Binding Capacity.: 223 ug/dL (09-18 @ 07:08)      WBC trend  4.74 K/uL (09-22-23 @ 06:00)  4.52 K/uL (09-21-23 @ 05:30)  4.72 K/uL (09-20-23 @ 08:20)      Hgb trend  8.3 g/dL (09-22-23 @ 06:00)  7.6 g/dL (09-21-23 @ 05:30)  7.8 g/dL (09-20-23 @ 08:20)      plt trend  155 K/uL (09-22-23 @ 06:00)  157 K/uL (09-21-23 @ 05:30)  176 K/uL (09-20-23 @ 08:20)        RADIOLOGY & ADDITIONAL STUDIES:    
Renal consult called for WILY. Chart reviewed. Labs as ordered.   No evidence of obstructive uropathy on CT scan. Monitor creatinine trend post IV contrast study.   Continue IV hydration. Full consult to follow. 
[facility] -- Peter Piña Pannella, Patel, Savella, Goodger, Cohen  Office # [phone]    Follow Up:  Cardiac clearance, HTN    Subjective/Observations: seen and examined, awake, alert, laying flat in bed, denies chest pain, dyspnea, palpitations or dizziness, orthopnea and PND. Tolerating room air. IVF infusing     REVIEW OF SYSTEMS: All other review of systems is negative unless indicated above  PAST MEDICAL & SURGICAL HISTORY:  HTN (hypertension)      BPH (benign prostatic hyperplasia)      History of appendectomy        MEDICATIONS  (STANDING):  allopurinol 100 milliGRAM(s) Oral daily  amLODIPine   Tablet 5 milliGRAM(s) Oral daily  cyanocobalamin Injectable 1000 MICROGram(s) IntraMuscular daily  folic acid 1 milliGRAM(s) Oral daily  heparin   Injectable 5000 Unit(s) SubCutaneous every 12 hours  lactulose Syrup 20 Gram(s) Oral once  lidocaine   4% Patch 1 Patch Transdermal every 24 hours  pantoprazole    Tablet 40 milliGRAM(s) Oral every 12 hours  polyethylene glycol 3350 17 Gram(s) Oral daily  senna 2 Tablet(s) Oral at bedtime  sodium chloride 0.45%. 1000 milliLiter(s) (75 mL/Hr) IV Continuous <Continuous>  tamsulosin 0.4 milliGRAM(s) Oral at bedtime    MEDICATIONS  (PRN):  acetaminophen     Tablet .. 650 milliGRAM(s) Oral every 6 hours PRN Mild Pain (1 - 3)    Allergies    No Known Allergies    Intolerances      Vital Signs Last 24 Hrs  T(C): 36.8 (24 Sep 2023 10:07), Max: 37.1 (23 Sep 2023 13:33)  T(F): 98.3 (24 Sep 2023 10:07), Max: 98.7 (23 Sep 2023 13:33)  HR: 89 (24 Sep 2023 10:07) (70 - 89)  BP: 133/68 (24 Sep 2023 10:07) (128/64 - 151/74)  BP(mean): --  RR: 18 (24 Sep 2023 10:07) (16 - 18)  SpO2: 96% (24 Sep 2023 10:07) (94% - 96%)    Parameters below as of 24 Sep 2023 10:07  Patient On (Oxygen Delivery Method): room air      I&O's Summary    23 Sep 2023 07:01  -  24 Sep 2023 07:00  --------------------------------------------------------  IN: 0 mL / OUT: 800 mL / NET: -800 mL        TELE: Not on telemetry   PHYSICAL EXAM:  Constitutional: NAD, awake and alert  HEENT: Moist Mucous Membranes, Anicteric  Pulmonary: Non-labored, breath sounds are clear bilaterally, No wheezing, rales or rhonchi  Cardiovascular: Regular, S1 and S2, No murmurs, rubs, gallops or clicks  Gastrointestinal: Bowel Sounds present, soft, nontender.   Lymph: No peripheral edema. No lymphadenopathy.  Skin: No visible rashes or ulcers.  Psych:  Mood & affect appropriate  LABS: All Labs Reviewed:                        8.5    4.70  )-----------( 183      ( 24 Sep 2023 06:00 )             26.0                         8.7    4.52  )-----------( 168      ( 23 Sep 2023 06:12 )             25.9                         8.3    4.74  )-----------( 155      ( 22 Sep 2023 06:00 )             23.8     24 Sep 2023 06:00    141    |  108    |  36     ----------------------------<  93     3.8     |  27     |  2.40   23 Sep 2023 06:12    143    |  110    |  39     ----------------------------<  94     3.9     |  27     |  2.60   22 Sep 2023 06:00    142    |  110    |  40     ----------------------------<  93     3.8     |  25     |  3.20     Ca    9.9        24 Sep 2023 06:00  Ca    9.9        23 Sep 2023 06:12  Ca    9.5        22 Sep 2023 06:00  Mg     1.7       23 Sep 2023 06:12  Mg     2.0       22 Sep 2023 06:00            12 Lead ECG:   Ventricular Rate 76 BPM    Atrial Rate 76 BPM    P-R Interval 146 ms    QRS Duration 90 ms    Q-T Interval 366 ms    QTC Calculation(Bazett) 411 ms    P Axis 47 degrees    R Axis -19 degrees    T Axis 34 degrees    Diagnosis Line Normal sinus rhythm  Normal ECG  When compared with ECG of 07-JAN-2022 10:24,  No significant change was found  Confirmed by Chantal Metz ([id number]) on 9/17/2023 2:37:37 PM (09-17-23 @ 14:05)       
s/p  upper gastrointestinal endoscopy    large sub mucosal lesion noted in the gastric cardia  GE jn noted at 36cm from the incisors  clean based duodenal ulcer    rec:   reg diet   proton pump inhibitor bid  outpatient eus  d/w dtr
[facility] -- Peter Piña Pannella, Patel, Savella, Goodger, Cohen  Office # [phone]    Follow Up:  Cardiac clearance, HTN    Subjective/Observations: seen and examined, awake, alert, laying flat in bed, denies chest pain, dyspnea, palpitations or dizziness, orthopnea and PND. Tolerating room air. IVF infusing     REVIEW OF SYSTEMS: All other review of systems is negative unless indicated above  PAST MEDICAL & SURGICAL HISTORY:  HTN (hypertension)      BPH (benign prostatic hyperplasia)      History of appendectomy        MEDICATIONS  (STANDING):  allopurinol 100 milliGRAM(s) Oral daily  amLODIPine   Tablet 5 milliGRAM(s) Oral daily  cyanocobalamin Injectable 1000 MICROGram(s) IntraMuscular daily  folic acid 1 milliGRAM(s) Oral daily  heparin   Injectable 5000 Unit(s) SubCutaneous every 12 hours  lidocaine   4% Patch 1 Patch Transdermal every 24 hours  pantoprazole    Tablet 40 milliGRAM(s) Oral every 12 hours  polyethylene glycol 3350 17 Gram(s) Oral daily  senna 2 Tablet(s) Oral at bedtime  sodium chloride 0.45%. 1000 milliLiter(s) (75 mL/Hr) IV Continuous <Continuous>  tamsulosin 0.4 milliGRAM(s) Oral at bedtime    MEDICATIONS  (PRN):  acetaminophen     Tablet .. 650 milliGRAM(s) Oral every 6 hours PRN Mild Pain (1 - 3)    Allergies    No Known Allergies    Intolerances      Vital Signs Last 24 Hrs  T(C): 36.4 (25 Sep 2023 05:19), Max: 36.8 (24 Sep 2023 20:48)  T(F): 97.6 (25 Sep 2023 05:19), Max: 98.3 (24 Sep 2023 20:48)  HR: 73 (25 Sep 2023 05:19) (73 - 84)  BP: 128/72 (25 Sep 2023 05:19) (126/57 - 129/69)  BP(mean): --  RR: 19 (25 Sep 2023 05:19) (18 - 19)  SpO2: 94% (25 Sep 2023 05:19) (93% - 94%)    Parameters below as of 25 Sep 2023 05:19  Patient On (Oxygen Delivery Method): room air      I&O's Summary        TELE: Not on telemetry   PHYSICAL EXAM:  Constitutional: NAD, awake and alert  HEENT: Moist Mucous Membranes, Anicteric  Pulmonary: Non-labored, breath sounds are clear bilaterally, No wheezing, rales or rhonchi  Cardiovascular: Regular, S1 and S2, No murmurs, rubs, gallops or clicks  Gastrointestinal: Bowel Sounds present, soft, nontender.   Lymph: No peripheral edema. No lymphadenopathy.  Skin: No visible rashes or ulcers.  Psych:  Mood & affect appropriate  LABS: All Labs Reviewed:                                   8.4    5.11  )-----------( 185      ( 25 Sep 2023 05:55 )             25.5                         8.5    4.70  )-----------( 183      ( 24 Sep 2023 06:00 )             26.0                         8.7    4.52  )-----------( 168      ( 23 Sep 2023 06:12 )             25.9     25 Sep 2023 05:55    142    |  110    |  30     ----------------------------<  94     4.1     |  27     |  2.30   24 Sep 2023 06:00    141    |  108    |  36     ----------------------------<  93     3.8     |  27     |  2.40   23 Sep 2023 06:12    143    |  110    |  39     ----------------------------<  94     3.9     |  27     |  2.60     Ca    10.3       25 Sep 2023 05:55  Ca    9.9        24 Sep 2023 06:00  Ca    9.9        23 Sep 2023 06:12  Phos  3.8       25 Sep 2023 05:55  Mg     1.6       25 Sep 2023 05:55  Mg     1.7       23 Sep 2023 06:12            12 Lead ECG:   Ventricular Rate 76 BPM    Atrial Rate 76 BPM    P-R Interval 146 ms    QRS Duration 90 ms    Q-T Interval 366 ms    QTC Calculation(Bazett) 411 ms    P Axis 47 degrees    R Axis -19 degrees    T Axis 34 degrees    Diagnosis Line Normal sinus rhythm  Normal ECG  When compared with ECG of 07-JAN-2022 10:24,  No significant change was found  Confirmed by Chantal Metz ([id number]) on 9/17/2023 2:37:37 PM (09-17-23 @ 14:05)       
CHIEF COMPLAINT/INTERVAL HISTORY:  Pt. seen and evaluated for gastric tumor and WILY.  Pt. is in no distress.  Denies having any pain or SOB.  Denies having any gross bleeding.      REVIEW OF SYSTEMS:  No fever, CP, SOB, or abdominal pain.    Vital Signs Last 24 Hrs  T(C): 36.8 (20 Sep 2023 11:56), Max: 37.1 (19 Sep 2023 20:45)  T(F): 98.3 (20 Sep 2023 11:56), Max: 98.7 (19 Sep 2023 20:45)  HR: 88 (20 Sep 2023 11:56) (76 - 88)  BP: 110/64 (20 Sep 2023 11:56) (110/64 - 128/64)  BP(mean): --  RR: 19 (20 Sep 2023 11:56) (18 - 19)  SpO2: 98% (20 Sep 2023 11:56) (96% - 98%)    Parameters below as of 20 Sep 2023 11:56  Patient On (Oxygen Delivery Method): room air        PHYSICAL EXAM:  GENERAL: NAD  HEENT: EOMI, hearing normal, conjunctiva and sclera clear  Chest: CTA bilaterally, no wheezing  CV: S1S2, RRR,   GI: soft, +BS, NT/ND  Musculoskeletal: no edema  Psychiatric: affect nL, mood nL  Skin: warm and dry    LABS:                        7.8    4.72  )-----------( 176      ( 20 Sep 2023 08:20 )             23.6     09-20    142  |  111<H>  |  46<H>  ----------------------------<  100<H>  4.4   |  25  |  4.30<H>    Ca    9.9      20 Sep 2023 08:20  Phos  4.7     09-19  Mg     1.7     09-19    TPro  6.3  /  Alb  3.0<L>  /  TBili  0.3  /  DBili  x   /  AST  14<L>  /  ALT  23  /  AlkPhos  56  09-20      Urinalysis Basic - ( 20 Sep 2023 08:20 )    Color: x / Appearance: x / SG: x / pH: x  Gluc: 100 mg/dL / Ketone: x  / Bili: x / Urobili: x   Blood: x / Protein: x / Nitrite: x   Leuk Esterase: x / RBC: x / WBC x   Sq Epi: x / Non Sq Epi: x / Bacteria: x        
INTERVAL HPI/OVERNIGHT EVENTS:    daughter bedside   low appetite   no n/v, no rectal bleeding, no abdominal pain    MEDICATIONS  (STANDING):  allopurinol 100 milliGRAM(s) Oral daily  cyanocobalamin 1000 MICROGram(s) Oral once  heparin   Injectable 5000 Unit(s) SubCutaneous every 12 hours  lidocaine   4% Patch 1 Patch Transdermal every 24 hours  pantoprazole    Tablet 40 milliGRAM(s) Oral every 12 hours  polyethylene glycol 3350 17 Gram(s) Oral daily  sodium chloride 0.45%. 1000 milliLiter(s) (75 mL/Hr) IV Continuous <Continuous>  tamsulosin 0.4 milliGRAM(s) Oral at bedtime    MEDICATIONS  (PRN):  acetaminophen     Tablet .. 650 milliGRAM(s) Oral every 6 hours PRN Mild Pain (1 - 3)      Allergies    No Known Allergies    Intolerances        Review of Systems:    General:  No wt loss, fevers, chills, night sweats, fatigue   Eyes:  Good vision, no reported pain  ENT:  No sore throat, pain, runny nose, dysphagia  CV:  No pain, palpitations, hypo/hypertension  Resp:  No dyspnea, cough, tachypnea, wheezing  GI:  No pain, No nausea, No vomiting, No diarrhea, No constipation, No weight loss, No fever, No pruritis, No rectal bleeding, No melena, No dysphagia  :  No pain, bleeding, incontinence, nocturia  Muscle:  No pain, weakness  Neuro:  No weakness, tingling, memory problems  Psych:  No fatigue, insomnia, mood problems, depression  Endocrine:  No polyuria, polydypsia, cold/heat intolerance  Heme:  No petechiae, ecchymosis, easy bruisability  Skin:  No rash, tattoos, scars, edema      Vital Signs Last 24 Hrs  T(C): 36.4 (19 Sep 2023 05:01), Max: 36.8 (18 Sep 2023 12:31)  T(F): 97.5 (19 Sep 2023 05:01), Max: 98.3 (18 Sep 2023 12:31)  HR: 78 (19 Sep 2023 05:01) (78 - 107)  BP: 111/60 (19 Sep 2023 05:01) (108/57 - 114/69)  BP(mean): --  RR: 18 (19 Sep 2023 05:01) (15 - 18)  SpO2: 95% (19 Sep 2023 05:01) (95% - 100%)    Parameters below as of 19 Sep 2023 05:01  Patient On (Oxygen Delivery Method): room air        PHYSICAL EXAM:    Constitutional: NAD  HEENT: EOMI, throat clear  Neck: No LAD, supple  Respiratory: CTA and P  Cardiovascular: S1 and S2, RRR, no M  Gastrointestinal: BS+, soft, NT/ND, neg HSM,  Extremities: No peripheral edema, neg clubbing, cyanosis  Vascular: 2+ peripheral pulses  Neurological: A/O x 3, no focal deficits  Psychiatric: Normal mood, normal affect  Skin: No rashes      LABS:                        8.2    4.64  )-----------( 171      ( 19 Sep 2023 07:22 )             25.0     09-19    145  |  112<H>  |  51<H>  ----------------------------<  98  4.5   |  26  |  4.70<H>    Ca    9.6      19 Sep 2023 07:22  Phos  4.7     09-19  Mg     1.7     09-19    TPro  6.1  /  Alb  3.1<L>  /  TBili  0.3  /  DBili  x   /  AST  16  /  ALT  25  /  AlkPhos  53  09-19    PT/INR - ( 17 Sep 2023 12:45 )   PT: 11.1 sec;   INR: 0.95 ratio         PTT - ( 17 Sep 2023 12:45 )  PTT:31.5 sec  Urinalysis Basic - ( 19 Sep 2023 07:22 )    Color: x / Appearance: x / SG: x / pH: x  Gluc: 98 mg/dL / Ketone: x  / Bili: x / Urobili: x   Blood: x / Protein: x / Nitrite: x   Leuk Esterase: x / RBC: x / WBC x   Sq Epi: x / Non Sq Epi: x / Bacteria: x        RADIOLOGY & ADDITIONAL TESTS:  
INTERVAL HPI/OVERNIGHT EVENTS:    no rectal bleeding, no abd pain   s/p bone marrow bx   low po    MEDICATIONS  (STANDING):  allopurinol 100 milliGRAM(s) Oral daily  cyanocobalamin Injectable 1000 MICROGram(s) IntraMuscular daily  folic acid 1 milliGRAM(s) Oral daily  lidocaine   4% Patch 1 Patch Transdermal every 24 hours  magnesium sulfate  IVPB 2 Gram(s) IV Intermittent once  pantoprazole    Tablet 40 milliGRAM(s) Oral every 12 hours  polyethylene glycol 3350 17 Gram(s) Oral daily  sodium chloride 0.45%. 1000 milliLiter(s) (75 mL/Hr) IV Continuous <Continuous>  tamsulosin 0.4 milliGRAM(s) Oral at bedtime    MEDICATIONS  (PRN):  acetaminophen     Tablet .. 650 milliGRAM(s) Oral every 6 hours PRN Mild Pain (1 - 3)      Allergies    No Known Allergies    Intolerances        Review of Systems:    General:  No wt loss, fevers, chills, night sweats, fatigue   Eyes:  Good vision, no reported pain  ENT:  No sore throat, pain, runny nose, dysphagia  CV:  No pain, palpitations, hypo/hypertension  Resp:  No dyspnea, cough, tachypnea, wheezing  GI:  No pain, No nausea, No vomiting, No diarrhea, No constipation, No weight loss, No fever, No pruritis, No rectal bleeding, No melena, No dysphagia  :  No pain, bleeding, incontinence, nocturia  Muscle:  No pain, weakness  Neuro:  No weakness, tingling, memory problems  Psych:  No fatigue, insomnia, mood problems, depression  Endocrine:  No polyuria, polydypsia, cold/heat intolerance  Heme:  No petechiae, ecchymosis, easy bruisability  Skin:  No rash, tattoos, scars, edema      Vital Signs Last 24 Hrs  T(C): 36.7 (21 Sep 2023 09:11), Max: 36.8 (20 Sep 2023 11:56)  T(F): 98.1 (21 Sep 2023 09:11), Max: 98.3 (20 Sep 2023 11:56)  HR: 95 (21 Sep 2023 11:35) (78 - 96)  BP: 108/69 (21 Sep 2023 11:35) (107/59 - 150/76)  BP(mean): 91 (21 Sep 2023 11:15) (79 - 91)  RR: 15 (21 Sep 2023 11:35) (15 - 19)  SpO2: 98% (21 Sep 2023 11:35) (95% - 99%)    Parameters below as of 21 Sep 2023 11:35  Patient On (Oxygen Delivery Method): room air        PHYSICAL EXAM:    Constitutional: NAD  HEENT: EOMI, throat clear  Neck: No LAD, supple  Respiratory: CTA and P  Cardiovascular: S1 and S2, RRR, no M  Gastrointestinal: BS+, soft, NT/ND, neg HSM,  Extremities: No peripheral edema, neg clubbing, cyanosis  Vascular: 2+ peripheral pulses  Neurological: A/O x 3, no focal deficits  Psychiatric: Normal mood, normal affect  Skin: No rashes      LABS:                        7.6    4.52  )-----------( 157      ( 21 Sep 2023 05:30 )             22.7     09-21    142  |  110<H>  |  42<H>  ----------------------------<  97  4.4   |  27  |  3.80<H>    Ca    9.8      21 Sep 2023 05:30  Phos  4.0     09-21  Mg     1.3     09-21    TPro  5.9<L>  /  Alb  x   /  TBili  x   /  DBili  x   /  AST  x   /  ALT  x   /  AlkPhos  x   09-20      Urinalysis Basic - ( 21 Sep 2023 05:30 )    Color: x / Appearance: x / SG: x / pH: x  Gluc: 97 mg/dL / Ketone: x  / Bili: x / Urobili: x   Blood: x / Protein: x / Nitrite: x   Leuk Esterase: x / RBC: x / WBC x   Sq Epi: x / Non Sq Epi: x / Bacteria: x        RADIOLOGY & ADDITIONAL TESTS:  
Interval History:  no new complaints    Chart reviewed and events noted;   Overnight events:    MEDICATIONS  (STANDING):  allopurinol 100 milliGRAM(s) Oral daily  amLODIPine   Tablet 5 milliGRAM(s) Oral daily  cyanocobalamin Injectable 1000 MICROGram(s) IntraMuscular daily  folic acid 1 milliGRAM(s) Oral daily  heparin   Injectable 5000 Unit(s) SubCutaneous every 12 hours  lidocaine   4% Patch 1 Patch Transdermal every 24 hours  pantoprazole    Tablet 40 milliGRAM(s) Oral every 12 hours  polyethylene glycol 3350 17 Gram(s) Oral daily  senna 2 Tablet(s) Oral at bedtime  sodium chloride 0.45%. 1000 milliLiter(s) (75 mL/Hr) IV Continuous <Continuous>  tamsulosin 0.4 milliGRAM(s) Oral at bedtime    MEDICATIONS  (PRN):  acetaminophen     Tablet .. 650 milliGRAM(s) Oral every 6 hours PRN Mild Pain (1 - 3)      Vital Signs Last 24 Hrs  T(C): 36.8 (24 Sep 2023 10:07), Max: 36.8 (24 Sep 2023 10:07)  T(F): 98.3 (24 Sep 2023 10:07), Max: 98.3 (24 Sep 2023 10:07)  HR: 81 (24 Sep 2023 11:20) (70 - 89)  BP: 126/57 (24 Sep 2023 11:20) (126/57 - 151/74)  BP(mean): --  RR: 18 (24 Sep 2023 10:07) (17 - 18)  SpO2: 96% (24 Sep 2023 10:07) (94% - 96%)    Parameters below as of 24 Sep 2023 10:07  Patient On (Oxygen Delivery Method): room air        PHYSICAL EXAM  General: adult in NAD  HEENT: clear oropharynx, anicteric sclera, pink conjunctivae  Neck: supple  CV: normal S1S2 with no murmur rubs or gallops  Lungs: clear to auscultation, no wheezes, no rhales  Abdomen: soft non-tender non-distended, no hepato/splenomegaly  Ext: no clubbing cyanosis or edema  Skin: no rashes and no petichiae  Neuro: alert and oriented X3 no focal deficits      LABS:  CBC Full  -  ( 24 Sep 2023 06:00 )  WBC Count : 4.70 K/uL  RBC Count : 2.85 M/uL  Hemoglobin : 8.5 g/dL  Hematocrit : 26.0 %  Platelet Count - Automated : 183 K/uL  Mean Cell Volume : 91.2 fl  Mean Cell Hemoglobin : 29.8 pg  Mean Cell Hemoglobin Concentration : 32.7 gm/dL  Auto Neutrophil # : 1.83 K/uL  Auto Lymphocyte # : 2.28 K/uL  Auto Monocyte # : 0.36 K/uL  Auto Eosinophil # : 0.11 K/uL  Auto Basophil # : 0.03 K/uL  Auto Neutrophil % : 39.0 %  Auto Lymphocyte % : 48.5 %  Auto Monocyte % : 7.7 %  Auto Eosinophil % : 2.3 %  Auto Basophil % : 0.6 %    09-24    141  |  108  |  36<H>  ----------------------------<  93  3.8   |  27  |  2.40<H>    Ca    9.9      24 Sep 2023 06:00  Mg     1.7     09-23          fe studies      WBC trend  4.70 K/uL (09-24-23 @ 06:00)  4.52 K/uL (09-23-23 @ 06:12)  4.74 K/uL (09-22-23 @ 06:00)      Hgb trend  8.5 g/dL (09-24-23 @ 06:00)  8.7 g/dL (09-23-23 @ 06:12)  8.3 g/dL (09-22-23 @ 06:00)      plt trend  183 K/uL (09-24-23 @ 06:00)  168 K/uL (09-23-23 @ 06:12)  155 K/uL (09-22-23 @ 06:00)        RADIOLOGY & ADDITIONAL STUDIES:    
Patient is a 73y old  Male who presents with a chief complaint of anemia, gastric mass concerning for malignancy, wily (18 Sep 2023 16:24)    Patient seen in follow up for WILY.        PAST MEDICAL HISTORY:  No pertinent past medical history    HTN (hypertension)    BPH (benign prostatic hyperplasia)      MEDICATIONS  (STANDING):  allopurinol 100 milliGRAM(s) Oral daily  heparin   Injectable 5000 Unit(s) SubCutaneous every 12 hours  lidocaine   4% Patch 1 Patch Transdermal every 24 hours  pantoprazole    Tablet 40 milliGRAM(s) Oral every 12 hours  polyethylene glycol 3350 17 Gram(s) Oral daily  sodium chloride 0.45%. 1000 milliLiter(s) (75 mL/Hr) IV Continuous <Continuous>  tamsulosin 0.4 milliGRAM(s) Oral at bedtime    MEDICATIONS  (PRN):  acetaminophen     Tablet .. 650 milliGRAM(s) Oral every 6 hours PRN Mild Pain (1 - 3)    T(C): 37 (09-20-23 @ 04:34), Max: 37.1 (09-19-23 @ 20:45)  HR: 76 (09-20-23 @ 04:34) (76 - 107)  BP: 128/64 (09-20-23 @ 04:34) (108/57 - 128/64)  RR: 18 (09-20-23 @ 04:34)  SpO2: 96% (09-20-23 @ 04:34)  Wt(kg): --  I&O's Detail    20 Sep 2023 07:01  -  20 Sep 2023 10:50  --------------------------------------------------------  IN:  Total IN: 0 mL    OUT:    Voided (mL): 600 mL  Total OUT: 600 mL    Total NET: -600 mL          PHYSICAL EXAM:  General: No distress  Respiratory: b/l air entry  Cardiovascular: S1 S2  Gastrointestinal: soft  Extremities:  no edema                           LABORATORY:                        7.8    4.72  )-----------( 176      ( 20 Sep 2023 08:20 )             23.6     09-20    142  |  111<H>  |  46<H>  ----------------------------<  100<H>  4.4   |  25  |  4.30<H>    Ca    9.9      20 Sep 2023 08:20  Phos  4.7     09-19  Mg     1.7     09-19    TPro  6.3  /  Alb  3.0<L>  /  TBili  0.3  /  DBili  x   /  AST  14<L>  /  ALT  23  /  AlkPhos  56  09-20    Sodium: 142 mmol/L (09-20 @ 08:20)  Sodium: 145 mmol/L (09-19 @ 07:22)    Potassium: 4.4 mmol/L (09-20 @ 08:20)  Potassium: 4.5 mmol/L (09-19 @ 07:22)    Hemoglobin: 7.8 g/dL (09-20 @ 08:20)  Hemoglobin: 8.2 g/dL (09-19 @ 07:22)  Hemoglobin: 8.6 g/dL (09-18 @ 07:08)  Hemoglobin: 8.9 g/dL (09-17 @ 12:45)    Creatinine, Serum 4.30 (09-20 @ 08:20)  Creatinine, Serum 4.70 (09-19 @ 07:22)  Creatinine, Serum 4.40 (09-18 @ 07:08)  Creatinine, Serum 5.00 (09-17 @ 19:30)        LIVER FUNCTIONS - ( 20 Sep 2023 08:20 )  Alb: 3.0 g/dL / Pro: 6.3 g/dL / ALK PHOS: 56 U/L / ALT: 23 U/L / AST: 14 U/L / GGT: x           Urinalysis Basic - ( 20 Sep 2023 08:20 )    Color: x / Appearance: x / SG: x / pH: x  Gluc: 100 mg/dL / Ketone: x  / Bili: x / Urobili: x   Blood: x / Protein: x / Nitrite: x   Leuk Esterase: x / RBC: x / WBC x   Sq Epi: x / Non Sq Epi: x / Bacteria: x      
Patient is a 73y old  Male who presents with a chief complaint of anemia, gastric mass concerning for malignancy, wily (18 Sep 2023 16:24)  Patient seen in follow up for WILY.        PAST MEDICAL HISTORY:  HTN (hypertension)    BPH (benign prostatic hyperplasia)        MEDICATIONS  (STANDING):  allopurinol 100 milliGRAM(s) Oral daily  amLODIPine   Tablet 5 milliGRAM(s) Oral daily  cyanocobalamin Injectable 1000 MICROGram(s) IntraMuscular daily  folic acid 1 milliGRAM(s) Oral daily  heparin   Injectable 5000 Unit(s) SubCutaneous every 12 hours  lidocaine   4% Patch 1 Patch Transdermal every 24 hours  pantoprazole    Tablet 40 milliGRAM(s) Oral every 12 hours  polyethylene glycol 3350 17 Gram(s) Oral daily  senna 2 Tablet(s) Oral at bedtime  sodium chloride 0.45%. 1000 milliLiter(s) (75 mL/Hr) IV Continuous <Continuous>  tamsulosin 0.4 milliGRAM(s) Oral at bedtime    MEDICATIONS  (PRN):  acetaminophen     Tablet .. 650 milliGRAM(s) Oral every 6 hours PRN Mild Pain (1 - 3)    T(C): 36.8 (09-25-23 @ 11:41), Max: 37.1 (09-23-23 @ 13:33)  HR: 91 (09-25-23 @ 11:41) (70 - 91)  BP: 115/66 (09-25-23 @ 11:41) (115/66 - 151/74)  RR: 19 (09-25-23 @ 11:41)  SpO2: 95% (09-25-23 @ 11:41)  Wt(kg): --  I&O's Detail                  PHYSICAL EXAM:  General: No distress  Respiratory: b/l air entry  Cardiovascular: S1 S2  Gastrointestinal: soft  Extremities:  no edema                           LABORATORY:                        8.4    5.11  )-----------( 185      ( 25 Sep 2023 05:55 )             25.5     09-25    142  |  110<H>  |  30<H>  ----------------------------<  94  4.1   |  27  |  2.30<H>    Ca    10.3<H>      25 Sep 2023 05:55  Phos  3.8     09-25  Mg     1.6     09-25      Sodium: 142 mmol/L (09-25 @ 05:55)  Sodium: 141 mmol/L (09-24 @ 06:00)    Potassium: 4.1 mmol/L (09-25 @ 05:55)  Potassium: 3.8 mmol/L (09-24 @ 06:00)    Hemoglobin: 8.4 g/dL (09-25 @ 05:55)  Hemoglobin: 8.5 g/dL (09-24 @ 06:00)  Hemoglobin: 8.7 g/dL (09-23 @ 06:12)    Creatinine, Serum 2.30 (09-25 @ 05:55)  Creatinine, Serum 2.40 (09-24 @ 06:00)  Creatinine, Serum 2.60 (09-23 @ 06:12)          Urinalysis Basic - ( 25 Sep 2023 05:55 )    Color: x / Appearance: x / SG: x / pH: x  Gluc: 94 mg/dL / Ketone: x  / Bili: x / Urobili: x   Blood: x / Protein: x / Nitrite: x   Leuk Esterase: x / RBC: x / WBC x   Sq Epi: x / Non Sq Epi: x / Bacteria: x      
Patient is a 73y old  Male who presents with a chief complaint of anemia, gastric mass concerning for malignancy, wily (18 Sep 2023 16:24)  Patient seen in follow up for WILY.        PAST MEDICAL HISTORY:  HTN (hypertension)    BPH (benign prostatic hyperplasia)        MEDICATIONS  (STANDING):  allopurinol 100 milliGRAM(s) Oral daily  cyanocobalamin Injectable 1000 MICROGram(s) IntraMuscular daily  folic acid 1 milliGRAM(s) Oral daily  lidocaine   4% Patch 1 Patch Transdermal every 24 hours  pantoprazole    Tablet 40 milliGRAM(s) Oral every 12 hours  polyethylene glycol 3350 17 Gram(s) Oral daily  sodium chloride 0.45%. 1000 milliLiter(s) (75 mL/Hr) IV Continuous <Continuous>  tamsulosin 0.4 milliGRAM(s) Oral at bedtime    MEDICATIONS  (PRN):  acetaminophen     Tablet .. 650 milliGRAM(s) Oral every 6 hours PRN Mild Pain (1 - 3)    T(C): 36.8 (09-21-23 @ 14:48), Max: 37.1 (09-19-23 @ 20:45)  HR: 82 (09-21-23 @ 14:48) (76 - 96)  BP: 128/65 (09-21-23 @ 14:48) (107/59 - 150/76)  RR: 17 (09-21-23 @ 14:48)  SpO2: 96% (09-21-23 @ 14:48)  Wt(kg): --  I&O's Detail    20 Sep 2023 07:01  -  21 Sep 2023 07:00  --------------------------------------------------------  IN:    Oral Fluid: 120 mL    sodium chloride 0.45%: 900 mL  Total IN: 1020 mL    OUT:    Voided (mL): 1500 mL  Total OUT: 1500 mL    Total NET: -480 mL                PHYSICAL EXAM:  General: No distress  Respiratory: b/l air entry  Cardiovascular: S1 S2  Gastrointestinal: soft  Extremities:  no edema                         LABORATORY:                        7.6    4.52  )-----------( 157      ( 21 Sep 2023 05:30 )             22.7     09-21    142  |  110<H>  |  42<H>  ----------------------------<  97  4.4   |  27  |  3.80<H>    Ca    9.8      21 Sep 2023 05:30  Phos  4.0     09-21  Mg     1.3     09-21    TPro  5.9<L>  /  Alb  x   /  TBili  x   /  DBili  x   /  AST  x   /  ALT  x   /  AlkPhos  x   09-20    Sodium: 142 mmol/L (09-21 @ 05:30)  Sodium: 142 mmol/L (09-20 @ 08:20)    Potassium: 4.4 mmol/L (09-21 @ 05:30)  Potassium: 4.4 mmol/L (09-20 @ 08:20)  s  Hemoglobin: 7.6 g/dL (09-21 @ 05:30)  Hemoglobin: 7.8 g/dL (09-20 @ 08:20)  Hemoglobin: 8.2 g/dL (09-19 @ 07:22)    Creatinine, Serum 3.80 (09-21 @ 05:30)  Creatinine, Serum 4.30 (09-20 @ 08:20)  Creatinine, Serum 4.70 (09-19 @ 07:22)        LIVER FUNCTIONS - ( 20 Sep 2023 16:25 )  Alb: x     / Pro: 5.9 g/dL / ALK PHOS: x     / ALT: x     / AST: x     / GGT: x           Urinalysis Basic - ( 21 Sep 2023 05:30 )    Color: x / Appearance: x / SG: x / pH: x  Gluc: 97 mg/dL / Ketone: x  / Bili: x / Urobili: x   Blood: x / Protein: x / Nitrite: x   Leuk Esterase: x / RBC: x / WBC x   Sq Epi: x / Non Sq Epi: x / Bacteria: x      
[facility] -- Peter Piña Pannella, Patel, Savella, Goodger, Cohen  Office # [phone]    Follow Up:  HTN    Subjective/Observations: seen and examined, awake, alert, laying flat in bed, denies chest pain, dyspnea, palpitations or dizziness, orthopnea and PND. Tolerating room air. IVF infusing     REVIEW OF SYSTEMS: All other review of systems is negative unless indicated above  PAST MEDICAL & SURGICAL HISTORY:  HTN (hypertension)      BPH (benign prostatic hyperplasia)      History of appendectomy        MEDICATIONS  (STANDING):  allopurinol 100 milliGRAM(s) Oral daily  amLODIPine   Tablet 5 milliGRAM(s) Oral daily  cyanocobalamin Injectable 1000 MICROGram(s) IntraMuscular daily  folic acid 1 milliGRAM(s) Oral daily  heparin   Injectable 5000 Unit(s) SubCutaneous every 12 hours  lidocaine   4% Patch 1 Patch Transdermal every 24 hours  pantoprazole    Tablet 40 milliGRAM(s) Oral every 12 hours  polyethylene glycol 3350 17 Gram(s) Oral daily  senna 2 Tablet(s) Oral at bedtime  sodium chloride 0.45%. 1000 milliLiter(s) (75 mL/Hr) IV Continuous <Continuous>  tamsulosin 0.4 milliGRAM(s) Oral at bedtime    MEDICATIONS  (PRN):  acetaminophen     Tablet .. 650 milliGRAM(s) Oral every 6 hours PRN Mild Pain (1 - 3)    Allergies    No Known Allergies    Intolerances      Vital Signs Last 24 Hrs  T(C): 36.6 (26 Sep 2023 04:53), Max: 36.8 (25 Sep 2023 11:41)  T(F): 97.9 (26 Sep 2023 04:53), Max: 98.2 (25 Sep 2023 11:41)  HR: 73 (26 Sep 2023 04:53) (73 - 91)  BP: 144/69 (26 Sep 2023 04:53) (115/66 - 144/69)  BP(mean): --  RR: 17 (26 Sep 2023 04:53) (17 - 19)  SpO2: 96% (26 Sep 2023 04:53) (95% - 96%)    Parameters below as of 26 Sep 2023 04:53  Patient On (Oxygen Delivery Method): room air      I&O's Summary    25 Sep 2023 07:01  -  26 Sep 2023 07:00  --------------------------------------------------------  IN: 0 mL / OUT: 900 mL / NET: -900 mL        TELE: Not on telemetry   PHYSICAL EXAM:  Constitutional: NAD, awake and alert  HEENT: Moist Mucous Membranes, Anicteric  Pulmonary: Non-labored, breath sounds are clear bilaterally, No wheezing, rales or rhonchi  Cardiovascular: Regular, S1 and S2, No murmurs, rubs, gallops or clicks  Gastrointestinal: Bowel Sounds present, soft, nontender.   Lymph: No peripheral edema. No lymphadenopathy.  Skin: No visible rashes or ulcers.  Psych:  Mood & affect appropriate  LABS: All Labs Reviewed:                        8.4    5.11  )-----------( 185      ( 25 Sep 2023 05:55 )             25.5                         8.5    4.70  )-----------( 183      ( 24 Sep 2023 06:00 )             26.0     25 Sep 2023 05:55    142    |  110    |  30     ----------------------------<  94     4.1     |  27     |  2.30   24 Sep 2023 06:00    141    |  108    |  36     ----------------------------<  93     3.8     |  27     |  2.40     Ca    10.3       25 Sep 2023 05:55  Ca    9.9        24 Sep 2023 06:00  Phos  3.8       25 Sep 2023 05:55  Mg     1.6       25 Sep 2023 05:55            12 Lead ECG:   Ventricular Rate 76 BPM    Atrial Rate 76 BPM    P-R Interval 146 ms    QRS Duration 90 ms    Q-T Interval 366 ms    QTC Calculation(Bazett) 411 ms    P Axis 47 degrees    R Axis -19 degrees    T Axis 34 degrees    Diagnosis Line Normal sinus rhythm  Normal ECG  When compared with ECG of 07-JAN-2022 10:24,  No significant change was found  Confirmed by Chantal Metz ([id number]) on 9/17/2023 2:37:37 PM (09-17-23 @ 14:05)       
[facility] -- Peter Piña Pannella, Patel, Savella, Goodger, Cohen  Office # [phone]    Follow Up:   Cardiac clearance, HTN     Subjective/Observations: seen and examined, awake, alert, resting comfortably in bed, denies chest pain, dyspnea, palpitations or dizziness, orthopnea and PND. Tolerating room air. IVF  infusing , c/o right back pain     REVIEW OF SYSTEMS: All other review of systems is negative unless indicated above  PAST MEDICAL & SURGICAL HISTORY:  HTN (hypertension)      BPH (benign prostatic hyperplasia)      History of appendectomy        MEDICATIONS  (STANDING):  allopurinol 100 milliGRAM(s) Oral daily  cyanocobalamin Injectable 1000 MICROGram(s) IntraMuscular daily  folic acid 1 milliGRAM(s) Oral daily  lidocaine   4% Patch 1 Patch Transdermal every 24 hours  pantoprazole    Tablet 40 milliGRAM(s) Oral every 12 hours  polyethylene glycol 3350 17 Gram(s) Oral daily  sodium chloride 0.45%. 1000 milliLiter(s) (75 mL/Hr) IV Continuous <Continuous>  tamsulosin 0.4 milliGRAM(s) Oral at bedtime    MEDICATIONS  (PRN):  acetaminophen     Tablet .. 650 milliGRAM(s) Oral every 6 hours PRN Mild Pain (1 - 3)    Allergies    No Known Allergies    Intolerances      Vital Signs Last 24 Hrs  T(C): 36.5 (22 Sep 2023 04:44), Max: 36.8 (21 Sep 2023 14:48)  T(F): 97.7 (22 Sep 2023 04:44), Max: 98.3 (21 Sep 2023 14:48)  HR: 68 (22 Sep 2023 04:44) (68 - 95)  BP: 131/69 (22 Sep 2023 04:44) (107/59 - 131/69)  BP(mean): 91 (21 Sep 2023 11:15) (79 - 91)  RR: 16 (22 Sep 2023 04:44) (15 - 17)  SpO2: 96% (22 Sep 2023 04:44) (94% - 99%)    Parameters below as of 22 Sep 2023 04:44  Patient On (Oxygen Delivery Method): room air      I&O's Summary    21 Sep 2023 07:01  -  22 Sep 2023 07:00  --------------------------------------------------------  IN: 685 mL / OUT: 700 mL / NET: -15 mL       TELE: Not on telemetry   PHYSICAL EXAM:  Constitutional: NAD, awake and alert  HEENT: Moist Mucous Membranes, Anicteric  Pulmonary: Non-labored, breath sounds are clear bilaterally, No wheezing, rales or rhonchi  Cardiovascular: Regular, S1 and S2, No murmurs, rubs, gallops or clicks  Gastrointestinal: Bowel Sounds present, soft, nontender.   Lymph: No peripheral edema. No lymphadenopathy.  Skin: No visible rashes or ulcers.  Psych:  Mood & affect appropriate  LABS: All Labs Reviewed:             8.3    4.74  )-----------( 155      ( 22 Sep 2023 06:00 )             23.8                         7.6    4.52  )-----------( 157      ( 21 Sep 2023 05:30 )             22.7                         7.8    4.72  )-----------( 176      ( 20 Sep 2023 08:20 )             23.6     22 Sep 2023 06:00    142    |  110    |  40     ----------------------------<  93     3.8     |  25     |  3.20   21 Sep 2023 05:30    142    |  110    |  42     ----------------------------<  97     4.4     |  27     |  3.80   20 Sep 2023 08:20    142    |  111    |  46     ----------------------------<  100    4.4     |  25     |  4.30     Ca    9.5        22 Sep 2023 06:00  Ca    9.8        21 Sep 2023 05:30  Ca    9.9        20 Sep 2023 08:20  Phos  4.0       21 Sep 2023 05:30  Mg     2.0       22 Sep 2023 06:00  Mg     1.3       21 Sep 2023 05:30    TPro  5.9    /  Alb  x      /  TBili  x      /  DBili  x      /  AST  x      /  ALT  x      /  AlkPhos  x      20 Sep 2023 16:25  TPro  6.3    /  Alb  3.0    /  TBili  0.3    /  DBili  x      /  AST  14     /  ALT  23     /  AlkPhos  56     20 Sep 2023 08:20          12 Lead ECG:   Ventricular Rate 76 BPM    Atrial Rate 76 BPM    P-R Interval 146 ms    QRS Duration 90 ms    Q-T Interval 366 ms    QTC Calculation(Bazett) 411 ms    P Axis 47 degrees    R Axis -19 degrees    T Axis 34 degrees    Diagnosis Line Normal sinus rhythm  Normal ECG  When compared with ECG of 07-JAN-2022 10:24,  No significant change was found  Confirmed by Chantal Metz ([id number]) on 9/17/2023 2:37:37 PM (09-17-23 @ 14:05)       
[facility] -- Peter Piña, Hieu Mckeon, Ihsan, Josias Metz: Office # [phone]    Follow Up:  Cardiac clearance, HTN     Subjective/Observations: Patient seen and examined. Patient awake, alert, resting in bed. No complaints of chest pain, dyspnea, palpitations or dizziness. No signs of orthopnea or PND. Tolerating room air. Reports he does have poor appetite.     REVIEW OF SYSTEMS: All other review of systems are negative unless indicated above    PAST MEDICAL & SURGICAL HISTORY:  HTN (hypertension)      BPH (benign prostatic hyperplasia)      History of appendectomy    MEDICATIONS  (STANDING):  allopurinol 100 milliGRAM(s) Oral daily  cyanocobalamin 1000 MICROGram(s) Oral once  heparin   Injectable 5000 Unit(s) SubCutaneous every 12 hours  lidocaine   4% Patch 1 Patch Transdermal every 24 hours  pantoprazole    Tablet 40 milliGRAM(s) Oral every 12 hours  polyethylene glycol 3350 17 Gram(s) Oral daily  sodium chloride 0.45%. 1000 milliLiter(s) (75 mL/Hr) IV Continuous <Continuous>  tamsulosin 0.4 milliGRAM(s) Oral at bedtime    MEDICATIONS  (PRN):  acetaminophen     Tablet .. 650 milliGRAM(s) Oral every 6 hours PRN Mild Pain (1 - 3)    Allergies  No Known Allergies    Vital Signs Last 24 Hrs  T(C): 36.4 (19 Sep 2023 05:01), Max: 36.8 (18 Sep 2023 12:31)  T(F): 97.5 (19 Sep 2023 05:01), Max: 98.3 (18 Sep 2023 12:31)  HR: 78 (19 Sep 2023 05:01) (78 - 107)  BP: 111/60 (19 Sep 2023 05:01) (108/57 - 114/69)  BP(mean): --  RR: 18 (19 Sep 2023 05:01) (15 - 18)  SpO2: 95% (19 Sep 2023 05:01) (95% - 100%)    Parameters below as of 19 Sep 2023 05:01  Patient On (Oxygen Delivery Method): room air      I&O's Summary        TELE: Not on telemetry   PHYSICAL EXAM:  Constitutional: NAD, awake and alert  HEENT: Moist Mucous Membranes, Anicteric  Pulmonary: Non-labored, breath sounds are clear bilaterally, No wheezing, rales or rhonchi  Cardiovascular: Regular, S1 and S2, No murmurs, No rubs, gallops or clicks  Gastrointestinal:  soft, nontender, nondistended   Lymph: No peripheral edema. No lymphadenopathy.   Skin: No visible rashes or ulcers.  Psych:  Mood & affect appropriate      LABS: All Labs Reviewed:                        8.2    4.64  )-----------( 171      ( 19 Sep 2023 07:22 )             25.0                         8.6    4.78  )-----------( 170      ( 18 Sep 2023 07:08 )             26.3                         8.9    5.71  )-----------( 190      ( 17 Sep 2023 12:45 )             26.8     19 Sep 2023 07:22    145    |  112    |  51     ----------------------------<  98     4.5     |  26     |  4.70   18 Sep 2023 07:08    144    |  110    |  49     ----------------------------<  82     3.9     |  26     |  4.40   17 Sep 2023 19:30    142    |  110    |  59     ----------------------------<  108    3.9     |  25     |  5.00     Ca    9.6        19 Sep 2023 07:22  Ca    9.4        18 Sep 2023 07:08  Ca    9.2        17 Sep 2023 19:30  Phos  4.7       19 Sep 2023 07:22  Phos  4.9       18 Sep 2023 07:08  Mg     1.7       19 Sep 2023 07:22  Mg     1.8       18 Sep 2023 07:08    TPro  6.1    /  Alb  3.1    /  TBili  0.3    /  DBili  x      /  AST  16     /  ALT  25     /  AlkPhos  53     19 Sep 2023 07:22  TPro  6.6    /  Alb  3.3    /  TBili  0.4    /  DBili  x      /  AST  15     /  ALT  29     /  AlkPhos  58     18 Sep 2023 07:08  TPro  6.0    /  Alb  x      /  TBili  x      /  DBili  x      /  AST  x      /  ALT  x      /  AlkPhos  x      17 Sep 2023 19:30   LIVER FUNCTIONS - ( 19 Sep 2023 07:22 )  Alb: 3.1 g/dL / Pro: 6.1 g/dL / ALK PHOS: 53 U/L / ALT: 25 U/L / AST: 16 U/L / GGT: x           PT/INR - ( 17 Sep 2023 12:45 )   PT: 11.1 sec;   INR: 0.95 ratio         PTT - ( 17 Sep 2023 12:45 )  PTT:31.5 sec  12 Lead ECG:   Ventricular Rate 76 BPM    Atrial Rate 76 BPM    P-R Interval 146 ms    QRS Duration 90 ms    Q-T Interval 366 ms    QTC Calculation(Bazett) 411 ms    P Axis 47 degrees    R Axis -19 degrees    T Axis 34 degrees    Diagnosis Line Normal sinus rhythm  Normal ECG  When compared with ECG of 07-JAN-2022 10:24,  No significant change was found  Confirmed by Chantal Metz ([id number]) on 9/17/2023 2:37:37 PM (09-17-23 @ 14:05)    
[facility] -- Peter Piña, Hieu Mckeon, Ihsan, Josias Metz: Office # [phone]    Follow Up:  Cardiac clearance, HTN     Subjective/Observations: Patient seen and examined. Patient awake, alert, resting in bed. No complaints of chest pain, dyspnea, palpitations or dizziness. No signs of orthopnea or PND. Tolerating room air. IVF @ 75    REVIEW OF SYSTEMS: All other review of systems are negative unless indicated above    PAST MEDICAL & SURGICAL HISTORY:  HTN (hypertension)      BPH (benign prostatic hyperplasia)      History of appendectomy    MEDICATIONS  (STANDING):  allopurinol 100 milliGRAM(s) Oral daily  heparin   Injectable 5000 Unit(s) SubCutaneous every 12 hours  lidocaine   4% Patch 1 Patch Transdermal every 24 hours  pantoprazole    Tablet 40 milliGRAM(s) Oral every 12 hours  polyethylene glycol 3350 17 Gram(s) Oral daily  sodium chloride 0.45%. 1000 milliLiter(s) (75 mL/Hr) IV Continuous <Continuous>  tamsulosin 0.4 milliGRAM(s) Oral at bedtime    MEDICATIONS  (PRN):  acetaminophen     Tablet .. 650 milliGRAM(s) Oral every 6 hours PRN Mild Pain (1 - 3)    Allergies  No Known Allergies      Vital Signs Last 24 Hrs  T(C): 37 (20 Sep 2023 04:34), Max: 37.1 (19 Sep 2023 20:45)  T(F): 98.6 (20 Sep 2023 04:34), Max: 98.7 (19 Sep 2023 20:45)  HR: 76 (20 Sep 2023 04:34) (76 - 85)  BP: 128/64 (20 Sep 2023 04:34) (118/59 - 128/64)  BP(mean): --  RR: 18 (20 Sep 2023 04:34) (18 - 18)  SpO2: 96% (20 Sep 2023 04:34) (96% - 97%)    Parameters below as of 20 Sep 2023 04:34  Patient On (Oxygen Delivery Method): room air      I&O's Summary    20 Sep 2023 07:01  -  20 Sep 2023 11:19  --------------------------------------------------------  IN: 0 mL / OUT: 600 mL / NET: -600 mL        TELE: Not on telemetry   PHYSICAL EXAM:  Constitutional: NAD, awake and alert  HEENT: Moist Mucous Membranes, Anicteric  Pulmonary: Non-labored, breath sounds are clear bilaterally, No wheezing, rales or rhonchi  Cardiovascular: Regular, S1 and S2, No murmurs, No rubs, gallops or clicks  Gastrointestinal:  soft, nontender, nondistended   Lymph: No peripheral edema. No lymphadenopathy.   Skin: No visible rashes or ulcers.  Psych:  Mood & affect appropriate    LABS: All Labs Reviewed:                        7.8    4.72  )-----------( 176      ( 20 Sep 2023 08:20 )             23.6                         8.2    4.64  )-----------( 171      ( 19 Sep 2023 07:22 )             25.0                         8.6    4.78  )-----------( 170      ( 18 Sep 2023 07:08 )             26.3     20 Sep 2023 08:20    142    |  111    |  46     ----------------------------<  100    4.4     |  25     |  4.30   19 Sep 2023 07:22    145    |  112    |  51     ----------------------------<  98     4.5     |  26     |  4.70   18 Sep 2023 07:08    144    |  110    |  49     ----------------------------<  82     3.9     |  26     |  4.40     Ca    9.9        20 Sep 2023 08:20  Ca    9.6        19 Sep 2023 07:22  Ca    9.4        18 Sep 2023 07:08  Phos  4.7       19 Sep 2023 07:22  Phos  4.9       18 Sep 2023 07:08  Mg     1.7       19 Sep 2023 07:22  Mg     1.8       18 Sep 2023 07:08    TPro  6.3    /  Alb  3.0    /  TBili  0.3    /  DBili  x      /  AST  14     /  ALT  23     /  AlkPhos  56     20 Sep 2023 08:20  TPro  6.1    /  Alb  3.1    /  TBili  0.3    /  DBili  x      /  AST  16     /  ALT  25     /  AlkPhos  53     19 Sep 2023 07:22  TPro  6.6    /  Alb  3.3    /  TBili  0.4    /  DBili  x      /  AST  15     /  ALT  29     /  AlkPhos  58     18 Sep 2023 07:08   LIVER FUNCTIONS - ( 20 Sep 2023 08:20 )  Alb: 3.0 g/dL / Pro: 6.3 g/dL / ALK PHOS: 56 U/L / ALT: 23 U/L / AST: 14 U/L / GGT: x             12 Lead ECG:   Ventricular Rate 76 BPM    Atrial Rate 76 BPM    P-R Interval 146 ms    QRS Duration 90 ms    Q-T Interval 366 ms    QTC Calculation(Bazett) 411 ms    P Axis 47 degrees    R Axis -19 degrees    T Axis 34 degrees    Diagnosis Line Normal sinus rhythm  Normal ECG  When compared with ECG of 07-JAN-2022 10:24,  No significant change was found  Confirmed by Chantal Metz ([id number]) on 9/17/2023 2:37:37 PM (09-17-23 @ 14:05)    
[INTERVAL HX: ]  Patient seen and examined;  Chart reviewed and events noted;     [MEDICATIONS]  MEDICATIONS  (STANDING):  allopurinol 100 milliGRAM(s) Oral daily  amLODIPine   Tablet 5 milliGRAM(s) Oral daily  cyanocobalamin Injectable 1000 MICROGram(s) IntraMuscular daily  folic acid 1 milliGRAM(s) Oral daily  heparin   Injectable 5000 Unit(s) SubCutaneous every 12 hours  lidocaine   4% Patch 1 Patch Transdermal every 24 hours  pantoprazole    Tablet 40 milliGRAM(s) Oral every 12 hours  polyethylene glycol 3350 17 Gram(s) Oral daily  senna 2 Tablet(s) Oral at bedtime  sodium chloride 0.45%. 1000 milliLiter(s) (75 mL/Hr) IV Continuous <Continuous>  tamsulosin 0.4 milliGRAM(s) Oral at bedtime    MEDICATIONS  (PRN):  acetaminophen     Tablet .. 650 milliGRAM(s) Oral every 6 hours PRN Mild Pain (1 - 3)      [VITALS]  Vital Signs Last 24 Hrs  T(C): 36.8 (25 Sep 2023 11:41), Max: 36.8 (24 Sep 2023 20:48)  T(F): 98.2 (25 Sep 2023 11:41), Max: 98.3 (24 Sep 2023 20:48)  HR: 91 (25 Sep 2023 11:41) (73 - 91)  BP: 115/66 (25 Sep 2023 11:41) (115/66 - 129/69)  BP(mean): --  RR: 19 (25 Sep 2023 11:41) (18 - 19)  SpO2: 95% (25 Sep 2023 11:41) (93% - 95%)    Parameters below as of 25 Sep 2023 11:41  Patient On (Oxygen Delivery Method): room air      [WT/HT]  Daily     Daily   [VENT]      [PHYSICAL EXAM]  GEN: NAD  HEENT: normocephalic and atraumatic. EOMI. PERRL.    NECK: Supple.  No lymphadenopathy   LUNGS: Clear to auscultation.  HEART: Regular rate and rhythm,  no MRG  ABDOMEN: Soft, nontender, and nondistended.  Positive bowel sounds.    : No CVA tenderness  EXTREMITIES: Without edema.  NEUROLOGIC: grossly intact.  PSYCHIATRIC: Appropriate affect .  SKIN: No rash     [LABS:]                        8.4    5.11  )-----------( 185      ( 25 Sep 2023 05:55 )             25.5     09-25    142  |  110<H>  |  30<H>  ----------------------------<  94  4.1   |  27  |  2.30<H>    Ca    10.3<H>      25 Sep 2023 05:55  Phos  3.8     09-25  Mg     1.6     09-25            Serum Protein Electrophoresis Interp: See Note (09-20-23 @ 16:25)    Sedimentation Rate, Erythrocyte: 63 mm/hr *H* [0 - 20] (09-20-23 @ 12:45)    Folate, Serum: 5.7 ng/mL (09-18-23 @ 07:08)    Vitamin B12, Serum: 315 pg/mL [232 - 1245] (09-18-23 @ 07:08)    Ferritin: 686 ng/mL *H* [30 - 400] (09-18-23 @ 07:08)    Iron - Total Binding Capacity.: 223 ug/dL [220 - 430] (09-18-23 @ 07:08)    Reticulocyte Count (09-18-23 @ 07:08)  Reticulocyte Percent: 1.0 % [0.5 - 2.5]  Absolute Reticulocytes: 28.0 K/uL [25.0 - 125.0]    Serum Protein Electrophoresis Interp: See Note (09-18-23 @ 07:08)    Protein Electrophoresis, Urine (09-17-23 @ 22:00)  Immunofixation, Urine: Two Bence Ruelas protein Kappa type  Urine Electrophoresis Interpretation: Two Gamma-Migrating Paraproteins Identified      Urinalysis Basic - ( 25 Sep 2023 05:55 )    Color: x / Appearance: x / SG: x / pH: x  Gluc: 94 mg/dL / Ketone: x  / Bili: x / Urobili: x   Blood: x / Protein: x / Nitrite: x   Leuk Esterase: x / RBC: x / WBC x   Sq Epi: x / Non Sq Epi: x / Bacteria: x        COVID-19 PCR: NotDetec (17 Sep 2023 12:45)          [RADIOLOGY STUDIES:]    
CHIEF COMPLAINT/INTERVAL HISTORY:  Pt. seen and evaluated for gastric tumor and acute kidney injury.  Pt. is in no distress. Reports that his chest feels cold.  Also reported having constipation.      REVIEW OF SYSTEMS:  No fever, CP, SOB, or abdominal pain.      Vital Signs Last 24 Hrs  T(C): 36.8 (24 Sep 2023 10:07), Max: 37.1 (23 Sep 2023 13:33)  T(F): 98.3 (24 Sep 2023 10:07), Max: 98.7 (23 Sep 2023 13:33)  HR: 89 (24 Sep 2023 10:07) (70 - 89)  BP: 133/68 (24 Sep 2023 10:07) (128/64 - 151/74)  BP(mean): --  RR: 18 (24 Sep 2023 10:07) (16 - 18)  SpO2: 96% (24 Sep 2023 10:07) (94% - 96%)    Parameters below as of 24 Sep 2023 10:07  Patient On (Oxygen Delivery Method): room air        PHYSICAL EXAM:  GENERAL: NAD  HEENT: EOMI, hearing normal, conjunctiva and sclera clear  Chest: CTA bilaterally, no wheezing  CV: S1S2, RRR,   GI: soft, +BS, NT/ND  Musculoskeletal: no edema  Psychiatric: affect nL, mood nL  Skin: warm and dry    LABS:                        8.5    4.70  )-----------( 183      ( 24 Sep 2023 06:00 )             26.0     09-24    141  |  108  |  36<H>  ----------------------------<  93  3.8   |  27  |  2.40<H>    Ca    9.9      24 Sep 2023 06:00  Mg     1.7     09-23        Urinalysis Basic - ( 24 Sep 2023 06:00 )    Color: x / Appearance: x / SG: x / pH: x  Gluc: 93 mg/dL / Ketone: x  / Bili: x / Urobili: x   Blood: x / Protein: x / Nitrite: x   Leuk Esterase: x / RBC: x / WBC x   Sq Epi: x / Non Sq Epi: x / Bacteria: x        
Patient seen and examined  denies pain  npo for egd today  called office in [place] for collateral  (burak Barnes at [phone])  last labs in system january 2023  gfr 62 hb 12    Review of Systems:  General:denies fever chills, headache, weakness  HEENT: denies blurry vision,diffculty swallowing, difficulty hearing, tinnitus  Cardiovascular: denies chest pain  ,palpitations  Pulmonary:denies shortness of breath, cough, wheezing, hemoptysis  Gastrointestinal: denies abdominal pain, constipation, diarrhea,nausea , vomiting, hematochezia  : denies hematuria, dysuria, or incontinence  Neurological: denies weakness, numbness , tingling, dizziness, tremors  MSK: denies muscle pain, difficulty ambulating, swelling, back pain  skin: denies skin rash, itching, burning, or  skin lesions  Psychiatrical: denies mood disturbances, anxierty, feeling depressed, depression , or difficulty sleeping    Objective:  Vitals  T(C): 36.6 (09-18-23 @ 04:54), Max: 37.1 (09-17-23 @ 20:22)  HR: 78 (09-18-23 @ 04:54) (71 - 89)  BP: 132/73 (09-18-23 @ 04:54) (114/69 - 133/63)  RR: 18 (09-18-23 @ 04:54) (16 - 18)  SpO2: 98% (09-18-23 @ 04:54) (96% - 100%)    Physical Exam:  General: comfortable, no acute distress  HEENT: Atraumatic, no LAD, trachea midline, PERRLA  Cardiovascular: normal s1s2, no murmurs, gallops or fricition rubs  Pulmonary: clear to ausculation Bilaterally, no wheezing , rhonchi  Gastrointestinal: soft non tender non distended, no masses felt, no organomegally  Muscloskeletal: no lower extremity edema, intact bilateral lower extremity pulses  Neurological: CN II-12 intact. No focal weakness  Psychiatrical: normal mood, cooperative  SKIN: no rash, lesions or ulcers    Labs:                          8.6    4.78  )-----------( 170      ( 18 Sep 2023 07:08 )             26.3     09-18    144  |  110<H>  |  49<H>  ----------------------------<  82  3.9   |  26  |  4.40<H>    Ca    9.4      18 Sep 2023 07:08  Phos  4.9     09-18  Mg     1.8     09-18    TPro  6.6  /  Alb  3.3  /  TBili  0.4  /  DBili  x   /  AST  15  /  ALT  29  /  AlkPhos  58  09-18    LIVER FUNCTIONS - ( 18 Sep 2023 07:08 )  Alb: 3.3 g/dL / Pro: 6.6 g/dL / ALK PHOS: 58 U/L / ALT: 29 U/L / AST: 15 U/L / GGT: x           PT/INR - ( 17 Sep 2023 12:45 )   PT: 11.1 sec;   INR: 0.95 ratio         PTT - ( 17 Sep 2023 12:45 )  PTT:31.5 sec      Active Medications  MEDICATIONS  (STANDING):  amLODIPine   Tablet 10 milliGRAM(s) Oral daily  heparin   Injectable 5000 Unit(s) SubCutaneous every 12 hours  lidocaine   4% Patch 1 Patch Transdermal every 24 hours  pantoprazole    Tablet 40 milliGRAM(s) Oral before breakfast  polyethylene glycol 3350 17 Gram(s) Oral daily  sodium chloride 0.45%. 1000 milliLiter(s) (75 mL/Hr) IV Continuous <Continuous>  tamsulosin 0.4 milliGRAM(s) Oral at bedtime    MEDICATIONS  (PRN):  acetaminophen     Tablet .. 650 milliGRAM(s) Oral every 6 hours PRN Mild Pain (1 - 3)    
Patient seen and examined  comfortable  on diet  denies complaint  hb further declined: 8.2  Cr upticked to 4.7  Review of Systems:  General:denies fever chills, headache, weakness  HEENT: denies blurry vision,diffculty swallowing, difficulty hearing, tinnitus  Cardiovascular: denies chest pain  ,palpitations  Pulmonary:denies shortness of breath, cough, wheezing, hemoptysis  Gastrointestinal: denies abdominal pain, constipation, diarrhea,nausea , vomiting, hematochezia  : denies hematuria, dysuria, or incontinence  Neurological: denies weakness, numbness , tingling, dizziness, tremors  MSK: denies muscle pain, difficulty ambulating, swelling, back pain  skin: denies skin rash, itching, burning, or  skin lesions  Psychiatrical: denies mood disturbances, anxierty, feeling depressed, depression , or difficulty sleeping    Objective:  Vitals  T(C): 36.7 (09-19-23 @ 12:28), Max: 36.7 (09-19-23 @ 12:28)  HR: 79 (09-19-23 @ 12:28) (78 - 89)  BP: 118/59 (09-19-23 @ 12:28) (108/57 - 118/59)  RR: 18 (09-19-23 @ 12:28) (18 - 18)  SpO2: 96% (09-19-23 @ 12:28) (95% - 98%)    Physical Exam:  General: comfortable, no acute distress  HEENT: Atraumatic, no LAD, trachea midline, PERRLA  Cardiovascular: normal s1s2, no murmurs, gallops or fricition rubs  Pulmonary: clear to ausculation Bilaterally, no wheezing , rhonchi  Gastrointestinal: soft non tender non distended, no masses felt, no organomegally  Muscloskeletal: no lower extremity edema, intact bilateral lower extremity pulses  Neurological: CN II-12 intact. No focal weakness  Psychiatrical: normal mood, cooperative  SKIN: no rash, lesions or ulcers    Labs:                          8.2    4.64  )-----------( 171      ( 19 Sep 2023 07:22 )             25.0     09-19    145  |  112<H>  |  51<H>  ----------------------------<  98  4.5   |  26  |  4.70<H>    Ca    9.6      19 Sep 2023 07:22  Phos  4.7     09-19  Mg     1.7     09-19    TPro  6.1  /  Alb  3.1<L>  /  TBili  0.3  /  DBili  x   /  AST  16  /  ALT  25  /  AlkPhos  53  09-19    LIVER FUNCTIONS - ( 19 Sep 2023 07:22 )  Alb: 3.1 g/dL / Pro: 6.1 g/dL / ALK PHOS: 53 U/L / ALT: 25 U/L / AST: 16 U/L / GGT: x                 Active Medications  MEDICATIONS  (STANDING):  allopurinol 100 milliGRAM(s) Oral daily  heparin   Injectable 5000 Unit(s) SubCutaneous every 12 hours  lidocaine   4% Patch 1 Patch Transdermal every 24 hours  pantoprazole    Tablet 40 milliGRAM(s) Oral every 12 hours  polyethylene glycol 3350 17 Gram(s) Oral daily  sodium chloride 0.45%. 1000 milliLiter(s) (75 mL/Hr) IV Continuous <Continuous>  tamsulosin 0.4 milliGRAM(s) Oral at bedtime    MEDICATIONS  (PRN):  acetaminophen     Tablet .. 650 milliGRAM(s) Oral every 6 hours PRN Mild Pain (1 - 3)

## 2023-09-26 NOTE — PROGRESS NOTE ADULT - ASSESSMENT
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   PMH HTN and BPH, presents with fatigue, anorexia, and weight loss x 1 month, admitted for new gastric mass, anemia and WILY.   Workup showed urine bence jnes protein and elevated light chains. Concerning for MM  Concurrently with 4xm submucosal gastric lesion.     Gastric tumor. ? GIST  Sx with fatigue, weakness, weight loss, decreased appetite x1 month   weight loss/malnutrition   Exophytic lesion arising from the gastric fundus may represent a solid mass such as a GIST.   Differential includes gastric diverticulum, which is considered less likely.     s/p  upper gastrointestinal endoscopy:  large sub mucosal lesion noted in the gastric cardia  GE jn noted at 36cm from the incisors  Clean based duodenal ulcer  patient is to follow up with Dr. Breonna Lombardi interventional GI at [facility] for EUS/Bx    Duodenal ulcer  monitor for bleeding    Compression Fx  Age indeterminate compression fracture deformity involving the T8 vertebral body. Correlate for point tenderness.  ? osteoporosis vs from MM    MM vs Light chain disease  skeletal survey with calvarial lytic lesion  WILY / MM / Bence Ruelas protein - elevated light chains.    SIFE pending  Concern for MM vs light chain disease      Multifactorial anemia  ·	Anemia chronic disease  ·	Anemia CKD  ·	Anemia b12 def, Anemia folate def  ·	Ferritin adequate      RECOMMENDATIONS    Gastric tumor / ?GIST  -for outpt EGD with EUS planned.     Multifactorial anemia w light chain ds  -post bone marrow, will follow results  -no acute intervention needed at present Hgb level    stable from Heme/Onc standpoint for discharge  will follow up in office as outpatient w Dr Peacock  -

## 2023-09-26 NOTE — PROGRESS NOTE ADULT - ASSESSMENT
gastric mass/GIST  weight loss/malnutrition     s/p  upper gastrointestinal endoscopy:  large sub mucosal lesion noted in the gastric cardia  GE jn noted at 36cm from the incisors  clean based duodenal ulcer    rec:   proton pump inhibitor orally bid  patient is to follow up with Dr. Breonna Lombardi interventional GI at [facility] for EUS/Bx  prbc transfusions per heme/onc recs; input appreciated  s/p BM bx; heme following   encourage PO intake

## 2023-09-27 DIAGNOSIS — K31.9 DISEASE OF STOMACH AND DUODENUM, UNSPECIFIED: ICD-10-CM

## 2023-09-27 PROBLEM — N40.0 BENIGN PROSTATIC HYPERPLASIA WITHOUT LOWER URINARY TRACT SYMPTOMS: Chronic | Status: ACTIVE | Noted: 2023-09-17

## 2023-09-27 LAB — HUMAN ERYTHROCYTE ANTIGEN PANEL RESULT: SIGNIFICANT CHANGE UP

## 2023-09-28 PROBLEM — I10 ESSENTIAL (PRIMARY) HYPERTENSION: Chronic | Status: ACTIVE | Noted: 2023-09-17

## 2023-10-02 ENCOUNTER — OUTPATIENT (OUTPATIENT)
Dept: OUTPATIENT SERVICES | Facility: HOSPITAL | Age: 73
LOS: 1 days | End: 2023-10-02
Payer: MEDICARE

## 2023-10-02 VITALS
TEMPERATURE: 98 F | SYSTOLIC BLOOD PRESSURE: 137 MMHG | HEIGHT: 64.96 IN | DIASTOLIC BLOOD PRESSURE: 74 MMHG | OXYGEN SATURATION: 99 % | RESPIRATION RATE: 20 BRPM | HEART RATE: 102 BPM | WEIGHT: 147.93 LBS

## 2023-10-02 DIAGNOSIS — Z01.818 ENCOUNTER FOR OTHER PREPROCEDURAL EXAMINATION: ICD-10-CM

## 2023-10-02 DIAGNOSIS — K31.9 DISEASE OF STOMACH AND DUODENUM, UNSPECIFIED: ICD-10-CM

## 2023-10-02 DIAGNOSIS — Z90.49 ACQUIRED ABSENCE OF OTHER SPECIFIED PARTS OF DIGESTIVE TRACT: Chronic | ICD-10-CM

## 2023-10-02 LAB
ALBUMIN SERPL ELPH-MCNC: 4.2 G/DL — SIGNIFICANT CHANGE UP (ref 3.3–5)
ALP SERPL-CCNC: 67 U/L — SIGNIFICANT CHANGE UP (ref 40–120)
ALT FLD-CCNC: 24 U/L — SIGNIFICANT CHANGE UP (ref 10–45)
ANION GAP SERPL CALC-SCNC: 12 MMOL/L — SIGNIFICANT CHANGE UP (ref 5–17)
AST SERPL-CCNC: 15 U/L — SIGNIFICANT CHANGE UP (ref 10–40)
BILIRUB SERPL-MCNC: 0.3 MG/DL — SIGNIFICANT CHANGE UP (ref 0.2–1.2)
BUN SERPL-MCNC: 27 MG/DL — HIGH (ref 7–23)
CALCIUM SERPL-MCNC: 11.8 MG/DL — HIGH (ref 8.4–10.5)
CHLORIDE SERPL-SCNC: 106 MMOL/L — SIGNIFICANT CHANGE UP (ref 96–108)
CO2 SERPL-SCNC: 26 MMOL/L — SIGNIFICANT CHANGE UP (ref 22–31)
CREAT SERPL-MCNC: 2.46 MG/DL — HIGH (ref 0.5–1.3)
EGFR: 27 ML/MIN/1.73M2 — LOW
GLUCOSE SERPL-MCNC: 131 MG/DL — HIGH (ref 70–99)
HCT VFR BLD CALC: 26.9 % — LOW (ref 39–50)
HGB BLD-MCNC: 8.8 G/DL — LOW (ref 13–17)
MCHC RBC-ENTMCNC: 29.9 PG — SIGNIFICANT CHANGE UP (ref 27–34)
MCHC RBC-ENTMCNC: 32.7 GM/DL — SIGNIFICANT CHANGE UP (ref 32–36)
MCV RBC AUTO: 91.5 FL — SIGNIFICANT CHANGE UP (ref 80–100)
NRBC # BLD: 0 /100 WBCS — SIGNIFICANT CHANGE UP (ref 0–0)
PLATELET # BLD AUTO: 261 K/UL — SIGNIFICANT CHANGE UP (ref 150–400)
POTASSIUM SERPL-MCNC: 4.4 MMOL/L — SIGNIFICANT CHANGE UP (ref 3.5–5.3)
POTASSIUM SERPL-SCNC: 4.4 MMOL/L — SIGNIFICANT CHANGE UP (ref 3.5–5.3)
PROT SERPL-MCNC: 6.6 G/DL — SIGNIFICANT CHANGE UP (ref 6–8.3)
RBC # BLD: 2.94 M/UL — LOW (ref 4.2–5.8)
RBC # FLD: 14.6 % — HIGH (ref 10.3–14.5)
SODIUM SERPL-SCNC: 144 MMOL/L — SIGNIFICANT CHANGE UP (ref 135–145)
WBC # BLD: 6.32 K/UL — SIGNIFICANT CHANGE UP (ref 3.8–10.5)
WBC # FLD AUTO: 6.32 K/UL — SIGNIFICANT CHANGE UP (ref 3.8–10.5)

## 2023-10-02 PROCEDURE — G0463: CPT

## 2023-10-02 PROCEDURE — 85027 COMPLETE CBC AUTOMATED: CPT

## 2023-10-02 PROCEDURE — 80053 COMPREHEN METABOLIC PANEL: CPT

## 2023-10-02 RX ORDER — LISINOPRIL/HYDROCHLOROTHIAZIDE 10-12.5 MG
1 TABLET ORAL
Refills: 0 | DISCHARGE

## 2023-10-02 NOTE — H&P PST ADULT - ASSESSMENT
DASI score: 5  DASI activity: can do ADL's, no formal exercise  Loose teeth or denture:   full upper, lower partial

## 2023-10-02 NOTE — H&P PST ADULT - HISTORY OF PRESENT ILLNESS
74 yo male with PMhx of history of weakness and decreased appetite for last 2 mos.  Seen in ER in  and found to be anemic, [place] and admitted to [facility] for 1 week, Endoscopy done and found to have gastric mass, bone marrow biopsy done -daughter states result given today of Multiple Myeloma stage 3.  Found to be anemic and transfused at [place], also with WILY on this admission  scheduled to start chemo Monday 10/9/23    74 yo male with PMhx of history of weakness and decreased appetite for last 2 mos.  Seen in ER in  and found to be anemic, [place] and admitted to [facility] for 1 week, Endoscopy done and found to have gastric mass, bone marrow biopsy done -daughter states result given today of Multiple Myeloma stage 3.  Found to be anemic and transfused at [place], also with WILY on admission. Latest creatinine 2.4.   scheduled to start chemo Monday 10/9/23     presents for endoscopy ultrasound 10/10/23.  74 yo male with PMhx of history of weakness and decreased appetite for last 2 mos.  Seen in ER in  and found to be anemic, [place] and admitted to [facility] for 1 week, Endoscopy done and found to have gastric mass, bone marrow biopsy done -daughter states result given today of Multiple Myeloma stage 3.  Found to be anemic and transfused at [place], also with WILY on admission. Latest creatinine 2.4. Discharged from [place] 9/27/23.   scheduled to start chemo Monday 10/9/23     presents for endoscopy ultrasound 10/10/23.  74 yo [place] speaking male with PMhx of history of weakness and decreased appetite for last 2 mos.  Seen in ER in  and found to be anemic, [place] and admitted to [facility] for 1 week, Endoscopy done and found to have gastric mass, bone marrow biopsy done -daughter states result given today of Multiple Myeloma stage 3.  Found to be anemic and transfused at [place], also with WILY on admission. Latest creatinine 2.4. Discharged from [place] 9/27/23.   scheduled to start chemo Monday 10/9/23     presents for endoscopy ultrasound 10/10/23.   History via translation by daughter TO.

## 2023-10-02 NOTE — H&P PST ADULT - GEN GEN HX ROS MEA POS PC
Ear Cerumen Removal  Date/Time: 2/11/2020 2:15 PM  Performed by: WEN Steele  Authorized by: WEN Steele     Consent Done?:  Yes (Verbal)  Ceruminolytics applied: Ceruminolytics applied prior to the procedure (Colace)    Location details:  Both ears  Procedure type: irrigation    Cerumen  Removal Results:  Cerumen completely removed  Patient tolerance:  Patient tolerated the procedure well with no immediate complications      
anorexia/weight loss/malaise/fatigue/weakness

## 2023-10-02 NOTE — H&P PST ADULT - TOBACCO USE
Former smoker
I have personally seen and examined this patient. I have fully participated in the care of this patient. I have reviewed all pertinent clinical information, including history physical exam, plan and the PA's note and agree except as noted  pt 14y f c/o right great toe pain x 2 months. pt had an ingrown toenail that mom tried to pick out and has not improved. is here today for removal of ingrown toenail. isnt currently doing warm soaks or taking any pain medication. discussed at [facility] with podiatry  denies fever, chills, numbness, tingling, weakness  PE : Ext: right great toe. +swelling and callus medial aspect without fluctuance or erythema. no streaking or crepitus

## 2023-10-02 NOTE — H&P PST ADULT - NSICDXPASTMEDICALHX_GEN_ALL_CORE_FT
PAST MEDICAL HISTORY:  WILY (acute kidney injury)     Anemia of chronic disease     BPH (benign prostatic hyperplasia)     H/O: compression fracture of spine     HTN (hypertension)     Kidney stones     Multiple myeloma      PAST MEDICAL HISTORY:  WILY (acute kidney injury)     Anemia of chronic disease     BPH (benign prostatic hyperplasia)     Gastric mass     H/O: compression fracture of spine     HTN (hypertension)     Kidney stones     Multiple myeloma

## 2023-10-09 PROBLEM — C90.00 MULTIPLE MYELOMA NOT HAVING ACHIEVED REMISSION: Chronic | Status: ACTIVE | Noted: 2023-10-02

## 2023-10-09 PROBLEM — N17.9 ACUTE KIDNEY FAILURE, UNSPECIFIED: Chronic | Status: ACTIVE | Noted: 2023-10-02

## 2023-10-09 PROBLEM — N20.0 CALCULUS OF KIDNEY: Chronic | Status: ACTIVE | Noted: 2023-10-02

## 2023-10-09 PROBLEM — D63.8 ANEMIA IN OTHER CHRONIC DISEASES CLASSIFIED ELSEWHERE: Chronic | Status: ACTIVE | Noted: 2023-10-02

## 2023-10-09 PROBLEM — Z87.81 PERSONAL HISTORY OF (HEALED) TRAUMATIC FRACTURE: Chronic | Status: ACTIVE | Noted: 2023-10-02

## 2023-10-09 PROBLEM — K31.89 OTHER DISEASES OF STOMACH AND DUODENUM: Chronic | Status: ACTIVE | Noted: 2023-10-02

## 2023-10-10 ENCOUNTER — OUTPATIENT (OUTPATIENT)
Dept: OUTPATIENT SERVICES | Facility: HOSPITAL | Age: 73
LOS: 1 days | End: 2023-10-10
Payer: MEDICARE

## 2023-10-10 ENCOUNTER — RESULT REVIEW (OUTPATIENT)
Age: 73
End: 2023-10-10

## 2023-10-10 ENCOUNTER — APPOINTMENT (OUTPATIENT)
Dept: GASTROENTEROLOGY | Facility: HOSPITAL | Age: 73
End: 2023-10-10

## 2023-10-10 ENCOUNTER — TRANSCRIPTION ENCOUNTER (OUTPATIENT)
Age: 73
End: 2023-10-10

## 2023-10-10 VITALS
OXYGEN SATURATION: 96 % | DIASTOLIC BLOOD PRESSURE: 62 MMHG | HEART RATE: 80 BPM | RESPIRATION RATE: 17 BRPM | SYSTOLIC BLOOD PRESSURE: 120 MMHG

## 2023-10-10 VITALS
DIASTOLIC BLOOD PRESSURE: 73 MMHG | WEIGHT: 149.91 LBS | SYSTOLIC BLOOD PRESSURE: 135 MMHG | TEMPERATURE: 99 F | HEART RATE: 112 BPM | RESPIRATION RATE: 20 BRPM | HEIGHT: 66 IN | OXYGEN SATURATION: 98 %

## 2023-10-10 DIAGNOSIS — Z90.49 ACQUIRED ABSENCE OF OTHER SPECIFIED PARTS OF DIGESTIVE TRACT: Chronic | ICD-10-CM

## 2023-10-10 DIAGNOSIS — K31.9 DISEASE OF STOMACH AND DUODENUM, UNSPECIFIED: ICD-10-CM

## 2023-10-10 PROCEDURE — 88342 IMHCHEM/IMCYTCHM 1ST ANTB: CPT | Mod: 26

## 2023-10-10 PROCEDURE — 88305 TISSUE EXAM BY PATHOLOGIST: CPT | Mod: 26

## 2023-10-10 PROCEDURE — 88360 TUMOR IMMUNOHISTOCHEM/MANUAL: CPT | Mod: 26

## 2023-10-10 PROCEDURE — 43242 EGD US FINE NEEDLE BX/ASPIR: CPT

## 2023-10-10 PROCEDURE — 88305 TISSUE EXAM BY PATHOLOGIST: CPT

## 2023-10-10 PROCEDURE — 88342 IMHCHEM/IMCYTCHM 1ST ANTB: CPT

## 2023-10-10 PROCEDURE — 88360 TUMOR IMMUNOHISTOCHEM/MANUAL: CPT

## 2023-10-10 PROCEDURE — 88341 IMHCHEM/IMCYTCHM EA ADD ANTB: CPT

## 2023-10-10 PROCEDURE — 88341 IMHCHEM/IMCYTCHM EA ADD ANTB: CPT | Mod: 26,59

## 2023-10-10 NOTE — ASU PATIENT PROFILE, ADULT - FALL HARM RISK - FACTORS
Dr Aleyda Dinh put in the order; She would like it done STAT per her note in the order. Contacted central scheduling- there were no openings for tomorrow. She attempted to reach ECHO dept but they were gone for the day. Will call tomorrow to get ECHO done that day. Impaired gait/Poor balance/Weakness

## 2023-10-10 NOTE — ASU PATIENT PROFILE, ADULT - NSICDXPASTMEDICALHX_GEN_ALL_CORE_FT
PAST MEDICAL HISTORY:  WILY (acute kidney injury)     Anemia of chronic disease     BPH (benign prostatic hyperplasia)     Gastric mass     H/O: compression fracture of spine     HTN (hypertension)     Kidney stones     Multiple myeloma

## 2023-10-10 NOTE — ASU DISCHARGE PLAN (ADULT/PEDIATRIC) - NS MD DC FALL RISK RISK
For information on Fall & Injury Prevention, visit: [email or web address] OR  [email or web address] OR  https://www.cdc.gov/steadi/patient.html

## 2023-10-10 NOTE — PRE-ANESTHESIA EVALUATION ADULT - NSANTHPMHFT_GEN_ALL_CORE
72 yo [place] Island[place] speaking male with PMhx of history of weakness and decreased appetite for last 2 mos.  Seen in ER in  and found to be anemic, [place] and admitted to [facility] for 1 week, Endoscopy done and found to have gastric mass, bone marrow biopsy done -daughter states result given today of Multiple Myeloma stage 3.  Found to be anemic and transfused at [place], also with WILY on admission. Latest creatinine 2.4. Discharged from [place] 9/27/23.   scheduled to start chemo Monday 10/9/23     presents for endoscopy ultrasound 10/10/23.   History via translation by daughter TO.

## 2023-10-10 NOTE — ASU PATIENT PROFILE, ADULT - FALL HARM RISK - RISK INTERVENTIONS

## 2023-10-10 NOTE — PRE PROCEDURE NOTE - PRE PROCEDURE EVALUATION
Attending Physician: Harjit                           Procedure: EGD EUS    Indication for Procedure: Gastric mass  ________________________________________________________  PAST MEDICAL & SURGICAL HISTORY:  HTN (hypertension)      BPH (benign prostatic hyperplasia)      Kidney stones      H/O: compression fracture of spine      Anemia of chronic disease      Multiple myeloma      WILY (acute kidney injury)      Gastric mass      History of appendectomy        ALLERGIES:  No Known Allergies    HOME MEDICATIONS:  amLODIPine 10 mg oral tablet: 1 orally once a day  Darzalex 20 mg/mL intravenous solution: intravenous once a week    AICD/PPM: [ ] yes   [ ] no    PERTINENT LAB DATA:                      PHYSICAL EXAMINATION:    Height (cm): 167.6  Weight (kg): 68  BMI (kg/m2): 24.2  BSA (m2): 1.77T(C): 37.3  HR: 112  BP: 135/73  RR: 20  SpO2: 98%    Constitutional: NAD  HEENT: PERRLA, EOMI,    Neck:  No JVD  Respiratory: CTAB/L  Cardiovascular: S1 and S2  Gastrointestinal: BS+, soft, NT/ND  Extremities: No peripheral edema  Neurological: A/O x 3, no focal deficits  Psychiatric: Normal mood, normal affect  Skin: No rashes    ASA Class: I [ ]  II [ ]  III [ ]  IV [ ]    COMMENTS:    The patient is a suitable candidate for the planned procedure unless box checked [ ]  No, explain:

## 2023-10-10 NOTE — ASU PREOP CHECKLIST - IDENTIFICATION BAND VERIFIED
Fall Prevention  Falls often occur due to slipping, tripping or losing your balance. Millions of people fall every year and injure themselves. Here are ways to reduce your risk of falling again.  · Think about your fall, was there anything that caused your fall that can be fixed, removed, or replaced?  · Make your home safe by keeping walkways clear of objects you may trip over.  · Use non-slip pads under rugs. Do not use area rugs or small throw rugs.  · Use non-slip mats in bathtubs and showers.  · Install handrails and lights on staircases.  · Do not walk in poorly lit areas.  · Do not stand on chairs or wobbly ladders.  · Use caution when reaching overhead or looking upward. This position can cause a loss of balance.  · Be sure your shoes fit properly, have non-slip bottoms and are in good condition.   · Wear shoes both inside and out. Avoid going barefoot or wearing slippers.  · Be cautious when going up and down stairs, curbs, and when walking on uneven sidewalks.  · If your balance is poor, consider using a cane or walker.  · If your fall was related to alcohol use, stop or limit alcohol intake.   · If your fall was related to use of sleeping medicines, talk to your doctor about this. You may need to reduce your dosage at bedtime if you awaken during the night to go to the bathroom.    · To reduce the need for nighttime bathroom trips:  ? Avoid drinking fluids for several hours before going to bed  ? Empty your bladder before going to bed  ? Men can keep a urinal at the bedside  · Stay as active as you can. Balance, flexibility, strength, and endurance all come from exercise. They all play a role in preventing falls. Ask your healthcare provider which types of activity are right for you.  · Get your vision checked on a regular basis.  · If you have pets, know where they are before you stand up or walk so you don't trip over them.  · Use night lights.  Date Last Reviewed: 11/5/2015  © 5518-7693 [employer]. [address] [place] [address], [place] [address]. All rights reserved. This information is not intended as a substitute for professional medical care. Always follow your healthcare professional's instructions.        Diet: Diabetes  Food is an important tool that you can use to control diabetes and stay healthy. Eating well-balanced meals in the correct amounts will help you control your blood glucose levels and prevent low blood sugar reactions. It will also help you reduce the health risks of diabetes. There is no one specific diet that is right for everyone with diabetes. But there are general guidelines to follow. A registered dietitian (DAPHEN) will create a tailored diet approach that’s just right for you. He or she will also help you plan healthy meals and snacks. If you have any questions, call your dietitian for advice.     Guidelines for success  Talk with your healthcare provider before starting a diabetes diet or weight loss program. If you haven't talked with a dietitian yet, ask your provider for a referral. The following guidelines can help you succeed:  · Select foods from the 6 food groups below. Your dietitian will help you find food choices within each group. He or she will also show you serving sizes and how many servings you can have at each meal.  ? Grains, beans, and starchy vegetables  ? Vegetables  ? Fruit  ? Milk or yogurt  ? Meat, poultry, fish, or tofu  ? Healthy fats  · Check your blood sugar levels as directed by your provider. Take any medicine as prescribed by your provider.  · Learn to read food labels and pick the right portion sizes.  · Eat only the amount of food in your meal plan. Eat about the same amount of food at regular times each day. Don’t skip meals. Eat meals 4 to 5 hours apart, with snacks in between.  · Limit alcohol. It raises blood sugar levels. Drink water or calorie-free diet drinks that use safe sweeteners.  · Eat less fat to help lower your risk of heart  disease. Use nonfat or low-fat dairy products and lean meats. Avoid fried foods. Use cooking oils that are unsaturated, such as olive, canola, or peanut oil.  · Talk with your dietitian about safe sugar substitutes.  · Avoid added salt. It can contribute to high blood pressure, which can cause heart disease. People with diabetes already have a risk of high blood pressure and heart disease.  · Stay at a healthy weight. If you need to lose weight, cut down on your portion sizes. But don’t skip meals. Exercise is an important part of any weight management program. Talk with your provider about an exercise program that’s right for you.  · For more information about the best diet plan for you, talk with a registered dietitian (RD). To find an RD in your area, contact:  ? Academy of Nutrition and Dietetics www.eatright.org  ? The American Diabetes Association 208-833-4379 www.diabetes.org  Date Last Reviewed: 8/1/2016 © 2000-2018 [employer]. [address] [place] [address]. All rights reserved. This information is not intended as a substitute for professional medical care. Always follow your healthcare professional's instructions.        Eating Heart-Healthy Food: Using the DASH Plan    Eating for your heart doesn’t have to be hard or boring. You just need to know how to make healthier choices. The DASH eating plan has been developed to help you do just that. DASH stands for Dietary Approaches to Stop Hypertension. It is a plan that has been proven to be healthier for your heart and to lower your risk for high blood pressure. It can also help lower your risk for cancer, heart disease, osteoporosis, and diabetes.  Choosing from each food group  Choose foods from each of the food groups below each day. Try to get the recommended number of servings for each food group. The serving numbers are based on a diet of 2,000 calories a day. Talk with your healthcare provider if you’re not sure about your  calorie needs. Along with getting the correct servings, the DASH plan also advises less than 2,300 mg of salt (sodium) per day. Lowering sodium intake to 1,500 mg per day lowers blood pressure even more. (There's about 2,300 mg of sodium in 1 teaspoon of salt.)      Grains  Servings: 6 to 8 a day  A serving is:  · 1 slice bread  · 1 ounce dry cereal  · Half a cup cooked rice, pasta or cereal  Best choices: Whole grains and any grains high in fiber. Vegetables  Servings: 4 to 5 a day  A serving is:  · 1 cup raw leafy vegetable  · Half a cup cut-up raw or cooked vegetable  · Half a cup vegetable juice  Best choices: Fresh or frozen vegetables prepared without added salt or fat.   Fruits  Servings: 4 to 5 a day  A serving is:  · 1 medium fruit  · One-quarter cup dried fruit  · Half a cup fresh, frozen, or canned fruit  · Half a cup of 100% fruit juices  Best choices: A variety of fresh fruits of different colors. Whole fruits are a better choice than fruit juices. Low-fat or fat-free dairy  Servings: 2 to 3 a day  A serving is:  · 1 cup milk  · 1 cup yogurt  · One and a half ounces cheese  Best choices: Skim or 1% milk, low-fat or fat-free yogurt or buttermilk, and low-fat cheeses.         Lean meats, poultry, fish  Servings: 6 or fewer a day  A serving is:  · 1 ounce cooked meats, poultry, or fish  · 1 egg  Best choices: Lean poultry and fish. Trim away visible fat. Broil, grill, roast, or boil instead of frying. Remove skin from poultry before eating. Limit how much red meat you eat.  Nuts, seeds, beans  Servings: 4 to 5 a week  A serving is:  · One-third cup nuts (one and a half ounces)  · 2 tablespoons nut butter or seeds  · Half a cup cooked dry beans or legumes  Best choices: Dry roasted nuts with no salt added, lentils, kidney beans, garbanzo beans, and whole farfan beans.   Fats and oils  Servings: 2 to 3 a day  A serving is:  · 1 teaspoon vegetable oil  · 1 teaspoon soft margarine  · 1  tablespoon mayonnaise  · 2 tablespoons salad dressing  Best choices: Nut and vegetable oils (nontropical vegetable oils), such as olive and canola oil. Sweets  Servings: 5 a week or fewer  A serving is:  · 1 tablespoon sugar, maple syrup, or honey  · 1 tablespoon jam or jelly  · 1 half-ounce jelly beans (about 15)  · 1 cup lemonade  Best choices: Dried fruit can be a satisfying sweet. Choose low-fat sweets. And watch your serving sizes!      For more on the DASH eating plan, visit:  www.nhlbi.nih.gov/health/health-topics/topics/dash   [employer] last reviewed this educational content on 7/1/2019 © 2000-2020 [employer]. [address] [place] [address], [place] [address]. All rights reserved. This information is not intended as a substitute for professional medical care. Always follow your healthcare professional's instructions.        High Blood Sugar (Hyperglycemia)  Too much sugar (glucose) in your blood is called high blood sugar (hyperglycemia). This can lead to a dangerous condition called ketoacidosis. In severe cases, it can lead to fluid loss (dehydration) and coma.   Possible causes of high blood sugar   · Having a poor treatment plan for diabetes   · Being sick  · Being under stress  · Taking certain medicines, such as steroids  · Eating too much food, especially carbohydrates  · Being less active than normal  · Not taking enough diabetes medicine    Symptoms of high blood sugar   High blood sugar may not cause symptoms. If you do have symptoms, they may include:   · Thirst  · Frequent need to urinate  · Feeling tired or drowsy  · Nausea and vomiting  · Itchy, dry skin  · Blurry vision  · Fast breathing and breath that smells fruity   · Weakness  · Dizziness  · Wounds or skin infections that don’t heal  · Unexplained weight loss if hyperglycemia lasts for more than a few days   What to do   Do the following:   · Check your blood sugar.  · Drink plenty of sugar-free, caffeine-free liquids such as water.  Don’t drink fruit juice.  · Check your blood sugar again every 4 hours. If you take insulin or diabetes medicines, follow your sick-day plan for taking medicine. Call your healthcare provider if you are not able to eat.  · Check your blood or urine for ketones as directed.  · Call your provider if your blood sugar and ketones don't go back to your target range.  · If the value is high, take your diabetes medicines as prescribed. And check your blood sugar more often. Doses of medicines such as insulin can be increased slightly if blood sugars stay high. But your provider must approve this.     When you have hyperglycemia, drink plenty of water or other sugar-free, caffeine-free liquids.   Preventing high blood sugar  To help keep your blood sugar from getting too high:  · Control stress.  · When you're ill, follow your sick-day plan.   · Follow your meal plan. Eat only the amount of food on your meal plan.  · Stick to your exercise plan.  · Take your insulin or diabetes medicines as directed by your healthcare team. Also test your blood sugar as directed. If the plan is not working for you, discuss it with your healthcare provider.  Other things to do  · Carry a medical ID card or a compact USB drive. Or wear a medical alert bracelet or necklace. It should say that you have diabetes. It should also say what to do in case you pass out or go into a coma.  · Make sure family, friends, and coworkers know the signs of high blood sugar. Tell them what to do if your blood sugar gets very high and you can’t help yourself.  · Talk with your healthcare team about other things you can do to prevent high blood sugar.       Special note: Drink plenty of sugar-free and caffeine-free liquids when you feel symptoms of hyperglycemia. Call your healthcare provider if you keep having episodes of high blood sugar.   12Return last reviewed this educational content on 11/1/2018  © 2826-2760 [employer] [address], [place] [address]. All rights reserved. This information is not intended as a substitute for professional medical care. Always follow your healthcare professional's instructions.            Acute Pain, Uncertain Cause  Pain can be caused by many conditions that range from very minor to very serious. In some cases, though, pain comes and goes with no apparent cause.  We were not able to find the exact cause for your pain. At this time there is no sign of any serious illness causing your pain. More tests may be needed to determine the cause. In many cases, pain like this goes away by itself.  Home care  Take any medicines as prescribed. If another medicine was not prescribed for pain, you can take an over-the-counter pain medicine such as ibuprofen or acetaminophen. Use these as directed on the label.    Follow-up care  Follow up with your healthcare provider or our staff as directed.  When to seek medical advice  Call your healthcare provider for any of the following:  · Pain changes in pattern  · Pain doesn't lessen or gets worse  · New symptoms appear  · Fever of 100.4ºF (38ºC) or higher, or as directed by your healthcare provider  Date Last Reviewed: 7/26/2015  © 9761-6149 [employer]. [address] [place] [address]. All rights reserved. This information is not intended as a substitute for professional medical care. Always follow your healthcare professional's instructions.        Weakness with Uncertain Cause  Based on your exam today, the exact cause of your weakness is not certain. But your weakness does not seem to be a sign of a serious illness at this time. Keep an eye on your symptoms and get medical advice as instructed below.  Home care  · Rest at home today. Don't over-exert yourself.  · Take any medicine as prescribed.  · For the next few days, drink extra fluids (unless your healthcare provider wants you to restrict fluids for other reasons). Don't skip meals.  · Unless  otherwise directed, continue to take any prescription medicines.  · Contact your healthcare provider if you have any questions or concerns.  Follow-up care  Follow up with your healthcare provider, or as advised.  When to seek medical advice  Call your healthcare provider right away for any of the following:  · Symptoms get worse  · Symptoms don't start getting better within 2 days  · Fever of 100.4º F (38º C) or higher, or as directed by your healthcare provider  Call 911  Call 911 for any of these:  · Chest, arm, neck, jaw, or upper back pain  · Trouble breathing  · Numbness or weakness of the face, one arm, or one leg  · Slurred speech, confusion, or trouble speaking, walking, or seeing  · Blood in vomit or stool (black or red color)  · Loss of consciousness  · Severe headache  Date Last Reviewed: 10/1/2017  © 3483-6553 [employer]. [address] [place] [address]. All rights reserved. This information is not intended as a substitute for professional medical care. Always follow your healthcare professional's instructions.        Established High Blood Pressure    High blood pressure (hypertension) is a chronic disease. Often, healthcare providers don’t know what causes it. But it can be caused by certain health conditions and medicines.  If you have high blood pressure, you may not have any symptoms. If you do have symptoms, they may include headache, dizziness, changes in your vision, chest pain, and shortness of breath. But even without symptoms, high blood pressure that’s not treated raises your risk for heart attack, heart failure, and stroke. High blood pressure is a serious health risk and shouldn’t be ignored.  Blood pressure measurements are given as 2 numbers. Systolic blood pressure is the upper number. This is the pressure when the heart contracts. Diastolic blood pressure is the lower number. This is the pressure when the heart relaxes between beats. You will see your blood  pressure readings written together. For example, a person with a systolic pressure of 118 and a diastolic pressure of 78 will have 118/78 written in the medical record.  Blood pressure is categorized as normal, elevated, or stage 1 or stage 2 high blood pressure:  · Normal blood pressure is systolic of less than 120 and diastolic of less than 80 (120/80)  · Elevated blood pressure is systolic of 120 to 129 and diastolic less than 80  · Stage 1 high blood pressure is systolic is 130 to 139 or diastolic between 80 to 89  · Stage 2 high blood pressure is when systolic is 140 or higher or the diastolic is 90 or higher  Home care  If you have high blood pressure, follow these home care guidelines to help lower your blood pressure. If you are taking medicines for high blood pressure, these methods may reduce or end your need for medicines in the future.  · Start a weight-loss program if you are overweight.  · Cut back on how much salt you get in your diet. Here’s how to do this:  ? Don’t eat foods that have a lot of salt. These include olives, pickles, smoked meats, and salted potato chips.  ? Don’t add salt to your food at the table.  ? Use only small amounts of salt when cooking.  · Start an exercise program. Talk with your healthcare provider about the type of exercise program that would be best for you. It doesn't have to be hard. Even brisk walking for 20 minutes 3 times a week is a good form of exercise.  · Don’t take medicines that stimulate the heart. This includes many over-the-counter cold and sinus decongestant pills and sprays, as well as diet pills. Check the warnings about high blood pressure on the label. Before buying any over-the-counter medicines or supplements, always ask the pharmacist about the product's potential interaction with your high blood pressure and your high blood pressure medicines.  · Stimulants such as amphetamine or cocaine could be deadly for someone with high blood pressure. Never  take these.  · Limit how much caffeine you get in your diet. Switch to caffeine-free products.  · Stop smoking. If you are a long-time smoker, this can be hard. Talk to your healthcare provider about medicines and nicotine replacement options to help you. Also, enroll in a stop-smoking program to make it more likely that you will quit for good.  · Learn how to handle stress. This is an important part of any program to lower blood pressure. Learn about relaxation methods like meditation, yoga, or biofeedback.  · If your provider prescribed medicines, take them exactly as directed. Missing doses may cause your blood pressure get out of control.  · If you miss a dose or doses, check with your healthcare provider or pharmacist about what to do.  · Consider buying an automatic blood pressure machine to check your blood pressure at home. Ask your provider for a recommendation. You can get one of these at most pharmacies.     The American Heart Association recommends the following guidelines for home blood pressure monitoring:  · Don't smoke or drink coffee for 30 minutes before taking your blood pressure.  · Go to the bathroom before the test.  · Relax for 5 minutes before taking the measurement.  · Sit with your back supported (don't sit on a couch or soft chair); keep your feet on the floor uncrossed. Place your arm on a solid flat surface (like a table) with the upper part of the arm at heart level. Place the middle of the cuff directly above the bend of the elbow. Check the monitor's instruction manual for an illustration.  · Take multiple readings. When you measure, take 2 to 3 readings one minute apart and record all of the results.  · Take your blood pressure at the same time every day, or as your healthcare provider recommends.  · Record the date, time, and blood pressure reading.  · Take the record with you to your next medical appointment. If your blood pressure monitor has a built-in memory, simply take the  monitor with you to your next appointment.  · Call your provider if you have several high readings. Don't be frightened by a single high blood pressure reading, but if you get several high readings, check in with your healthcare provider.  · Note: When blood pressure reaches a systolic (top number) of 180 or higher OR diastolic (bottom number) of 110 or higher, seek emergency medical treatment.  Follow-up care  You will need to see your healthcare provider regularly. This is to check your blood pressure and to make changes to your medicines. Make a follow-up appointment as directed. Bring the record of your home blood pressure readings to the appointment.  When to seek medical advice  Call your healthcare provider right away if any of these occur:  · Blood pressure reaches a systolic (upper number) of 180 or higher OR a diastolic (bottom number) of 110 or higher  · Chest pain or shortness of breath  · Severe headache  · Throbbing or rushing sound in the ears  · Nosebleed  · Sudden severe pain in your belly (abdomen)  · Extreme drowsiness, confusion, or fainting  · Dizziness or spinning sensation (vertigo)  · Weakness of an arm or leg or one side of the face  · You have problems speaking or seeing   Date Last Reviewed: 12/1/2016  © 7987-3738 [employer]. [address] [place] [address], [place] [address]. All rights reserved. This information is not intended as a substitute for professional medical care. Always follow your healthcare professional's instructions.         done

## 2023-10-10 NOTE — ASU PREOP CHECKLIST - NEEDLE GAUGE
----- Message from SYLWIA Charles sent at 9/20/2023  8:41 AM EDT -----  Regarding: RE: medication  Thank you for the update.  Okay to send in losartan 100 mg daily #90 refill 1.  Patient can cut the losartan tablet in half and take it twice daily.  ----- Message -----  From: Sapna Hankins MA  Sent: 9/20/2023   8:01 AM EDT  To: SYLWIA Charles  Subject: RE: medication                                   Insurance can only cover 1 a day so we will have to send 100 mg to be take half BID.    ----- Message -----  From: Lady Sullivan APRN  Sent: 9/18/2023   4:18 PM EDT  To: Sapna Hankins MA  Subject: RE: medication                                   Please let Silvia know that I have resent in her losartan.  The prescription is written as 50 mg, take 2 tablets daily.  This should be covered by insurance and she can continue taking the losartan 50 mg twice daily.  ----- Message -----  From: Sapna Hankins MA  Sent: 9/18/2023   1:39 PM EDT  To: SYLWIA Charles  Subject: FW: medication                                     ----- Message -----  From: Casandra Hayes  Sent: 9/18/2023   1:28 PM EDT  To: Sapna Hankins MA  Subject: medication                                       Patient phoned stating that she is at the pharmacy to  her losartan 50 mg to 100 mg so that her insurance will pay for it. If you could give her a call please.              22

## 2023-10-13 ENCOUNTER — APPOINTMENT (OUTPATIENT)
Dept: RADIOLOGY | Facility: CLINIC | Age: 73
End: 2023-10-13
Payer: MEDICARE

## 2023-10-13 ENCOUNTER — APPOINTMENT (OUTPATIENT)
Dept: MRI IMAGING | Facility: CLINIC | Age: 73
End: 2023-10-13
Payer: MEDICARE

## 2023-10-13 ENCOUNTER — OUTPATIENT (OUTPATIENT)
Dept: OUTPATIENT SERVICES | Facility: HOSPITAL | Age: 73
LOS: 1 days | End: 2023-10-13
Payer: MEDICARE

## 2023-10-13 DIAGNOSIS — R14.0 ABDOMINAL DISTENSION (GASEOUS): ICD-10-CM

## 2023-10-13 DIAGNOSIS — Z90.49 ACQUIRED ABSENCE OF OTHER SPECIFIED PARTS OF DIGESTIVE TRACT: Chronic | ICD-10-CM

## 2023-10-13 PROCEDURE — 72148 MRI LUMBAR SPINE W/O DYE: CPT

## 2023-10-13 PROCEDURE — 72148 MRI LUMBAR SPINE W/O DYE: CPT | Mod: 26,MH

## 2023-10-16 ENCOUNTER — TRANSCRIPTION ENCOUNTER (OUTPATIENT)
Age: 73
End: 2023-10-16

## 2023-10-17 LAB
SURGICAL PATHOLOGY STUDY: SIGNIFICANT CHANGE UP
SURGICAL PATHOLOGY STUDY: SIGNIFICANT CHANGE UP

## 2023-10-24 ENCOUNTER — APPOINTMENT (OUTPATIENT)
Dept: SURGICAL ONCOLOGY | Facility: CLINIC | Age: 73
End: 2023-10-24
Payer: MEDICARE

## 2023-10-24 VITALS
DIASTOLIC BLOOD PRESSURE: 72 MMHG | HEART RATE: 94 BPM | OXYGEN SATURATION: 99 % | WEIGHT: 147 LBS | SYSTOLIC BLOOD PRESSURE: 126 MMHG | BODY MASS INDEX: 23.63 KG/M2 | HEIGHT: 66 IN | TEMPERATURE: 97.7 F

## 2023-10-24 DIAGNOSIS — C90.00 MULTIPLE MYELOMA NOT HAVING ACHIEVED REMISSION: ICD-10-CM

## 2023-10-24 PROCEDURE — 99203 OFFICE O/P NEW LOW 30 MIN: CPT

## 2023-11-13 PROCEDURE — 86905 BLOOD TYPING RBC ANTIGENS: CPT

## 2023-11-13 PROCEDURE — 86334 IMMUNOFIX E-PHORESIS SERUM: CPT

## 2023-11-13 PROCEDURE — 85045 AUTOMATED RETICULOCYTE COUNT: CPT

## 2023-11-13 PROCEDURE — 80048 BASIC METABOLIC PNL TOTAL CA: CPT

## 2023-11-13 PROCEDURE — 88237 TISSUE CULTURE BONE MARROW: CPT

## 2023-11-13 PROCEDURE — 82272 OCCULT BLD FECES 1-3 TESTS: CPT

## 2023-11-13 PROCEDURE — 97110 THERAPEUTIC EXERCISES: CPT

## 2023-11-13 PROCEDURE — 83690 ASSAY OF LIPASE: CPT

## 2023-11-13 PROCEDURE — 86705 HEP B CORE ANTIBODY IGM: CPT

## 2023-11-13 PROCEDURE — 86803 HEPATITIS C AB TEST: CPT

## 2023-11-13 PROCEDURE — 84550 ASSAY OF BLOOD/URIC ACID: CPT

## 2023-11-13 PROCEDURE — 88312 SPECIAL STAINS GROUP 1: CPT

## 2023-11-13 PROCEDURE — 83516 IMMUNOASSAY NONANTIBODY: CPT

## 2023-11-13 PROCEDURE — 85730 THROMBOPLASTIN TIME PARTIAL: CPT

## 2023-11-13 PROCEDURE — 88271 CYTOGENETICS DNA PROBE: CPT

## 2023-11-13 PROCEDURE — 86140 C-REACTIVE PROTEIN: CPT

## 2023-11-13 PROCEDURE — 87205 SMEAR GRAM STAIN: CPT

## 2023-11-13 PROCEDURE — 83540 ASSAY OF IRON: CPT

## 2023-11-13 PROCEDURE — 85097 BONE MARROW INTERPRETATION: CPT

## 2023-11-13 PROCEDURE — 20225 BONE BIOPSY TROCAR/NDL DEEP: CPT

## 2023-11-13 PROCEDURE — 99285 EMERGENCY DEPT VISIT HI MDM: CPT

## 2023-11-13 PROCEDURE — 82310 ASSAY OF CALCIUM: CPT

## 2023-11-13 PROCEDURE — 88365 INSITU HYBRIDIZATION (FISH): CPT

## 2023-11-13 PROCEDURE — 86901 BLOOD TYPING SEROLOGIC RH(D): CPT

## 2023-11-13 PROCEDURE — 88305 TISSUE EXAM BY PATHOLOGIST: CPT

## 2023-11-13 PROCEDURE — 81001 URINALYSIS AUTO W/SCOPE: CPT

## 2023-11-13 PROCEDURE — 84156 ASSAY OF PROTEIN URINE: CPT

## 2023-11-13 PROCEDURE — 74177 CT ABD & PELVIS W/CONTRAST: CPT | Mod: MA

## 2023-11-13 PROCEDURE — 88300 SURGICAL PATH GROSS: CPT

## 2023-11-13 PROCEDURE — P9040: CPT

## 2023-11-13 PROCEDURE — 96360 HYDRATION IV INFUSION INIT: CPT

## 2023-11-13 PROCEDURE — 88285 CHROMOSOME COUNT ADDITIONAL: CPT

## 2023-11-13 PROCEDURE — 83735 ASSAY OF MAGNESIUM: CPT

## 2023-11-13 PROCEDURE — 97112 NEUROMUSCULAR REEDUCATION: CPT

## 2023-11-13 PROCEDURE — 96361 HYDRATE IV INFUSION ADD-ON: CPT

## 2023-11-13 PROCEDURE — 84300 ASSAY OF URINE SODIUM: CPT

## 2023-11-13 PROCEDURE — 88313 SPECIAL STAINS GROUP 2: CPT

## 2023-11-13 PROCEDURE — 87086 URINE CULTURE/COLONY COUNT: CPT

## 2023-11-13 PROCEDURE — 36415 COLL VENOUS BLD VENIPUNCTURE: CPT

## 2023-11-13 PROCEDURE — 36430 TRANSFUSION BLD/BLD COMPNT: CPT

## 2023-11-13 PROCEDURE — 84133 ASSAY OF URINE POTASSIUM: CPT

## 2023-11-13 PROCEDURE — 80053 COMPREHEN METABOLIC PANEL: CPT

## 2023-11-13 PROCEDURE — 84540 ASSAY OF URINE/UREA-N: CPT

## 2023-11-13 PROCEDURE — 84479 ASSAY OF THYROID (T3 OR T4): CPT

## 2023-11-13 PROCEDURE — 88264 CHROMOSOME ANALYSIS 20-25: CPT

## 2023-11-13 PROCEDURE — 71260 CT THORAX DX C+: CPT | Mod: MA

## 2023-11-13 PROCEDURE — 87635 SARS-COV-2 COVID-19 AMP PRB: CPT

## 2023-11-13 PROCEDURE — 83550 IRON BINDING TEST: CPT

## 2023-11-13 PROCEDURE — 70450 CT HEAD/BRAIN W/O DYE: CPT | Mod: MA

## 2023-11-13 PROCEDURE — 88341 IMHCHEM/IMCYTCHM EA ADD ANTB: CPT

## 2023-11-13 PROCEDURE — 84466 ASSAY OF TRANSFERRIN: CPT

## 2023-11-13 PROCEDURE — 82570 ASSAY OF URINE CREATININE: CPT

## 2023-11-13 PROCEDURE — 85652 RBC SED RATE AUTOMATED: CPT

## 2023-11-13 PROCEDURE — 85025 COMPLETE CBC W/AUTO DIFF WBC: CPT

## 2023-11-13 PROCEDURE — 82728 ASSAY OF FERRITIN: CPT

## 2023-11-13 PROCEDURE — 88275 CYTOGENETICS 100-300: CPT

## 2023-11-13 PROCEDURE — 86036 ANCA SCREEN EACH ANTIBODY: CPT

## 2023-11-13 PROCEDURE — 84436 ASSAY OF TOTAL THYROXINE: CPT

## 2023-11-13 PROCEDURE — 77012 CT SCAN FOR NEEDLE BIOPSY: CPT

## 2023-11-13 PROCEDURE — 85610 PROTHROMBIN TIME: CPT

## 2023-11-13 PROCEDURE — 84100 ASSAY OF PHOSPHORUS: CPT

## 2023-11-13 PROCEDURE — 88184 FLOWCYTOMETRY/ TC 1 MARKER: CPT

## 2023-11-13 PROCEDURE — 83521 IG LIGHT CHAINS FREE EACH: CPT

## 2023-11-13 PROCEDURE — 88185 FLOWCYTOMETRY/TC ADD-ON: CPT

## 2023-11-13 PROCEDURE — 86900 BLOOD TYPING SEROLOGIC ABO: CPT

## 2023-11-13 PROCEDURE — 83935 ASSAY OF URINE OSMOLALITY: CPT

## 2023-11-13 PROCEDURE — 86038 ANTINUCLEAR ANTIBODIES: CPT

## 2023-11-13 PROCEDURE — 97116 GAIT TRAINING THERAPY: CPT

## 2023-11-13 PROCEDURE — 88342 IMHCHEM/IMCYTCHM 1ST ANTB: CPT

## 2023-11-13 PROCEDURE — 88280 CHROMOSOME KARYOTYPE STUDY: CPT

## 2023-11-13 PROCEDURE — 86850 RBC ANTIBODY SCREEN: CPT

## 2023-11-13 PROCEDURE — 86923 COMPATIBILITY TEST ELECTRIC: CPT

## 2023-11-13 PROCEDURE — 82784 ASSAY IGA/IGD/IGG/IGM EACH: CPT

## 2023-11-13 PROCEDURE — 0001U RBC DNA HEA 35 AG 11 BLD GRP: CPT

## 2023-11-13 PROCEDURE — 87340 HEPATITIS B SURFACE AG IA: CPT

## 2023-11-13 PROCEDURE — 97162 PT EVAL MOD COMPLEX 30 MIN: CPT

## 2023-11-13 PROCEDURE — 83970 ASSAY OF PARATHORMONE: CPT

## 2023-11-13 PROCEDURE — 88360 TUMOR IMMUNOHISTOCHEM/MANUAL: CPT

## 2023-11-13 PROCEDURE — 86431 RHEUMATOID FACTOR QUANT: CPT

## 2023-11-13 PROCEDURE — 82607 VITAMIN B-12: CPT

## 2023-11-13 PROCEDURE — 84155 ASSAY OF PROTEIN SERUM: CPT

## 2023-11-13 PROCEDURE — 86706 HEP B SURFACE ANTIBODY: CPT

## 2023-11-13 PROCEDURE — 93005 ELECTROCARDIOGRAM TRACING: CPT

## 2023-11-13 PROCEDURE — 84165 PROTEIN E-PHORESIS SERUM: CPT

## 2023-11-13 PROCEDURE — 84166 PROTEIN E-PHORESIS/URINE/CSF: CPT

## 2023-11-13 PROCEDURE — 82746 ASSAY OF FOLIC ACID SERUM: CPT

## 2023-11-13 PROCEDURE — 86225 DNA ANTIBODY NATIVE: CPT

## 2023-11-13 PROCEDURE — 77074 RADEX OSSEOUS SURVEY LMTD: CPT

## 2023-11-16 ENCOUNTER — APPOINTMENT (OUTPATIENT)
Dept: SURGICAL ONCOLOGY | Facility: CLINIC | Age: 73
End: 2023-11-16
Payer: MEDICARE

## 2023-11-16 VITALS
OXYGEN SATURATION: 98 % | TEMPERATURE: 98.3 F | SYSTOLIC BLOOD PRESSURE: 127 MMHG | BODY MASS INDEX: 23.65 KG/M2 | WEIGHT: 147.13 LBS | HEIGHT: 66 IN | DIASTOLIC BLOOD PRESSURE: 73 MMHG | HEART RATE: 97 BPM

## 2023-11-16 PROCEDURE — 99214 OFFICE O/P EST MOD 30 MIN: CPT

## 2023-11-28 ENCOUNTER — OUTPATIENT (OUTPATIENT)
Dept: OUTPATIENT SERVICES | Facility: HOSPITAL | Age: 73
LOS: 1 days | End: 2023-11-28
Payer: MEDICARE

## 2023-11-28 VITALS
OXYGEN SATURATION: 99 % | RESPIRATION RATE: 18 BRPM | DIASTOLIC BLOOD PRESSURE: 82 MMHG | SYSTOLIC BLOOD PRESSURE: 138 MMHG | WEIGHT: 145.28 LBS | TEMPERATURE: 98 F | HEIGHT: 66 IN | HEART RATE: 101 BPM

## 2023-11-28 DIAGNOSIS — I10 ESSENTIAL (PRIMARY) HYPERTENSION: ICD-10-CM

## 2023-11-28 DIAGNOSIS — Z01.818 ENCOUNTER FOR OTHER PREPROCEDURAL EXAMINATION: ICD-10-CM

## 2023-11-28 DIAGNOSIS — Z29.9 ENCOUNTER FOR PROPHYLACTIC MEASURES, UNSPECIFIED: ICD-10-CM

## 2023-11-28 DIAGNOSIS — R94.31 ABNORMAL ELECTROCARDIOGRAM [ECG] [EKG]: ICD-10-CM

## 2023-11-28 DIAGNOSIS — Z13.89 ENCOUNTER FOR SCREENING FOR OTHER DISORDER: ICD-10-CM

## 2023-11-28 DIAGNOSIS — K31.9 DISEASE OF STOMACH AND DUODENUM, UNSPECIFIED: ICD-10-CM

## 2023-11-28 DIAGNOSIS — Z90.49 ACQUIRED ABSENCE OF OTHER SPECIFIED PARTS OF DIGESTIVE TRACT: Chronic | ICD-10-CM

## 2023-11-28 LAB
A1C WITH ESTIMATED AVERAGE GLUCOSE RESULT: 5.3 % — SIGNIFICANT CHANGE UP (ref 4–5.6)
A1C WITH ESTIMATED AVERAGE GLUCOSE RESULT: 5.3 % — SIGNIFICANT CHANGE UP (ref 4–5.6)
ANION GAP SERPL CALC-SCNC: 17 MMOL/L — SIGNIFICANT CHANGE UP (ref 5–17)
ANION GAP SERPL CALC-SCNC: 17 MMOL/L — SIGNIFICANT CHANGE UP (ref 5–17)
APTT BLD: 30.5 SEC — SIGNIFICANT CHANGE UP (ref 24.5–35.6)
APTT BLD: 30.5 SEC — SIGNIFICANT CHANGE UP (ref 24.5–35.6)
BASOPHILS # BLD AUTO: 0.01 K/UL — SIGNIFICANT CHANGE UP (ref 0–0.2)
BASOPHILS # BLD AUTO: 0.01 K/UL — SIGNIFICANT CHANGE UP (ref 0–0.2)
BASOPHILS NFR BLD AUTO: 0.1 % — SIGNIFICANT CHANGE UP (ref 0–2)
BASOPHILS NFR BLD AUTO: 0.1 % — SIGNIFICANT CHANGE UP (ref 0–2)
BLD GP AB SCN SERPL QL: SIGNIFICANT CHANGE UP
BLD GP AB SCN SERPL QL: SIGNIFICANT CHANGE UP
BUN SERPL-MCNC: 15.8 MG/DL — SIGNIFICANT CHANGE UP (ref 8–20)
BUN SERPL-MCNC: 15.8 MG/DL — SIGNIFICANT CHANGE UP (ref 8–20)
CALCIUM SERPL-MCNC: 9.3 MG/DL — SIGNIFICANT CHANGE UP (ref 8.4–10.5)
CALCIUM SERPL-MCNC: 9.3 MG/DL — SIGNIFICANT CHANGE UP (ref 8.4–10.5)
CHLORIDE SERPL-SCNC: 103 MMOL/L — SIGNIFICANT CHANGE UP (ref 96–108)
CHLORIDE SERPL-SCNC: 103 MMOL/L — SIGNIFICANT CHANGE UP (ref 96–108)
CO2 SERPL-SCNC: 22 MMOL/L — SIGNIFICANT CHANGE UP (ref 22–29)
CO2 SERPL-SCNC: 22 MMOL/L — SIGNIFICANT CHANGE UP (ref 22–29)
COMMENT - BLOOD BANK: SIGNIFICANT CHANGE UP
COMMENT - BLOOD BANK: SIGNIFICANT CHANGE UP
CREAT SERPL-MCNC: 1.1 MG/DL — SIGNIFICANT CHANGE UP (ref 0.5–1.3)
CREAT SERPL-MCNC: 1.1 MG/DL — SIGNIFICANT CHANGE UP (ref 0.5–1.3)
DIR ANTIGLOB POLYSPECIFIC INTERPRETATION: SIGNIFICANT CHANGE UP
DIR ANTIGLOB POLYSPECIFIC INTERPRETATION: SIGNIFICANT CHANGE UP
EGFR: 71 ML/MIN/1.73M2 — SIGNIFICANT CHANGE UP
EGFR: 71 ML/MIN/1.73M2 — SIGNIFICANT CHANGE UP
EOSINOPHIL # BLD AUTO: 0 K/UL — SIGNIFICANT CHANGE UP (ref 0–0.5)
EOSINOPHIL # BLD AUTO: 0 K/UL — SIGNIFICANT CHANGE UP (ref 0–0.5)
EOSINOPHIL NFR BLD AUTO: 0 % — SIGNIFICANT CHANGE UP (ref 0–6)
EOSINOPHIL NFR BLD AUTO: 0 % — SIGNIFICANT CHANGE UP (ref 0–6)
ESTIMATED AVERAGE GLUCOSE: 105 MG/DL — SIGNIFICANT CHANGE UP (ref 68–114)
ESTIMATED AVERAGE GLUCOSE: 105 MG/DL — SIGNIFICANT CHANGE UP (ref 68–114)
GLUCOSE SERPL-MCNC: 199 MG/DL — HIGH (ref 70–99)
GLUCOSE SERPL-MCNC: 199 MG/DL — HIGH (ref 70–99)
HCT VFR BLD CALC: 30 % — LOW (ref 39–50)
HCT VFR BLD CALC: 30 % — LOW (ref 39–50)
HGB BLD-MCNC: 10 G/DL — LOW (ref 13–17)
HGB BLD-MCNC: 10 G/DL — LOW (ref 13–17)
IMM GRANULOCYTES NFR BLD AUTO: 0.4 % — SIGNIFICANT CHANGE UP (ref 0–0.9)
IMM GRANULOCYTES NFR BLD AUTO: 0.4 % — SIGNIFICANT CHANGE UP (ref 0–0.9)
INR BLD: 1.07 RATIO — SIGNIFICANT CHANGE UP (ref 0.85–1.18)
INR BLD: 1.07 RATIO — SIGNIFICANT CHANGE UP (ref 0.85–1.18)
LYMPHOCYTES # BLD AUTO: 0.67 K/UL — LOW (ref 1–3.3)
LYMPHOCYTES # BLD AUTO: 0.67 K/UL — LOW (ref 1–3.3)
LYMPHOCYTES # BLD AUTO: 10 % — LOW (ref 13–44)
LYMPHOCYTES # BLD AUTO: 10 % — LOW (ref 13–44)
MAGNESIUM SERPL-MCNC: 1.6 MG/DL — LOW (ref 1.8–2.6)
MAGNESIUM SERPL-MCNC: 1.6 MG/DL — LOW (ref 1.8–2.6)
MCHC RBC-ENTMCNC: 29.9 PG — SIGNIFICANT CHANGE UP (ref 27–34)
MCHC RBC-ENTMCNC: 29.9 PG — SIGNIFICANT CHANGE UP (ref 27–34)
MCHC RBC-ENTMCNC: 33.3 GM/DL — SIGNIFICANT CHANGE UP (ref 32–36)
MCHC RBC-ENTMCNC: 33.3 GM/DL — SIGNIFICANT CHANGE UP (ref 32–36)
MCV RBC AUTO: 89.8 FL — SIGNIFICANT CHANGE UP (ref 80–100)
MCV RBC AUTO: 89.8 FL — SIGNIFICANT CHANGE UP (ref 80–100)
MONOCYTES # BLD AUTO: 0.33 K/UL — SIGNIFICANT CHANGE UP (ref 0–0.9)
MONOCYTES # BLD AUTO: 0.33 K/UL — SIGNIFICANT CHANGE UP (ref 0–0.9)
MONOCYTES NFR BLD AUTO: 4.9 % — SIGNIFICANT CHANGE UP (ref 2–14)
MONOCYTES NFR BLD AUTO: 4.9 % — SIGNIFICANT CHANGE UP (ref 2–14)
NEUTROPHILS # BLD AUTO: 5.65 K/UL — SIGNIFICANT CHANGE UP (ref 1.8–7.4)
NEUTROPHILS # BLD AUTO: 5.65 K/UL — SIGNIFICANT CHANGE UP (ref 1.8–7.4)
NEUTROPHILS NFR BLD AUTO: 84.6 % — HIGH (ref 43–77)
NEUTROPHILS NFR BLD AUTO: 84.6 % — HIGH (ref 43–77)
PHOSPHATE SERPL-MCNC: 1.6 MG/DL — LOW (ref 2.4–4.7)
PHOSPHATE SERPL-MCNC: 1.6 MG/DL — LOW (ref 2.4–4.7)
PLATELET # BLD AUTO: 287 K/UL — SIGNIFICANT CHANGE UP (ref 150–400)
PLATELET # BLD AUTO: 287 K/UL — SIGNIFICANT CHANGE UP (ref 150–400)
POTASSIUM SERPL-MCNC: 3.5 MMOL/L — SIGNIFICANT CHANGE UP (ref 3.5–5.3)
POTASSIUM SERPL-MCNC: 3.5 MMOL/L — SIGNIFICANT CHANGE UP (ref 3.5–5.3)
POTASSIUM SERPL-SCNC: 3.5 MMOL/L — SIGNIFICANT CHANGE UP (ref 3.5–5.3)
POTASSIUM SERPL-SCNC: 3.5 MMOL/L — SIGNIFICANT CHANGE UP (ref 3.5–5.3)
PROTHROM AB SERPL-ACNC: 11.8 SEC — SIGNIFICANT CHANGE UP (ref 9.5–13)
PROTHROM AB SERPL-ACNC: 11.8 SEC — SIGNIFICANT CHANGE UP (ref 9.5–13)
RBC # BLD: 3.34 M/UL — LOW (ref 4.2–5.8)
RBC # BLD: 3.34 M/UL — LOW (ref 4.2–5.8)
RBC # FLD: 14.5 % — SIGNIFICANT CHANGE UP (ref 10.3–14.5)
RBC # FLD: 14.5 % — SIGNIFICANT CHANGE UP (ref 10.3–14.5)
SODIUM SERPL-SCNC: 142 MMOL/L — SIGNIFICANT CHANGE UP (ref 135–145)
SODIUM SERPL-SCNC: 142 MMOL/L — SIGNIFICANT CHANGE UP (ref 135–145)
WBC # BLD: 6.69 K/UL — SIGNIFICANT CHANGE UP (ref 3.8–10.5)
WBC # BLD: 6.69 K/UL — SIGNIFICANT CHANGE UP (ref 3.8–10.5)
WBC # FLD AUTO: 6.69 K/UL — SIGNIFICANT CHANGE UP (ref 3.8–10.5)
WBC # FLD AUTO: 6.69 K/UL — SIGNIFICANT CHANGE UP (ref 3.8–10.5)

## 2023-11-28 PROCEDURE — 93005 ELECTROCARDIOGRAM TRACING: CPT

## 2023-11-28 PROCEDURE — G0463: CPT

## 2023-11-28 PROCEDURE — 93010 ELECTROCARDIOGRAM REPORT: CPT

## 2023-11-28 RX ORDER — CEFAZOLIN SODIUM 1 G
2000 VIAL (EA) INJECTION ONCE
Refills: 0 | Status: DISCONTINUED | OUTPATIENT
Start: 2023-12-06 | End: 2023-12-06

## 2023-11-28 NOTE — H&P PST ADULT - PROBLEM SELECTOR PLAN 1
72 y/o M with a PMHx of newly diagnosed multiple myeloma, on Darzalex and Velcade, HTN, Kidney stones, BPH, anemia, compression fractures to lower back due to myeloma scheduled for robotic assisted gastrectomy, possible open on 12/6/2023.       -Medical evaluation pending  -Cardiac evaluation pending   - NPO after midnight the night before surgery except for meds  -Educated on NSAIDS, multivitamins and herbals that increase the risk of bleeding and need to be stopped 5 days before procedure  -Educated on infection prevention  -Tylenol can be taken 5 days before surgery if needed for pain  -Will continue all other medications as prescribed  -Verbalized understanding of all instructions.

## 2023-11-28 NOTE — H&P PST ADULT - ASSESSMENT
74 y/o M with a PMHx of newly diagnosed multiple myeloma, on Darzalex and Velcade, HTN, Kidney stones, BPH, anemia, compression fractures to lower back due to myeloma scheduled for robotic assisted gastrectomy, possible open on 2023.       -Medical evaluation pending  -Cardiac evaluation pending   - NPO after midnight the night before surgery except for meds  -Educated on NSAIDS, multivitamins and herbals that increase the risk of bleeding and need to be stopped 5 days before procedure  -Educated on infection prevention  -Tylenol can be taken 5 days before surgery if needed for pain  -Will continue all other medications as prescribed  -Verbalized understanding of all instructions.      OPIOID RISK TOOL    TAMAR EACH BOX THAT APPLIES AND ADD TOTALS AT THE END    FAMILY HISTORY OF SUBSTANCE ABUSE                 FEMALE         MALE                                                Alcohol                             [  ]1 pt          [  ]3pts                                               Illegal Durgs                     [  ]2 pts        [  ]3pts                                               Rx Drugs                           [  ]4 pts        [  ]4 pts    PERSONAL HISTORY OF SUBSTANCE ABUSE                                                                                          Alcohol                             [  ]3 pts       [  ]3 pts                                               Illegal Drugs                     [  ]4 pts        [  ]4 pts                                               Rx Drugs                           [  ]5 pts        [  ]5 pts    AGE BETWEEN 16-45 YEARS                                      [  ]1 pt         [  ]1 pt    HISTORY OF PREADOLESCENT   SEXUAL ABUSE                                                             [  ]3 pts        [  ]0pts    PSYCHOLOGICAL DISEASE                     ADD, OCD, Bipolar, Schizophrenia        [  ]2 pts         [  ]2 pts                      Depression                                               [  ]1 pt           [  ]1 pt           SCORING TOTAL   (add numbers and type here)              (0)                                     A score of 3 or lower indicated LOW risk for future opioid abuse  A score of 4 to 7 indicated moderate risk for future opioid abuse  A score of 8 or higher indicates a high risk for opioid abuse      CAPRINI SCORE [CLOT]    AGE RELATED RISK FACTORS                                                       MOBILITY RELATED FACTORS  [ ] Age 41-60 years                                            (1 Point)                  [ ] Bed rest                                                        (1 Point)  [x ] Age: 61-74 years                                           (2 Points)                 [ ] Plaster cast                                                   (2 Points)  [ ] Age= 75 years                                              (3 Points)                 [ ] Bed bound for more than 72 hours                 (2 Points)    DISEASE RELATED RISK FACTORS                                               GENDER SPECIFIC FACTORS  [ ] Edema in the lower extremities                       (1 Point)                  [ ] Pregnancy                                                     (1 Point)  [ ] Varicose veins                                               (1 Point)                  [ ] Post-partum < 6 weeks                                   (1 Point)             [ x] BMI > 25 Kg/m2                                            (1 Point)                  [ ] Hormonal therapy  or oral contraception          (1 Point)                 [ ] Sepsis (in the previous month)                        (1 Point)                  [ ] History of pregnancy complications                 (1 point)  [ ] Pneumonia or serious lung disease                                               [ ] Unexplained or recurrent                     (1 Point)           (in the previous month)                               (1 Point)  [ ] Abnormal pulmonary function test                     (1 Point)                 SURGERY RELATED RISK FACTORS  [ ] Acute myocardial infarction                              (1 Point)                 [ ]  Section                                             (1 Point)  [ ] Congestive heart failure (in the previous month)  (1 Point)               [ ] Minor surgery                                                  (1 Point)   [ ] Inflammatory bowel disease                             (1 Point)                 [ ] Arthroscopic surgery                                        (2 Points)  [ ] Central venous access                                      (2 Points)                [x ] General surgery lasting more than 45 minutes   (2 Points)       [ ] Stroke (in the previous month)                          (5 Points)               [ ] Elective arthroplasty                                         (5 Points)                                                                                                                                               HEMATOLOGY RELATED FACTORS                                                 TRAUMA RELATED RISK FACTORS  [ ] Prior episodes of VTE                                     (3 Points)                [ ] Fracture of the hip, pelvis, or leg                       (5 Points)  [ ] Positive family history for VTE                         (3 Points)                 [ ] Acute spinal cord injury (in the previous month)  (5 Points)  [ ] Prothrombin 21643 A                                     (3 Points)                 [ ] Paralysis  (less than 1 month)                             (5 Points)  [ ] Factor V Leiden                                             (3 Points)                  [ ] Multiple Trauma within 1 month                        (5 Points)  [ ] Lupus anticoagulants                                     (3 Points)                                                           [ ] Anticardiolipin antibodies                               (3 Points)                                                       [ ] High homocysteine in the blood                      (3 Points)                                             [ ] Other congenital or acquired thrombophilia      (3 Points)                                                [ ] Heparin induced thrombocytopenia                  (3 Points)                                          Total Score [     5     ]    Caprini Score 0 - 2:  Low Risk, No VTE Prophylaxis required for most patients, encourage ambulation  Caprini Score 3 - 6:  At Risk, pharmacologic VTE prophylaxis is indicated for most patients (in the absence of a contraindication)  Caprini Score Greater than or = 7:  High Risk, pharmacologic VTE prophylaxis is indicated for most patients (in the absence of a contraindication)

## 2023-11-28 NOTE — H&P PST ADULT - NSICDXPASTMEDICALHX_GEN_ALL_CORE_FT
PAST MEDICAL HISTORY:  WILY (acute kidney injury)     Anemia of chronic disease     At risk for sleep apnea     BPH (benign prostatic hyperplasia)     Gastric mass     H/O: compression fracture of spine     HTN (hypertension)     Kidney stones     Multiple myeloma

## 2023-11-28 NOTE — H&P PST ADULT - HISTORY OF PRESENT ILLNESS
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  ?  In early September 2023 the patient presented with weight loss and fatigue. A bone marrow biopsy showed multiple myeloma, and a CT scan at that time on 9/17/23 showed an exophytic lesion arising from the gastric fundus, increased in size since 8/2021. On this scan in September it measured approximately 5cm, from 3cm previously (2 years prior).  ?  Patient underwent an endoscopy for evaluation on 10/10, which showed a bilobed, heterogeneous mass within the 4th layer of the gastric wall, located in the fundus. The endophytic component measured about 4cm, and the exophytic component measured 4.6cm. 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 High PD-L1 score. On the endoscopy imaging, it does not appear to involve the GEJ.  ?  He now presents for evaluation and management of his GIST. He denies any symptoms related to the GIST, including difficulty swallowing or GI bleeding. He is currently on Darzalex (anti-CD38 therapy) and Velcade (bortezomib), being treated at [facility]. He is scheduled to go to every other week dosing starting in December.  Current Meds  amLODIPine Besylate 10 MG Oral Tablet; TAKE 1 TABLET DAILY FOR BLOOD  PRESSURE  Lisinopril-hydroCHLOROthiazide 10-12.5 MG Oral Tablet; TAKE 1 TABLET BY MOUTH  EVERY DAY  Tamsulosin HCl - 0.4 MG Oral Capsule   74 y/o M with a PMHx of newly diagnosed multiple myeloma, on Darzalex and Velcade, HTN, Kidney stones, BPH, anemia, compression fractures to lower back due to myeloma presents to PST today. Pt. daughter reports 8/2023 c/o weight los, fatigue, decreased appetite. A bone marrow biopsy showed multiple myeloma, and a CT scan at that time on 9/17/23 showed an exophytic lesion arising from the gastric fundus, increased in size since 8/2021. Pt's daughter reports that she wasn't aware of lesion 2 years prior. Patient underwent an endoscopy for evaluation on 10/10, which showed a bilobed, heterogeneous mass within the 4th layer of the gastric wall, located in the fundus. Currently remains with no appetite and poor taste. Denies any N/V. Currently takes oxycodone and morphine for pain with relief. C/o constipation and takes miralax with positive effect.     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  ?  In early September 2023 the patient presented with weight loss and fatigue. A bone marrow biopsy showed multiple myeloma, and a CT scan at that time on 9/17/23 showed an exophytic lesion arising from the gastric fundus, increased in size since 8/2021. On this scan in September it measured approximately 5cm, from 3cm previously (2 years prior).  ?  Patient underwent an endoscopy for evaluation on 10/10, which showed a bilobed, heterogeneous mass within the 4th layer of the gastric wall, located in the fundus. The endophytic component measured about 4cm, and the exophytic component measured 4.6cm. 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 High PD-L1 score. On the endoscopy imaging, it does not appear to involve the GEJ.  ?  He now presents for evaluation and management of his GIST. He denies any symptoms related to the GIST, including difficulty swallowing or GI bleeding. He is currently on Darzalex (anti-CD38 therapy) and Velcade (bortezomib), [place] [facility] He is scheduled to go to every other week dosing starting in December.  Current Meds  amLODIPine Besylate 10 MG Oral Tablet; TAKE 1 TABLET DAILY FOR BLOOD  PRESSURE  Lisinopril-hydroCHLOROthiazide 10-12.5 MG Oral Tablet; TAKE 1 TABLET BY MOUTH  EVERY DAY  Tamsulosin HCl - 0.4 MG Oral Capsule   74 y/o M with a PMHx of newly diagnosed multiple myeloma, on Darzalex and Velcade, HTN, Kidney stones, BPH, anemia, compression fractures to lower back due to myeloma presents to PST today. Pt. daughter reports 8/2023 c/o weight los, fatigue, decreased appetite. A bone marrow biopsy showed multiple myeloma, and a CT scan at that time on 9/17/23 showed an exophytic lesion arising from the gastric fundus, increased in size since 8/2021. Pt's daughter reports that she wasn't aware of lesion 2 years prior. Patient underwent an endoscopy for evaluation on 10/10, which showed a bilobed, heterogeneous mass within the 4th layer of the gastric wall, located in the fundus as reviewed by Dr. Chong. Currently remains with no appetite and poor taste. Denies any N/V. Currently takes oxycodone and morphine for pain with relief. C/o constipation and takes miralax with positive effect. Scheduled for robotic assisted gastrectomy, possible open on 12/6/2023.

## 2023-11-28 NOTE — H&P PST ADULT - MUSCULOSKELETAL
details… no joint swelling/no joint erythema/strength 5/5 bilateral upper extremities/strength 5/5 bilateral lower extremities/abnormal gait

## 2023-11-28 NOTE — H&P PST ADULT - NSICDXFAMILYHX_GEN_ALL_CORE_FT
FAMILY HISTORY:  Father  Still living? Unknown  FH: CAD (coronary artery disease), Age at diagnosis: Age Unknown    Mother  Still living? Unknown  FH: diabetes mellitus, Age at diagnosis: Age Unknown    Sibling  Still living? Unknown  Family history of bone cancer, Age at diagnosis: Age Unknown  FH: leukemia, Age at diagnosis: Age Unknown

## 2023-11-28 NOTE — CHART NOTE - NSCHARTNOTEFT_GEN_A_CORE
Patient Demographic Information (PDI)       PDI	First Name	Last Name	Birth Date	Gender	Street Address	[facility]	Zip Code  LANCE Fontaine	1950	Male	[facility]	[address]    Prescription Information      PDI Filter:    PDI	Current Rx	Drug Type	Rx Written	Rx Dispensed	Drug	Quantity	Days Supply	Prescriber Name	Prescriber ANJU #	Payment Method	Dispenser  A	N	O	10/23/2023	10/25/2023	morphine sulf er 15 mg tablet	60	30	Hon Migel Peacock MD	[id number]	[facility] #[id number]  A	N	O	10/23/2023	10/25/2023	oxycodone hcl (ir) 5 mg tablet	75	12	Hon Migel Peacock MD	[id number]	[facility] #[id number]  A	N	O	10/09/2023	10/10/2023	oxycodone hcl (ir) 5 mg tablet	30	10	Hon Migel Peacock MD	[id number]	[facility] #[id number]

## 2023-11-29 PROBLEM — Z91.89 OTHER SPECIFIED PERSONAL RISK FACTORS, NOT ELSEWHERE CLASSIFIED: Chronic | Status: ACTIVE | Noted: 2023-11-28

## 2023-12-01 ENCOUNTER — APPOINTMENT (OUTPATIENT)
Dept: CARDIOLOGY | Facility: CLINIC | Age: 73
End: 2023-12-01
Payer: MEDICARE

## 2023-12-01 ENCOUNTER — OUTPATIENT (OUTPATIENT)
Dept: OUTPATIENT SERVICES | Facility: HOSPITAL | Age: 73
LOS: 1 days | End: 2023-12-01
Payer: MEDICARE

## 2023-12-01 ENCOUNTER — NON-APPOINTMENT (OUTPATIENT)
Age: 73
End: 2023-12-01

## 2023-12-01 VITALS
DIASTOLIC BLOOD PRESSURE: 75 MMHG | BODY MASS INDEX: 23.46 KG/M2 | WEIGHT: 146 LBS | OXYGEN SATURATION: 95 % | HEIGHT: 66 IN | SYSTOLIC BLOOD PRESSURE: 149 MMHG | HEART RATE: 85 BPM

## 2023-12-01 DIAGNOSIS — Z90.49 ACQUIRED ABSENCE OF OTHER SPECIFIED PARTS OF DIGESTIVE TRACT: Chronic | ICD-10-CM

## 2023-12-01 DIAGNOSIS — Z01.812 ENCOUNTER FOR PREPROCEDURAL LABORATORY EXAMINATION: ICD-10-CM

## 2023-12-01 DIAGNOSIS — I10 ESSENTIAL (PRIMARY) HYPERTENSION: ICD-10-CM

## 2023-12-01 PROCEDURE — 99214 OFFICE O/P EST MOD 30 MIN: CPT

## 2023-12-01 PROCEDURE — 93000 ELECTROCARDIOGRAM COMPLETE: CPT

## 2023-12-05 ENCOUNTER — TRANSCRIPTION ENCOUNTER (OUTPATIENT)
Age: 73
End: 2023-12-05

## 2023-12-06 ENCOUNTER — TRANSCRIPTION ENCOUNTER (OUTPATIENT)
Age: 73
End: 2023-12-06

## 2023-12-06 ENCOUNTER — APPOINTMENT (OUTPATIENT)
Dept: SURGICAL ONCOLOGY | Facility: HOSPITAL | Age: 73
End: 2023-12-06

## 2023-12-06 ENCOUNTER — RESULT REVIEW (OUTPATIENT)
Age: 73
End: 2023-12-06

## 2023-12-06 ENCOUNTER — INPATIENT (INPATIENT)
Facility: HOSPITAL | Age: 73
LOS: 1 days | Discharge: HOME | DRG: 326 | End: 2023-12-08
Attending: STUDENT IN AN ORGANIZED HEALTH CARE EDUCATION/TRAINING PROGRAM | Admitting: STUDENT IN AN ORGANIZED HEALTH CARE EDUCATION/TRAINING PROGRAM
Payer: MEDICARE

## 2023-12-06 VITALS
DIASTOLIC BLOOD PRESSURE: 75 MMHG | HEART RATE: 107 BPM | TEMPERATURE: 100 F | WEIGHT: 145.28 LBS | OXYGEN SATURATION: 100 % | SYSTOLIC BLOOD PRESSURE: 121 MMHG | HEIGHT: 65.98 IN | RESPIRATION RATE: 18 BRPM

## 2023-12-06 DIAGNOSIS — Z90.49 ACQUIRED ABSENCE OF OTHER SPECIFIED PARTS OF DIGESTIVE TRACT: Chronic | ICD-10-CM

## 2023-12-06 DIAGNOSIS — C49.A0 GASTROINTESTINAL STROMAL TUMOR, UNSPECIFIED SITE: ICD-10-CM

## 2023-12-06 DIAGNOSIS — K31.9 DISEASE OF STOMACH AND DUODENUM, UNSPECIFIED: ICD-10-CM

## 2023-12-06 LAB
ANION GAP SERPL CALC-SCNC: 16 MMOL/L — SIGNIFICANT CHANGE UP (ref 5–17)
ANION GAP SERPL CALC-SCNC: 16 MMOL/L — SIGNIFICANT CHANGE UP (ref 5–17)
BASOPHILS # BLD AUTO: 0.01 K/UL — SIGNIFICANT CHANGE UP (ref 0–0.2)
BASOPHILS # BLD AUTO: 0.01 K/UL — SIGNIFICANT CHANGE UP (ref 0–0.2)
BASOPHILS NFR BLD AUTO: 0.1 % — SIGNIFICANT CHANGE UP (ref 0–2)
BASOPHILS NFR BLD AUTO: 0.1 % — SIGNIFICANT CHANGE UP (ref 0–2)
BLD GP AB SCN SERPL QL: SIGNIFICANT CHANGE UP
BLD GP AB SCN SERPL QL: SIGNIFICANT CHANGE UP
BUN SERPL-MCNC: 11.1 MG/DL — SIGNIFICANT CHANGE UP (ref 8–20)
BUN SERPL-MCNC: 11.1 MG/DL — SIGNIFICANT CHANGE UP (ref 8–20)
CALCIUM SERPL-MCNC: 8.3 MG/DL — LOW (ref 8.4–10.5)
CALCIUM SERPL-MCNC: 8.3 MG/DL — LOW (ref 8.4–10.5)
CHLORIDE SERPL-SCNC: 104 MMOL/L — SIGNIFICANT CHANGE UP (ref 96–108)
CHLORIDE SERPL-SCNC: 104 MMOL/L — SIGNIFICANT CHANGE UP (ref 96–108)
CO2 SERPL-SCNC: 22 MMOL/L — SIGNIFICANT CHANGE UP (ref 22–29)
CO2 SERPL-SCNC: 22 MMOL/L — SIGNIFICANT CHANGE UP (ref 22–29)
CREAT SERPL-MCNC: 1 MG/DL — SIGNIFICANT CHANGE UP (ref 0.5–1.3)
CREAT SERPL-MCNC: 1 MG/DL — SIGNIFICANT CHANGE UP (ref 0.5–1.3)
DIR ANTIGLOB POLYSPECIFIC INTERPRETATION: SIGNIFICANT CHANGE UP
DIR ANTIGLOB POLYSPECIFIC INTERPRETATION: SIGNIFICANT CHANGE UP
EGFR: 80 ML/MIN/1.73M2 — SIGNIFICANT CHANGE UP
EGFR: 80 ML/MIN/1.73M2 — SIGNIFICANT CHANGE UP
EOSINOPHIL # BLD AUTO: 0 K/UL — SIGNIFICANT CHANGE UP (ref 0–0.5)
EOSINOPHIL # BLD AUTO: 0 K/UL — SIGNIFICANT CHANGE UP (ref 0–0.5)
EOSINOPHIL NFR BLD AUTO: 0 % — SIGNIFICANT CHANGE UP (ref 0–6)
EOSINOPHIL NFR BLD AUTO: 0 % — SIGNIFICANT CHANGE UP (ref 0–6)
GLUCOSE SERPL-MCNC: 206 MG/DL — HIGH (ref 70–99)
GLUCOSE SERPL-MCNC: 206 MG/DL — HIGH (ref 70–99)
HCT VFR BLD CALC: 30.8 % — LOW (ref 39–50)
HCT VFR BLD CALC: 30.8 % — LOW (ref 39–50)
HGB BLD-MCNC: 10.1 G/DL — LOW (ref 13–17)
HGB BLD-MCNC: 10.1 G/DL — LOW (ref 13–17)
IMM GRANULOCYTES NFR BLD AUTO: 0.3 % — SIGNIFICANT CHANGE UP (ref 0–0.9)
IMM GRANULOCYTES NFR BLD AUTO: 0.3 % — SIGNIFICANT CHANGE UP (ref 0–0.9)
LACTATE SERPL-SCNC: 2.9 MMOL/L — HIGH (ref 0.5–2)
LACTATE SERPL-SCNC: 2.9 MMOL/L — HIGH (ref 0.5–2)
LYMPHOCYTES # BLD AUTO: 0.68 K/UL — LOW (ref 1–3.3)
LYMPHOCYTES # BLD AUTO: 0.68 K/UL — LOW (ref 1–3.3)
LYMPHOCYTES # BLD AUTO: 9.6 % — LOW (ref 13–44)
LYMPHOCYTES # BLD AUTO: 9.6 % — LOW (ref 13–44)
MAGNESIUM SERPL-MCNC: 1.5 MG/DL — LOW (ref 1.6–2.6)
MAGNESIUM SERPL-MCNC: 1.5 MG/DL — LOW (ref 1.6–2.6)
MCHC RBC-ENTMCNC: 29.3 PG — SIGNIFICANT CHANGE UP (ref 27–34)
MCHC RBC-ENTMCNC: 29.3 PG — SIGNIFICANT CHANGE UP (ref 27–34)
MCHC RBC-ENTMCNC: 32.8 GM/DL — SIGNIFICANT CHANGE UP (ref 32–36)
MCHC RBC-ENTMCNC: 32.8 GM/DL — SIGNIFICANT CHANGE UP (ref 32–36)
MCV RBC AUTO: 89.3 FL — SIGNIFICANT CHANGE UP (ref 80–100)
MCV RBC AUTO: 89.3 FL — SIGNIFICANT CHANGE UP (ref 80–100)
MONOCYTES # BLD AUTO: 0.15 K/UL — SIGNIFICANT CHANGE UP (ref 0–0.9)
MONOCYTES # BLD AUTO: 0.15 K/UL — SIGNIFICANT CHANGE UP (ref 0–0.9)
MONOCYTES NFR BLD AUTO: 2.1 % — SIGNIFICANT CHANGE UP (ref 2–14)
MONOCYTES NFR BLD AUTO: 2.1 % — SIGNIFICANT CHANGE UP (ref 2–14)
NEUTROPHILS # BLD AUTO: 6.23 K/UL — SIGNIFICANT CHANGE UP (ref 1.8–7.4)
NEUTROPHILS # BLD AUTO: 6.23 K/UL — SIGNIFICANT CHANGE UP (ref 1.8–7.4)
NEUTROPHILS NFR BLD AUTO: 87.9 % — HIGH (ref 43–77)
NEUTROPHILS NFR BLD AUTO: 87.9 % — HIGH (ref 43–77)
PHOSPHATE SERPL-MCNC: 2.1 MG/DL — LOW (ref 2.4–4.7)
PHOSPHATE SERPL-MCNC: 2.1 MG/DL — LOW (ref 2.4–4.7)
PLATELET # BLD AUTO: 229 K/UL — SIGNIFICANT CHANGE UP (ref 150–400)
PLATELET # BLD AUTO: 229 K/UL — SIGNIFICANT CHANGE UP (ref 150–400)
POTASSIUM SERPL-MCNC: 3.2 MMOL/L — LOW (ref 3.5–5.3)
POTASSIUM SERPL-MCNC: 3.2 MMOL/L — LOW (ref 3.5–5.3)
POTASSIUM SERPL-SCNC: 3.2 MMOL/L — LOW (ref 3.5–5.3)
POTASSIUM SERPL-SCNC: 3.2 MMOL/L — LOW (ref 3.5–5.3)
RBC # BLD: 3.45 M/UL — LOW (ref 4.2–5.8)
RBC # BLD: 3.45 M/UL — LOW (ref 4.2–5.8)
RBC # FLD: 14 % — SIGNIFICANT CHANGE UP (ref 10.3–14.5)
RBC # FLD: 14 % — SIGNIFICANT CHANGE UP (ref 10.3–14.5)
SODIUM SERPL-SCNC: 142 MMOL/L — SIGNIFICANT CHANGE UP (ref 135–145)
SODIUM SERPL-SCNC: 142 MMOL/L — SIGNIFICANT CHANGE UP (ref 135–145)
WBC # BLD: 7.09 K/UL — SIGNIFICANT CHANGE UP (ref 3.8–10.5)
WBC # BLD: 7.09 K/UL — SIGNIFICANT CHANGE UP (ref 3.8–10.5)
WBC # FLD AUTO: 7.09 K/UL — SIGNIFICANT CHANGE UP (ref 3.8–10.5)
WBC # FLD AUTO: 7.09 K/UL — SIGNIFICANT CHANGE UP (ref 3.8–10.5)

## 2023-12-06 PROCEDURE — 43235 EGD DIAGNOSTIC BRUSH WASH: CPT

## 2023-12-06 PROCEDURE — 43659 UNLISTED LAPS PX STOMACH: CPT

## 2023-12-06 PROCEDURE — 88360 TUMOR IMMUNOHISTOCHEM/MANUAL: CPT | Mod: 26

## 2023-12-06 PROCEDURE — 43659 UNLISTED LAPS PX STOMACH: CPT | Mod: 80

## 2023-12-06 PROCEDURE — 86870 RBC ANTIBODY IDENTIFICATION: CPT

## 2023-12-06 PROCEDURE — 88309 TISSUE EXAM BY PATHOLOGIST: CPT | Mod: 26

## 2023-12-06 PROCEDURE — 36415 COLL VENOUS BLD VENIPUNCTURE: CPT

## 2023-12-06 PROCEDURE — S2900 ROBOTIC SURGICAL SYSTEM: CPT | Mod: NC

## 2023-12-06 DEVICE — STAPLER COVIDIEN TRI-STAPLE 45MM PURPLE RELOAD: Type: IMPLANTABLE DEVICE | Status: FUNCTIONAL

## 2023-12-06 DEVICE — STAPLER COVIDIEN TRI-STAPLE 60MM PURPLE RELOAD: Type: IMPLANTABLE DEVICE | Status: FUNCTIONAL

## 2023-12-06 DEVICE — CLIP APPLIER COVIDIEN ENDOCLIP III 5MM: Type: IMPLANTABLE DEVICE | Status: FUNCTIONAL

## 2023-12-06 RX ORDER — LANOLIN ALCOHOL/MO/W.PET/CERES
1 CREAM (GRAM) TOPICAL
Refills: 0 | DISCHARGE

## 2023-12-06 RX ORDER — DARATUMUMAB 100 MG/5ML
0 INJECTION, SOLUTION, CONCENTRATE INTRAVENOUS
Refills: 0 | DISCHARGE

## 2023-12-06 RX ORDER — TAMSULOSIN HYDROCHLORIDE 0.4 MG/1
0.4 CAPSULE ORAL AT BEDTIME
Refills: 0 | Status: DISCONTINUED | OUTPATIENT
Start: 2023-12-06 | End: 2023-12-08

## 2023-12-06 RX ORDER — OXYCODONE HYDROCHLORIDE 5 MG/1
2.5 TABLET ORAL EVERY 4 HOURS
Refills: 0 | Status: DISCONTINUED | OUTPATIENT
Start: 2023-12-06 | End: 2023-12-08

## 2023-12-06 RX ORDER — KETOROLAC TROMETHAMINE 30 MG/ML
15 SYRINGE (ML) INJECTION EVERY 6 HOURS
Refills: 0 | Status: DISCONTINUED | OUTPATIENT
Start: 2023-12-06 | End: 2023-12-07

## 2023-12-06 RX ORDER — POTASSIUM CHLORIDE 20 MEQ
20 PACKET (EA) ORAL
Refills: 0 | Status: COMPLETED | OUTPATIENT
Start: 2023-12-06 | End: 2023-12-07

## 2023-12-06 RX ORDER — SODIUM CHLORIDE 9 MG/ML
500 INJECTION, SOLUTION INTRAVENOUS ONCE
Refills: 0 | Status: COMPLETED | OUTPATIENT
Start: 2023-12-06 | End: 2023-12-07

## 2023-12-06 RX ORDER — ACYCLOVIR SODIUM 500 MG
400 VIAL (EA) INTRAVENOUS
Refills: 0 | Status: DISCONTINUED | OUTPATIENT
Start: 2023-12-06 | End: 2023-12-08

## 2023-12-06 RX ORDER — FENTANYL CITRATE 50 UG/ML
50 INJECTION INTRAVENOUS
Refills: 0 | Status: DISCONTINUED | OUTPATIENT
Start: 2023-12-06 | End: 2023-12-06

## 2023-12-06 RX ORDER — HYOSCYAMINE SULFATE 0.13 MG
0.12 TABLET ORAL EVERY 4 HOURS
Refills: 0 | Status: DISCONTINUED | OUTPATIENT
Start: 2023-12-06 | End: 2023-12-08

## 2023-12-06 RX ORDER — SODIUM CHLORIDE 9 MG/ML
1000 INJECTION, SOLUTION INTRAVENOUS
Refills: 0 | Status: DISCONTINUED | OUTPATIENT
Start: 2023-12-06 | End: 2023-12-07

## 2023-12-06 RX ORDER — AMLODIPINE BESYLATE 2.5 MG/1
1 TABLET ORAL
Refills: 0 | DISCHARGE

## 2023-12-06 RX ORDER — ACYCLOVIR SODIUM 500 MG
1 VIAL (EA) INTRAVENOUS
Refills: 0 | DISCHARGE

## 2023-12-06 RX ORDER — OXYCODONE HYDROCHLORIDE 5 MG/1
5 TABLET ORAL EVERY 4 HOURS
Refills: 0 | Status: DISCONTINUED | OUTPATIENT
Start: 2023-12-06 | End: 2023-12-08

## 2023-12-06 RX ORDER — ONDANSETRON 8 MG/1
4 TABLET, FILM COATED ORAL EVERY 6 HOURS
Refills: 0 | Status: DISCONTINUED | OUTPATIENT
Start: 2023-12-06 | End: 2023-12-08

## 2023-12-06 RX ORDER — ONDANSETRON 8 MG/1
4 TABLET, FILM COATED ORAL ONCE
Refills: 0 | Status: DISCONTINUED | OUTPATIENT
Start: 2023-12-06 | End: 2023-12-06

## 2023-12-06 RX ORDER — PANTOPRAZOLE SODIUM 20 MG/1
40 TABLET, DELAYED RELEASE ORAL ONCE
Refills: 0 | Status: COMPLETED | OUTPATIENT
Start: 2023-12-06 | End: 2023-12-06

## 2023-12-06 RX ORDER — ACETAMINOPHEN 500 MG
1000 TABLET ORAL EVERY 6 HOURS
Refills: 0 | Status: COMPLETED | OUTPATIENT
Start: 2023-12-06 | End: 2023-12-07

## 2023-12-06 RX ORDER — LANOLIN ALCOHOL/MO/W.PET/CERES
3 CREAM (GRAM) TOPICAL AT BEDTIME
Refills: 0 | Status: DISCONTINUED | OUTPATIENT
Start: 2023-12-06 | End: 2023-12-08

## 2023-12-06 RX ORDER — MORPHINE SULFATE 50 MG/1
1 CAPSULE, EXTENDED RELEASE ORAL
Refills: 0 | DISCHARGE

## 2023-12-06 RX ORDER — SODIUM CHLORIDE 9 MG/ML
1000 INJECTION, SOLUTION INTRAVENOUS
Refills: 0 | Status: DISCONTINUED | OUTPATIENT
Start: 2023-12-06 | End: 2023-12-06

## 2023-12-06 RX ORDER — POTASSIUM CHLORIDE 20 MEQ
40 PACKET (EA) ORAL ONCE
Refills: 0 | Status: COMPLETED | OUTPATIENT
Start: 2023-12-06 | End: 2023-12-07

## 2023-12-06 RX ORDER — HEPARIN SODIUM 5000 [USP'U]/ML
5000 INJECTION INTRAVENOUS; SUBCUTANEOUS EVERY 8 HOURS
Refills: 0 | Status: DISCONTINUED | OUTPATIENT
Start: 2023-12-07 | End: 2023-12-08

## 2023-12-06 RX ORDER — HYDROMORPHONE HYDROCHLORIDE 2 MG/ML
0.5 INJECTION INTRAMUSCULAR; INTRAVENOUS; SUBCUTANEOUS
Refills: 0 | Status: DISCONTINUED | OUTPATIENT
Start: 2023-12-06 | End: 2023-12-08

## 2023-12-06 RX ORDER — AMLODIPINE BESYLATE 2.5 MG/1
10 TABLET ORAL DAILY
Refills: 0 | Status: DISCONTINUED | OUTPATIENT
Start: 2023-12-06 | End: 2023-12-08

## 2023-12-06 RX ADMIN — FENTANYL CITRATE 50 MICROGRAM(S): 50 INJECTION INTRAVENOUS at 19:30

## 2023-12-06 RX ADMIN — TAMSULOSIN HYDROCHLORIDE 0.4 MILLIGRAM(S): 0.4 CAPSULE ORAL at 22:45

## 2023-12-06 RX ADMIN — Medication 3 MILLIGRAM(S): at 22:45

## 2023-12-06 RX ADMIN — HYDROMORPHONE HYDROCHLORIDE 0.5 MILLIGRAM(S): 2 INJECTION INTRAMUSCULAR; INTRAVENOUS; SUBCUTANEOUS at 23:19

## 2023-12-06 RX ADMIN — Medication 15 MILLIGRAM(S): at 23:20

## 2023-12-06 RX ADMIN — PANTOPRAZOLE SODIUM 40 MILLIGRAM(S): 20 TABLET, DELAYED RELEASE ORAL at 19:06

## 2023-12-06 RX ADMIN — Medication 0.12 MILLIGRAM(S): at 22:45

## 2023-12-06 RX ADMIN — Medication 400 MILLIGRAM(S): at 23:23

## 2023-12-06 RX ADMIN — SODIUM CHLORIDE 75 MILLILITER(S): 9 INJECTION, SOLUTION INTRAVENOUS at 22:48

## 2023-12-06 RX ADMIN — SODIUM CHLORIDE 75 MILLILITER(S): 9 INJECTION, SOLUTION INTRAVENOUS at 19:05

## 2023-12-06 RX ADMIN — FENTANYL CITRATE 50 MICROGRAM(S): 50 INJECTION INTRAVENOUS at 19:06

## 2023-12-06 NOTE — CHART NOTE - NSCHARTNOTEFT_GEN_A_CORE
Post-op check:    Pt is a 72 yo s/p RA partial gastrectomy    Vitals:  ICU Vital Signs Last 24 Hrs  T(C): 37.1 (06 Dec 2023 20:00), Max: 37.7 (06 Dec 2023 10:28)  T(F): 98.8 (06 Dec 2023 20:00), Max: 99.8 (06 Dec 2023 10:28)  HR: 98 (06 Dec 2023 20:30) (96 - 111)  BP: 137/63 (06 Dec 2023 20:30) (121/75 - 142/65)  BP(mean): 97 (06 Dec 2023 20:30) (75 - 97)  ABP: --  ABP(mean): --  RR: 14 (06 Dec 2023 20:30) (12 - 18)  SpO2: 96% (06 Dec 2023 20:30) (95% - 100%)    O2 Parameters below as of 06 Dec 2023 20:30  Patient On (Oxygen Delivery Method): room air    Patient resting in bed. Pt denies pain at this time. Denies nausea, vomiting, SOB, chest pain. Patient has not yet voided, is not yet passing flatus and has not had a BM.    Physical exam:  General: alert, patient resting in bed comfortably, in NAD  Resp: equal chest rise bilaterally, nonlabored breathing  Cardio: RRR  Abdomen: soft, NT, ND, wounds c/d/i with dermabond in place  Extremities: moving all extremities spontaneously, pulses 2+    Plan:   -continue diet as tolerated  -pain control PRN  -encourage OOB, ambulation  -DVT ppx: SCDs Post-op check:    Pt is a 74 yo s/p RA partial gastrectomy    Vitals:  ICU Vital Signs Last 24 Hrs  T(C): 37.1 (06 Dec 2023 20:00), Max: 37.7 (06 Dec 2023 10:28)  T(F): 98.8 (06 Dec 2023 20:00), Max: 99.8 (06 Dec 2023 10:28)  HR: 98 (06 Dec 2023 20:30) (96 - 111)  BP: 137/63 (06 Dec 2023 20:30) (121/75 - 142/65)  BP(mean): 97 (06 Dec 2023 20:30) (75 - 97)  ABP: --  ABP(mean): --  RR: 14 (06 Dec 2023 20:30) (12 - 18)  SpO2: 96% (06 Dec 2023 20:30) (95% - 100%)    O2 Parameters below as of 06 Dec 2023 20:30  Patient On (Oxygen Delivery Method): room air    Patient resting in bed. Pt denies pain at this time. Denies nausea, vomiting, SOB, chest pain. Patient has voided, is not yet passing flatus and has not had a BM.    Physical exam:  General: alert, patient resting in bed comfortably, in NAD  Resp: equal chest rise bilaterally  Abdomen: soft, appropriately tender, ND, wounds c/d/i with dermabond in place  Extremities: moving all extremities spontaneously    Plan:   -continue diet as tolerated  -pain control PRN  -encourage OOB, ambulation  -DVT ppx: SCDs

## 2023-12-06 NOTE — DISCHARGE NOTE PROVIDER - NSDCCPCAREPLAN_GEN_ALL_CORE_FT
PRINCIPAL DISCHARGE DIAGNOSIS  Diagnosis: Gastrointestinal stromal tumor (GIST)  Assessment and Plan of Treatment:

## 2023-12-06 NOTE — DISCHARGE NOTE PROVIDER - HOSPITAL COURSE
This is a 73 year old male with a PMHx of multiple myeloma, HTN, kidney stones, BPH, anemia and compression fracture due to multiple myeloma, presented today for a robot assisted partial gastrectomy for a gastric Gi stromal tumor. Patient tolerated the procedure well extubated in the operating room then transferred to the PACU in stable condition and then transferred to the floor.     On POD #1 patient remained stable with no acute events overnight, Patient is hemodynamically stable, tolerating a diet, voiding, ambulating, pain well controlled.       This is a 73 year old male with a PMHx of multiple myeloma, HTN, kidney stones, BPH, anemia and compression fracture due to multiple myeloma, presented today for a robot assisted partial gastrectomy for a gastric Gi stromal tumor. Patient tolerated the procedure well extubated in the operating room then transferred to the PACU in stable condition and then transferred to the floor. Patient was advanced to full liquid diet on postoperative day 1 and tolerated well. Patient's pain is controlled, he is tolerating diet, voiding freely, and ambulated with PT. Patient has improved clinically and is stable and ready for discharge. This is a 73 year old male with a PMHx of multiple myeloma, HTN, kidney stones, BPH, anemia and compression fracture due to multiple myeloma, presented today for a robot assisted partial gastrectomy for a gastric Gi stromal tumor. Patient tolerated the procedure well extubated in the operating room then transferred to the PACU in stable condition and then transferred to the floor. Patient was advanced to full liquid diet on postoperative day 1 and tolerated well. Patient's pain is controlled, he is tolerating diet, voiding freely, and ambulated with PT. Patient has improved clinically and is stable and ready for discharge. Patient to remain on full liquid diet while at home, to be advanced in the outpatient setting This is a 73 year old male with a PMHx of multiple myeloma, HTN, kidney stones, BPH, anemia and compression fracture due to multiple myeloma, presented today for a robot assisted partial gastrectomy for a gastric Gi stromal tumor. Patient tolerated the procedure well extubated in the operating room then transferred to the PACU in stable condition and then transferred to the floor. Patient was advanced to full liquid diet on postoperative day 1 and tolerated well. Patient's pain is controlled, he is tolerating diet, voiding freely, and ambulated with PT. Patient has improved clinically and is stable and ready for discharge. Patient to remain on full liquid diet while at home, to be advanced in the outpatient setting.

## 2023-12-06 NOTE — BRIEF OPERATIVE NOTE - OPERATION/FINDINGS
Vidal was placed. Abdomen insufflated via verress needle, 5 ports placed under direct visualization, 4x 8mm and 1x 12mm with air seal. Dissection was performed, mass and stomach borders identified,  and stomach was stapled with endo GI stapler without complication. Specimen removed via 15mm endocatch. Tap Block performed and fascia closed with PDS. Skin closed with 4-0 monocryl and dermabond. Vidal was placed. Abdomen insufflated via verress needle, 5 ports placed under direct visualization, 4x 8mm and 1x 12mm with air seal. Dissection was performed, mass and stomach borders identified,  and stomach was stapled with endo GI stapler without complication. Specimen removed via 15mm endocatch. Tap Block performed and fascia closed with PDS. Skin closed with 4-0 monocryl and dermabond. Vidal cath was removed Vidal was placed. Abdomen insufflated via verress needle, 5 ports placed under direct visualization, 4x 8mm and 1x 12mm with air seal. Dissection was performed, mass and stomach borders identified,  and stomach was stapled with endo GI stapler without complication. Specimen removed via 15mm endocatch. Tap Block performed and fascia closed with PDS. Skin closed with 4-0 monocryl and dermabond. Vidal cath was removed.

## 2023-12-06 NOTE — DISCHARGE NOTE PROVIDER - CARE PROVIDER_API CALL
Moe Chong  Surgery  [address] [place] [id number]  Phone: [phone]  Fax: [phone]  Follow Up Time:    Moe Chong  Surgery  [address] [place] [id number]  Phone: [phone]  Fax: [phone]  Follow Up Time:

## 2023-12-06 NOTE — DISCHARGE NOTE PROVIDER - DETAILS OF MALNUTRITION DIAGNOSIS/DIAGNOSES
This patient has been assessed with a concern for Malnutrition and was treated during this hospitalization for the following Nutrition diagnosis/diagnoses:     -  12/08/2023: Severe protein-calorie malnutrition

## 2023-12-06 NOTE — DISCHARGE NOTE PROVIDER - NSDCMRMEDTOKEN_GEN_ALL_CORE_FT
acyclovir 400 mg oral tablet: 1 tab(s) orally 2 times a day  amLODIPine 10 mg oral tablet: 1 orally once a day  B-12 5000 mcg oral tablet, extended release: 1 tab(s) orally once a day  Darzalex 20 mg/mL intravenous solution: intravenous once a week  Flomax 0.4 mg oral capsule: 1 cap(s) orally once a day   folic acid 1 mg oral tablet: 1 tab(s) orally once a day  Melatonin 3 mg oral tablet: 1 tab(s) orally once a day  morphine 15 mg oral tablet: 1 tab(s) orally 2 times a day  oxyCODONE 5 mg oral tablet: 1 tab(s) orally as needed for  severe pain  pantoprazole 40 mg oral delayed release tablet: 1 tab(s) orally every 12 hours  polyethylene glycol 3350 oral powder for reconstitution: 17 gram(s) orally once a day as needed for  constipation   acyclovir 400 mg oral tablet: 1 tab(s) orally 2 times a day  amLODIPine 10 mg oral tablet: 1 orally once a day  B-12 5000 mcg oral tablet, extended release: 1 tab(s) orally once a day  Darzalex 20 mg/mL intravenous solution: intravenous once a week  Flomax 0.4 mg oral capsule: 1 cap(s) orally once a day   folic acid 1 mg oral tablet: 1 tab(s) orally once a day  Melatonin 3 mg oral tablet: 1 tab(s) orally once a day  morphine 15 mg oral tablet: 1 tab(s) orally 2 times a day  oxyCODONE 5 mg oral tablet: 1 tab(s) orally every 6 hours as needed for  severe pain  pantoprazole 40 mg oral delayed release tablet: 1 tab(s) orally every 12 hours  polyethylene glycol 3350 oral powder for reconstitution: 17 gram(s) orally once a day as needed for  constipation

## 2023-12-06 NOTE — BRIEF OPERATIVE NOTE - NSICDXBRIEFPREOP_GEN_ALL_CORE_FT
PRE-OP DIAGNOSIS:  Gastrointestinal stromal tumor (GIST) of stomach 06-Dec-2023 18:21:52  Melquiades Anderson

## 2023-12-06 NOTE — BRIEF OPERATIVE NOTE - NSICDXBRIEFPOSTOP_GEN_ALL_CORE_FT
POST-OP DIAGNOSIS:  Gastrointestinal stromal tumor (GIST) of stomach 06-Dec-2023 18:22:09  Melquiades Anderson

## 2023-12-06 NOTE — DISCHARGE NOTE PROVIDER - NSDCHC_MEDRECSTATUS_GEN_ALL_CORE
Admission Reconciliation is Completed  Discharge Reconciliation is Not Complete Admission Reconciliation is Not Complete  Discharge Reconciliation is Completed Admission Reconciliation is Not Complete  Discharge Reconciliation is Not Complete Admission Reconciliation is Completed  Discharge Reconciliation is Completed

## 2023-12-06 NOTE — PATIENT PROFILE ADULT - FUNCTIONAL ASSESSMENT - DAILY ACTIVITY SCORE.
Problem: PAIN - ADULT  Goal: Verbalizes/displays adequate comfort level or patient's stated pain goal  Description: INTERVENTIONS:  - Encourage pt to monitor pain and request assistance  - Assess pain using appropriate pain scale  - Administer analgesics cultural beliefs/practices regarding lactation, letdown techniques, maternal food preferences.   - Assess mother's knowledge and previous experience with breast feeding.  - Provide information as needed about early infant feeding cues (e.g., rooting, lip sm Encourage caregiver to participate in  daily care. - Assess support systems available to mother/family.  - Provide /case management support as needed.   Outcome: Progressing 18 18

## 2023-12-06 NOTE — PATIENT PROFILE ADULT - FALL HARM RISK - HARM RISK INTERVENTIONS
Assistance with ambulation/Assistance OOB with selected safe patient handling equipment/Communicate Risk of Fall with Harm to all staff/Discuss with provider need for PT consult/Monitor gait and stability/Provide patient with walking aids - walker, cane, crutches/Reinforce activity limits and safety measures with patient and family/Sit up slowly, dangle for a short time, stand at bedside before walking/Tailored Fall Risk Interventions/Use of alarms - bed, chair and/or voice tab/Visual Cue: Yellow wristband and red socks/Bed in lowest position, wheels locked, appropriate side rails in place/Call bell, personal items and telephone in reach/Instruct patient to call for assistance before getting out of bed or chair/Non-slip footwear when patient is out of bed/[place] to call system/Physically safe environment - no spills, clutter or unnecessary equipment/Purposeful Proactive Rounding/Room/bathroom lighting operational, light cord in reach Assistance with ambulation/Assistance OOB with selected safe patient handling equipment/Communicate Risk of Fall with Harm to all staff/Discuss with provider need for PT consult/Monitor gait and stability/Provide patient with walking aids - walker, cane, crutches/Reinforce activity limits and safety measures with patient and family/Sit up slowly, dangle for a short time, stand at bedside before walking/Tailored Fall Risk Interventions/Use of alarms - bed, chair and/or voice tab/Visual Cue: Yellow wristband and red socks/Bed in lowest position, wheels locked, appropriate side rails in place/Call bell, personal items and telephone in reach/Instruct patient to call for assistance before getting out of bed or chair/Non-slip footwear when patient is out of bed/[place] to call system/Physically safe environment - no spills, clutter or unnecessary equipment/Purposeful Proactive Rounding/Room/bathroom lighting operational, light cord in reach

## 2023-12-06 NOTE — DISCHARGE NOTE PROVIDER - NSDCCPTREATMENT_GEN_ALL_CORE_FT
PRINCIPAL PROCEDURE  Procedure: Robot-assisted partial gastrectomy  Findings and Treatment:   BATHING: Please do not submerge wound underwater. You may shower and/or sponge bathe.   ACTIVITY: No heavy lifting or straining. Otherwise, you may return to your usual level of physical activity. If you are taking narcotic pain medication (such as Percocet) DO NOT drive a car, operate machinery or make important decisions.  DIET: Return to your usual diet.  NOTIFY YOUR SURGEON IF: You have any bleeding that does not stop, any pus draining from your wound(s), any fever (over 100.4 F) or chills, persistent nausea/vomiting, persistent diarrhea, or if your pain is not controlled on your discharge pain medications.  FOLLOW-UP: Please follow up with your primary care physician and surgeon in 10-14 days regarding your hospitalization. Call for appointment upon discharge.        SECONDARY PROCEDURE  Procedure: EGD, intraoperative  Findings and Treatment:      PRINCIPAL PROCEDURE  Procedure: Robot-assisted partial gastrectomy  Findings and Treatment: BATHING: Please do not submerge wound underwater. You may shower and/or sponge bathe.   ACTIVITY: No heavy lifting or straining. Otherwise, you may return to your usual level of physical activity. If you are taking narcotic pain medication (such as Percocet) DO NOT drive a car, operate machinery or make important decisions.  DIET: Remain on a full liquid diet until visit in the office  NOTIFY YOUR SURGEON IF: You have any bleeding that does not stop, any pus draining from your wound(s), any fever (over 100.4 F) or chills, persistent nausea/vomiting, persistent diarrhea, or if your pain is not controlled on your discharge pain medications.  FOLLOW-UP: Please follow up with your primary care physician and surgeon in 10-14 days regarding your hospitalization. Call for appointment upon discharge.        SECONDARY PROCEDURE  Procedure: EGD, intraoperative  Findings and Treatment:

## 2023-12-07 LAB
ANION GAP SERPL CALC-SCNC: 11 MMOL/L — SIGNIFICANT CHANGE UP (ref 5–17)
ANION GAP SERPL CALC-SCNC: 11 MMOL/L — SIGNIFICANT CHANGE UP (ref 5–17)
BASOPHILS # BLD AUTO: 0 K/UL — SIGNIFICANT CHANGE UP (ref 0–0.2)
BASOPHILS # BLD AUTO: 0 K/UL — SIGNIFICANT CHANGE UP (ref 0–0.2)
BASOPHILS NFR BLD AUTO: 0 % — SIGNIFICANT CHANGE UP (ref 0–2)
BASOPHILS NFR BLD AUTO: 0 % — SIGNIFICANT CHANGE UP (ref 0–2)
BUN SERPL-MCNC: 10.9 MG/DL — SIGNIFICANT CHANGE UP (ref 8–20)
BUN SERPL-MCNC: 10.9 MG/DL — SIGNIFICANT CHANGE UP (ref 8–20)
CALCIUM SERPL-MCNC: 8.1 MG/DL — LOW (ref 8.4–10.5)
CALCIUM SERPL-MCNC: 8.1 MG/DL — LOW (ref 8.4–10.5)
CHLORIDE SERPL-SCNC: 106 MMOL/L — SIGNIFICANT CHANGE UP (ref 96–108)
CHLORIDE SERPL-SCNC: 106 MMOL/L — SIGNIFICANT CHANGE UP (ref 96–108)
CO2 SERPL-SCNC: 24 MMOL/L — SIGNIFICANT CHANGE UP (ref 22–29)
CO2 SERPL-SCNC: 24 MMOL/L — SIGNIFICANT CHANGE UP (ref 22–29)
CREAT SERPL-MCNC: 0.89 MG/DL — SIGNIFICANT CHANGE UP (ref 0.5–1.3)
CREAT SERPL-MCNC: 0.89 MG/DL — SIGNIFICANT CHANGE UP (ref 0.5–1.3)
EGFR: 91 ML/MIN/1.73M2 — SIGNIFICANT CHANGE UP
EGFR: 91 ML/MIN/1.73M2 — SIGNIFICANT CHANGE UP
EOSINOPHIL # BLD AUTO: 0 K/UL — SIGNIFICANT CHANGE UP (ref 0–0.5)
EOSINOPHIL # BLD AUTO: 0 K/UL — SIGNIFICANT CHANGE UP (ref 0–0.5)
EOSINOPHIL NFR BLD AUTO: 0 % — SIGNIFICANT CHANGE UP (ref 0–6)
EOSINOPHIL NFR BLD AUTO: 0 % — SIGNIFICANT CHANGE UP (ref 0–6)
GLUCOSE SERPL-MCNC: 138 MG/DL — HIGH (ref 70–99)
GLUCOSE SERPL-MCNC: 138 MG/DL — HIGH (ref 70–99)
HCT VFR BLD CALC: 27.7 % — LOW (ref 39–50)
HCT VFR BLD CALC: 27.7 % — LOW (ref 39–50)
HGB BLD-MCNC: 9.2 G/DL — LOW (ref 13–17)
HGB BLD-MCNC: 9.2 G/DL — LOW (ref 13–17)
IMM GRANULOCYTES NFR BLD AUTO: 0.4 % — SIGNIFICANT CHANGE UP (ref 0–0.9)
IMM GRANULOCYTES NFR BLD AUTO: 0.4 % — SIGNIFICANT CHANGE UP (ref 0–0.9)
LACTATE SERPL-SCNC: 0.9 MMOL/L — SIGNIFICANT CHANGE UP (ref 0.5–2)
LACTATE SERPL-SCNC: 0.9 MMOL/L — SIGNIFICANT CHANGE UP (ref 0.5–2)
LACTATE SERPL-SCNC: 2.1 MMOL/L — HIGH (ref 0.5–2)
LACTATE SERPL-SCNC: 2.1 MMOL/L — HIGH (ref 0.5–2)
LYMPHOCYTES # BLD AUTO: 0.56 K/UL — LOW (ref 1–3.3)
LYMPHOCYTES # BLD AUTO: 0.56 K/UL — LOW (ref 1–3.3)
LYMPHOCYTES # BLD AUTO: 10.2 % — LOW (ref 13–44)
LYMPHOCYTES # BLD AUTO: 10.2 % — LOW (ref 13–44)
MAGNESIUM SERPL-MCNC: 1.5 MG/DL — LOW (ref 1.6–2.6)
MAGNESIUM SERPL-MCNC: 1.5 MG/DL — LOW (ref 1.6–2.6)
MCHC RBC-ENTMCNC: 29.8 PG — SIGNIFICANT CHANGE UP (ref 27–34)
MCHC RBC-ENTMCNC: 29.8 PG — SIGNIFICANT CHANGE UP (ref 27–34)
MCHC RBC-ENTMCNC: 33.2 GM/DL — SIGNIFICANT CHANGE UP (ref 32–36)
MCHC RBC-ENTMCNC: 33.2 GM/DL — SIGNIFICANT CHANGE UP (ref 32–36)
MCV RBC AUTO: 89.6 FL — SIGNIFICANT CHANGE UP (ref 80–100)
MCV RBC AUTO: 89.6 FL — SIGNIFICANT CHANGE UP (ref 80–100)
MONOCYTES # BLD AUTO: 0.29 K/UL — SIGNIFICANT CHANGE UP (ref 0–0.9)
MONOCYTES # BLD AUTO: 0.29 K/UL — SIGNIFICANT CHANGE UP (ref 0–0.9)
MONOCYTES NFR BLD AUTO: 5.3 % — SIGNIFICANT CHANGE UP (ref 2–14)
MONOCYTES NFR BLD AUTO: 5.3 % — SIGNIFICANT CHANGE UP (ref 2–14)
NEUTROPHILS # BLD AUTO: 4.63 K/UL — SIGNIFICANT CHANGE UP (ref 1.8–7.4)
NEUTROPHILS # BLD AUTO: 4.63 K/UL — SIGNIFICANT CHANGE UP (ref 1.8–7.4)
NEUTROPHILS NFR BLD AUTO: 84.1 % — HIGH (ref 43–77)
NEUTROPHILS NFR BLD AUTO: 84.1 % — HIGH (ref 43–77)
PHOSPHATE SERPL-MCNC: 1.9 MG/DL — LOW (ref 2.4–4.7)
PHOSPHATE SERPL-MCNC: 1.9 MG/DL — LOW (ref 2.4–4.7)
PLATELET # BLD AUTO: 229 K/UL — SIGNIFICANT CHANGE UP (ref 150–400)
PLATELET # BLD AUTO: 229 K/UL — SIGNIFICANT CHANGE UP (ref 150–400)
POTASSIUM SERPL-MCNC: 4.4 MMOL/L — SIGNIFICANT CHANGE UP (ref 3.5–5.3)
POTASSIUM SERPL-MCNC: 4.4 MMOL/L — SIGNIFICANT CHANGE UP (ref 3.5–5.3)
POTASSIUM SERPL-SCNC: 4.4 MMOL/L — SIGNIFICANT CHANGE UP (ref 3.5–5.3)
POTASSIUM SERPL-SCNC: 4.4 MMOL/L — SIGNIFICANT CHANGE UP (ref 3.5–5.3)
RBC # BLD: 3.09 M/UL — LOW (ref 4.2–5.8)
RBC # BLD: 3.09 M/UL — LOW (ref 4.2–5.8)
RBC # FLD: 14 % — SIGNIFICANT CHANGE UP (ref 10.3–14.5)
RBC # FLD: 14 % — SIGNIFICANT CHANGE UP (ref 10.3–14.5)
SODIUM SERPL-SCNC: 141 MMOL/L — SIGNIFICANT CHANGE UP (ref 135–145)
SODIUM SERPL-SCNC: 141 MMOL/L — SIGNIFICANT CHANGE UP (ref 135–145)
WBC # BLD: 5.5 K/UL — SIGNIFICANT CHANGE UP (ref 3.8–10.5)
WBC # BLD: 5.5 K/UL — SIGNIFICANT CHANGE UP (ref 3.8–10.5)
WBC # FLD AUTO: 5.5 K/UL — SIGNIFICANT CHANGE UP (ref 3.8–10.5)
WBC # FLD AUTO: 5.5 K/UL — SIGNIFICANT CHANGE UP (ref 3.8–10.5)

## 2023-12-07 RX ORDER — MAGNESIUM SULFATE 500 MG/ML
2 VIAL (ML) INJECTION ONCE
Refills: 0 | Status: COMPLETED | OUTPATIENT
Start: 2023-12-07 | End: 2023-12-07

## 2023-12-07 RX ADMIN — Medication 0.12 MILLIGRAM(S): at 15:48

## 2023-12-07 RX ADMIN — Medication 15 MILLIGRAM(S): at 06:30

## 2023-12-07 RX ADMIN — HYDROMORPHONE HYDROCHLORIDE 0.5 MILLIGRAM(S): 2 INJECTION INTRAMUSCULAR; INTRAVENOUS; SUBCUTANEOUS at 21:58

## 2023-12-07 RX ADMIN — HYDROMORPHONE HYDROCHLORIDE 0.5 MILLIGRAM(S): 2 INJECTION INTRAMUSCULAR; INTRAVENOUS; SUBCUTANEOUS at 13:14

## 2023-12-07 RX ADMIN — HEPARIN SODIUM 5000 UNIT(S): 5000 INJECTION INTRAVENOUS; SUBCUTANEOUS at 02:18

## 2023-12-07 RX ADMIN — TAMSULOSIN HYDROCHLORIDE 0.4 MILLIGRAM(S): 0.4 CAPSULE ORAL at 21:55

## 2023-12-07 RX ADMIN — Medication 400 MILLIGRAM(S): at 18:10

## 2023-12-07 RX ADMIN — HYDROMORPHONE HYDROCHLORIDE 0.5 MILLIGRAM(S): 2 INJECTION INTRAMUSCULAR; INTRAVENOUS; SUBCUTANEOUS at 15:49

## 2023-12-07 RX ADMIN — Medication 3 MILLIGRAM(S): at 21:56

## 2023-12-07 RX ADMIN — Medication 0.12 MILLIGRAM(S): at 21:55

## 2023-12-07 RX ADMIN — HYDROMORPHONE HYDROCHLORIDE 0.5 MILLIGRAM(S): 2 INJECTION INTRAMUSCULAR; INTRAVENOUS; SUBCUTANEOUS at 18:58

## 2023-12-07 RX ADMIN — HYDROMORPHONE HYDROCHLORIDE 0.5 MILLIGRAM(S): 2 INJECTION INTRAMUSCULAR; INTRAVENOUS; SUBCUTANEOUS at 16:49

## 2023-12-07 RX ADMIN — Medication 0.12 MILLIGRAM(S): at 05:29

## 2023-12-07 RX ADMIN — AMLODIPINE BESYLATE 10 MILLIGRAM(S): 2.5 TABLET ORAL at 05:28

## 2023-12-07 RX ADMIN — Medication 1000 MILLIGRAM(S): at 00:23

## 2023-12-07 RX ADMIN — Medication 0.12 MILLIGRAM(S): at 02:18

## 2023-12-07 RX ADMIN — Medication 40 MILLIEQUIVALENT(S): at 00:43

## 2023-12-07 RX ADMIN — Medication 15 MILLIGRAM(S): at 05:30

## 2023-12-07 RX ADMIN — HYDROMORPHONE HYDROCHLORIDE 0.5 MILLIGRAM(S): 2 INJECTION INTRAMUSCULAR; INTRAVENOUS; SUBCUTANEOUS at 06:30

## 2023-12-07 RX ADMIN — Medication 25 GRAM(S): at 09:03

## 2023-12-07 RX ADMIN — HYDROMORPHONE HYDROCHLORIDE 0.5 MILLIGRAM(S): 2 INJECTION INTRAMUSCULAR; INTRAVENOUS; SUBCUTANEOUS at 09:02

## 2023-12-07 RX ADMIN — Medication 15 MILLIGRAM(S): at 13:00

## 2023-12-07 RX ADMIN — HEPARIN SODIUM 5000 UNIT(S): 5000 INJECTION INTRAVENOUS; SUBCUTANEOUS at 18:11

## 2023-12-07 RX ADMIN — HYDROMORPHONE HYDROCHLORIDE 0.5 MILLIGRAM(S): 2 INJECTION INTRAMUSCULAR; INTRAVENOUS; SUBCUTANEOUS at 05:30

## 2023-12-07 RX ADMIN — HYDROMORPHONE HYDROCHLORIDE 0.5 MILLIGRAM(S): 2 INJECTION INTRAMUSCULAR; INTRAVENOUS; SUBCUTANEOUS at 00:19

## 2023-12-07 RX ADMIN — Medication 15 MILLIGRAM(S): at 18:10

## 2023-12-07 RX ADMIN — Medication 0.12 MILLIGRAM(S): at 18:10

## 2023-12-07 RX ADMIN — Medication 50 MILLIEQUIVALENT(S): at 02:52

## 2023-12-07 RX ADMIN — HYDROMORPHONE HYDROCHLORIDE 0.5 MILLIGRAM(S): 2 INJECTION INTRAMUSCULAR; INTRAVENOUS; SUBCUTANEOUS at 21:56

## 2023-12-07 RX ADMIN — Medication 50 MILLIEQUIVALENT(S): at 00:45

## 2023-12-07 RX ADMIN — Medication 15 MILLIGRAM(S): at 18:58

## 2023-12-07 RX ADMIN — Medication 400 MILLIGRAM(S): at 12:14

## 2023-12-07 RX ADMIN — Medication 1000 MILLIGRAM(S): at 13:14

## 2023-12-07 RX ADMIN — HEPARIN SODIUM 5000 UNIT(S): 5000 INJECTION INTRAVENOUS; SUBCUTANEOUS at 09:02

## 2023-12-07 RX ADMIN — HYDROMORPHONE HYDROCHLORIDE 0.5 MILLIGRAM(S): 2 INJECTION INTRAMUSCULAR; INTRAVENOUS; SUBCUTANEOUS at 10:02

## 2023-12-07 RX ADMIN — Medication 400 MILLIGRAM(S): at 05:28

## 2023-12-07 RX ADMIN — Medication 15 MILLIGRAM(S): at 12:14

## 2023-12-07 RX ADMIN — Medication 15 MILLIGRAM(S): at 00:20

## 2023-12-07 RX ADMIN — Medication 0.12 MILLIGRAM(S): at 09:02

## 2023-12-07 RX ADMIN — HYDROMORPHONE HYDROCHLORIDE 0.5 MILLIGRAM(S): 2 INJECTION INTRAMUSCULAR; INTRAVENOUS; SUBCUTANEOUS at 12:14

## 2023-12-07 RX ADMIN — Medication 1000 MILLIGRAM(S): at 18:58

## 2023-12-07 RX ADMIN — SODIUM CHLORIDE 500 MILLILITER(S): 9 INJECTION, SOLUTION INTRAVENOUS at 00:50

## 2023-12-07 RX ADMIN — HYDROMORPHONE HYDROCHLORIDE 0.5 MILLIGRAM(S): 2 INJECTION INTRAMUSCULAR; INTRAVENOUS; SUBCUTANEOUS at 18:10

## 2023-12-07 RX ADMIN — Medication 1000 MILLIGRAM(S): at 06:28

## 2023-12-07 RX ADMIN — Medication 63.75 MILLIMOLE(S): at 10:32

## 2023-12-07 NOTE — PROGRESS NOTE ADULT - ATTENDING COMMENTS
Recovering well, pain controlled    AFVSS  Appropriately tender, incision c/d/i    PLAN:  - advance diet to full liquid diet for 1 week and will advance diet to soft at home.  - PT eval  - possible d/c tomorrow/Sat

## 2023-12-07 NOTE — PHYSICAL THERAPY INITIAL EVALUATION ADULT - PERTINENT HX OF CURRENT PROBLEM, REHAB EVAL
as per medical chart: compression fractures to lower back due to myeloma presents to PST today. Pt. daughter reports 8/2023 c/o weight los, fatigue, decreased appetite. A bone marrow biopsy showed multiple myeloma, and a CT scan at that time on 9/17/23 showed an exophytic lesion arising from the gastric fundus, increased in size since 8/2021. Pt's daughter reports that she wasn't aware of lesion 2 years prior. Patient underwent an endoscopy for evaluation on 10/10, which showed a bilobed, heterogeneous mass within the 4th layer of the gastric wall, located in the fundus as reviewed by Dr. Chong. Currently remains with no appetite and poor taste

## 2023-12-07 NOTE — PROGRESS NOTE ADULT - TIME BILLING
I spent 30 minutes reviewing the patient's chart, labs, imaging, interviewing and examining patient, discussing plan of care with the patient and coordinating care with the resident/PA team.

## 2023-12-07 NOTE — PHYSICAL THERAPY INITIAL EVALUATION ADULT - SIT-TO-STAND BALANCE
good minus Xolair Pregnancy And Lactation Text: This medication is Pregnancy Category B and is considered safe during pregnancy. This medication is excreted in breast milk.

## 2023-12-07 NOTE — PHYSICAL THERAPY INITIAL EVALUATION ADULT - BALANCE DISTURBANCE, SYSTEM IMPAIRMENT CONTRIBUTE, REHAB EVAL
"Chief Complaint   Patient presents with     Ent Problem       Initial /84  Pulse 82  Temp 98.5  F (36.9  C) (Oral)  Resp 16  Wt 127 lb (57.6 kg)  LMP 07/17/2018 (Approximate)  SpO2 99%  Breastfeeding? No  BMI 20.81 kg/m2 Estimated body mass index is 20.81 kg/(m^2) as calculated from the following:    Height as of 1/31/18: 5' 5.5\" (1.664 m).    Weight as of this encounter: 127 lb (57.6 kg).  Medication Reconciliation: complete     Ginette Zabala MA  "
musculoskeletal

## 2023-12-07 NOTE — PHYSICAL THERAPY INITIAL EVALUATION ADULT - ADDITIONAL COMMENTS
as per pt and daughter at the bedside: resides in the house with 3-4 steps (+) rail to enter, flight of steps (+) rail to the bedroom, owns and ambulates on the outdoors only with SAC, denies hx of falling, daughter available to assist as needed upon D/C home

## 2023-12-07 NOTE — PHYSICAL THERAPY INITIAL EVALUATION ADULT - LEVEL OF INDEPENDENCE: STAIR NEGOTIATION, REHAB EVAL
modified 5 steps stairs negotiation at the bedside completed, with use of the step stool and bed rail for support completed with CG

## 2023-12-08 ENCOUNTER — TRANSCRIPTION ENCOUNTER (OUTPATIENT)
Age: 73
End: 2023-12-08

## 2023-12-08 VITALS
RESPIRATION RATE: 18 BRPM | OXYGEN SATURATION: 96 % | TEMPERATURE: 98 F | SYSTOLIC BLOOD PRESSURE: 118 MMHG | HEART RATE: 106 BPM | DIASTOLIC BLOOD PRESSURE: 67 MMHG

## 2023-12-08 LAB
ANION GAP SERPL CALC-SCNC: 7 MMOL/L — SIGNIFICANT CHANGE UP (ref 5–17)
ANION GAP SERPL CALC-SCNC: 7 MMOL/L — SIGNIFICANT CHANGE UP (ref 5–17)
BASOPHILS # BLD AUTO: 0.01 K/UL — SIGNIFICANT CHANGE UP (ref 0–0.2)
BASOPHILS # BLD AUTO: 0.01 K/UL — SIGNIFICANT CHANGE UP (ref 0–0.2)
BASOPHILS NFR BLD AUTO: 0.3 % — SIGNIFICANT CHANGE UP (ref 0–2)
BASOPHILS NFR BLD AUTO: 0.3 % — SIGNIFICANT CHANGE UP (ref 0–2)
BUN SERPL-MCNC: 11.1 MG/DL — SIGNIFICANT CHANGE UP (ref 8–20)
BUN SERPL-MCNC: 11.1 MG/DL — SIGNIFICANT CHANGE UP (ref 8–20)
CALCIUM SERPL-MCNC: 8 MG/DL — LOW (ref 8.4–10.5)
CALCIUM SERPL-MCNC: 8 MG/DL — LOW (ref 8.4–10.5)
CHLORIDE SERPL-SCNC: 106 MMOL/L — SIGNIFICANT CHANGE UP (ref 96–108)
CHLORIDE SERPL-SCNC: 106 MMOL/L — SIGNIFICANT CHANGE UP (ref 96–108)
CO2 SERPL-SCNC: 26 MMOL/L — SIGNIFICANT CHANGE UP (ref 22–29)
CO2 SERPL-SCNC: 26 MMOL/L — SIGNIFICANT CHANGE UP (ref 22–29)
CREAT SERPL-MCNC: 0.94 MG/DL — SIGNIFICANT CHANGE UP (ref 0.5–1.3)
CREAT SERPL-MCNC: 0.94 MG/DL — SIGNIFICANT CHANGE UP (ref 0.5–1.3)
EGFR: 86 ML/MIN/1.73M2 — SIGNIFICANT CHANGE UP
EGFR: 86 ML/MIN/1.73M2 — SIGNIFICANT CHANGE UP
EOSINOPHIL # BLD AUTO: 0.02 K/UL — SIGNIFICANT CHANGE UP (ref 0–0.5)
EOSINOPHIL # BLD AUTO: 0.02 K/UL — SIGNIFICANT CHANGE UP (ref 0–0.5)
EOSINOPHIL NFR BLD AUTO: 0.5 % — SIGNIFICANT CHANGE UP (ref 0–6)
EOSINOPHIL NFR BLD AUTO: 0.5 % — SIGNIFICANT CHANGE UP (ref 0–6)
GLUCOSE SERPL-MCNC: 83 MG/DL — SIGNIFICANT CHANGE UP (ref 70–99)
GLUCOSE SERPL-MCNC: 83 MG/DL — SIGNIFICANT CHANGE UP (ref 70–99)
HCT VFR BLD CALC: 28.1 % — LOW (ref 39–50)
HCT VFR BLD CALC: 28.1 % — LOW (ref 39–50)
HGB BLD-MCNC: 9.1 G/DL — LOW (ref 13–17)
HGB BLD-MCNC: 9.1 G/DL — LOW (ref 13–17)
IMM GRANULOCYTES NFR BLD AUTO: 0.3 % — SIGNIFICANT CHANGE UP (ref 0–0.9)
IMM GRANULOCYTES NFR BLD AUTO: 0.3 % — SIGNIFICANT CHANGE UP (ref 0–0.9)
LYMPHOCYTES # BLD AUTO: 1.2 K/UL — SIGNIFICANT CHANGE UP (ref 1–3.3)
LYMPHOCYTES # BLD AUTO: 1.2 K/UL — SIGNIFICANT CHANGE UP (ref 1–3.3)
LYMPHOCYTES # BLD AUTO: 30.8 % — SIGNIFICANT CHANGE UP (ref 13–44)
LYMPHOCYTES # BLD AUTO: 30.8 % — SIGNIFICANT CHANGE UP (ref 13–44)
MAGNESIUM SERPL-MCNC: 2.2 MG/DL — SIGNIFICANT CHANGE UP (ref 1.6–2.6)
MAGNESIUM SERPL-MCNC: 2.2 MG/DL — SIGNIFICANT CHANGE UP (ref 1.6–2.6)
MCHC RBC-ENTMCNC: 29.4 PG — SIGNIFICANT CHANGE UP (ref 27–34)
MCHC RBC-ENTMCNC: 29.4 PG — SIGNIFICANT CHANGE UP (ref 27–34)
MCHC RBC-ENTMCNC: 32.4 GM/DL — SIGNIFICANT CHANGE UP (ref 32–36)
MCHC RBC-ENTMCNC: 32.4 GM/DL — SIGNIFICANT CHANGE UP (ref 32–36)
MCV RBC AUTO: 90.9 FL — SIGNIFICANT CHANGE UP (ref 80–100)
MCV RBC AUTO: 90.9 FL — SIGNIFICANT CHANGE UP (ref 80–100)
MONOCYTES # BLD AUTO: 0.27 K/UL — SIGNIFICANT CHANGE UP (ref 0–0.9)
MONOCYTES # BLD AUTO: 0.27 K/UL — SIGNIFICANT CHANGE UP (ref 0–0.9)
MONOCYTES NFR BLD AUTO: 6.9 % — SIGNIFICANT CHANGE UP (ref 2–14)
MONOCYTES NFR BLD AUTO: 6.9 % — SIGNIFICANT CHANGE UP (ref 2–14)
NEUTROPHILS # BLD AUTO: 2.39 K/UL — SIGNIFICANT CHANGE UP (ref 1.8–7.4)
NEUTROPHILS # BLD AUTO: 2.39 K/UL — SIGNIFICANT CHANGE UP (ref 1.8–7.4)
NEUTROPHILS NFR BLD AUTO: 61.2 % — SIGNIFICANT CHANGE UP (ref 43–77)
NEUTROPHILS NFR BLD AUTO: 61.2 % — SIGNIFICANT CHANGE UP (ref 43–77)
PHOSPHATE SERPL-MCNC: 2.9 MG/DL — SIGNIFICANT CHANGE UP (ref 2.4–4.7)
PHOSPHATE SERPL-MCNC: 2.9 MG/DL — SIGNIFICANT CHANGE UP (ref 2.4–4.7)
PLATELET # BLD AUTO: 227 K/UL — SIGNIFICANT CHANGE UP (ref 150–400)
PLATELET # BLD AUTO: 227 K/UL — SIGNIFICANT CHANGE UP (ref 150–400)
POTASSIUM SERPL-MCNC: 4.2 MMOL/L — SIGNIFICANT CHANGE UP (ref 3.5–5.3)
POTASSIUM SERPL-MCNC: 4.2 MMOL/L — SIGNIFICANT CHANGE UP (ref 3.5–5.3)
POTASSIUM SERPL-SCNC: 4.2 MMOL/L — SIGNIFICANT CHANGE UP (ref 3.5–5.3)
POTASSIUM SERPL-SCNC: 4.2 MMOL/L — SIGNIFICANT CHANGE UP (ref 3.5–5.3)
RBC # BLD: 3.09 M/UL — LOW (ref 4.2–5.8)
RBC # BLD: 3.09 M/UL — LOW (ref 4.2–5.8)
RBC # FLD: 14.3 % — SIGNIFICANT CHANGE UP (ref 10.3–14.5)
RBC # FLD: 14.3 % — SIGNIFICANT CHANGE UP (ref 10.3–14.5)
SODIUM SERPL-SCNC: 139 MMOL/L — SIGNIFICANT CHANGE UP (ref 135–145)
SODIUM SERPL-SCNC: 139 MMOL/L — SIGNIFICANT CHANGE UP (ref 135–145)
WBC # BLD: 3.9 K/UL — SIGNIFICANT CHANGE UP (ref 3.8–10.5)
WBC # BLD: 3.9 K/UL — SIGNIFICANT CHANGE UP (ref 3.8–10.5)
WBC # FLD AUTO: 3.9 K/UL — SIGNIFICANT CHANGE UP (ref 3.8–10.5)
WBC # FLD AUTO: 3.9 K/UL — SIGNIFICANT CHANGE UP (ref 3.8–10.5)

## 2023-12-08 PROCEDURE — 80048 BASIC METABOLIC PNL TOTAL CA: CPT

## 2023-12-08 PROCEDURE — S2900: CPT

## 2023-12-08 PROCEDURE — 86922 COMPATIBILITY TEST ANTIGLOB: CPT

## 2023-12-08 PROCEDURE — 86900 BLOOD TYPING SEROLOGIC ABO: CPT

## 2023-12-08 PROCEDURE — 83605 ASSAY OF LACTIC ACID: CPT

## 2023-12-08 PROCEDURE — 86880 COOMBS TEST DIRECT: CPT

## 2023-12-08 PROCEDURE — C1889: CPT

## 2023-12-08 PROCEDURE — 86850 RBC ANTIBODY SCREEN: CPT

## 2023-12-08 PROCEDURE — 88360 TUMOR IMMUNOHISTOCHEM/MANUAL: CPT

## 2023-12-08 PROCEDURE — C9399: CPT

## 2023-12-08 PROCEDURE — 85025 COMPLETE CBC W/AUTO DIFF WBC: CPT

## 2023-12-08 PROCEDURE — 36415 COLL VENOUS BLD VENIPUNCTURE: CPT

## 2023-12-08 PROCEDURE — 86901 BLOOD TYPING SEROLOGIC RH(D): CPT

## 2023-12-08 PROCEDURE — 83735 ASSAY OF MAGNESIUM: CPT

## 2023-12-08 PROCEDURE — 84100 ASSAY OF PHOSPHORUS: CPT

## 2023-12-08 PROCEDURE — 88309 TISSUE EXAM BY PATHOLOGIST: CPT

## 2023-12-08 RX ORDER — SODIUM,POTASSIUM PHOSPHATES 278-250MG
1 POWDER IN PACKET (EA) ORAL
Refills: 0 | Status: DISCONTINUED | OUTPATIENT
Start: 2023-12-08 | End: 2023-12-08

## 2023-12-08 RX ORDER — OXYCODONE HYDROCHLORIDE 5 MG/1
1 TABLET ORAL
Qty: 0 | Refills: 0 | DISCHARGE

## 2023-12-08 RX ADMIN — Medication 0.12 MILLIGRAM(S): at 10:47

## 2023-12-08 RX ADMIN — AMLODIPINE BESYLATE 10 MILLIGRAM(S): 2.5 TABLET ORAL at 06:15

## 2023-12-08 RX ADMIN — HEPARIN SODIUM 5000 UNIT(S): 5000 INJECTION INTRAVENOUS; SUBCUTANEOUS at 02:33

## 2023-12-08 RX ADMIN — Medication 0.12 MILLIGRAM(S): at 06:16

## 2023-12-08 RX ADMIN — HEPARIN SODIUM 5000 UNIT(S): 5000 INJECTION INTRAVENOUS; SUBCUTANEOUS at 10:47

## 2023-12-08 RX ADMIN — Medication 0.12 MILLIGRAM(S): at 02:33

## 2023-12-08 RX ADMIN — Medication 400 MILLIGRAM(S): at 06:16

## 2023-12-08 NOTE — DIETITIAN INITIAL EVALUATION ADULT - ADD RECOMMEND
Diet advancement per team when medically feasible  Encourage PO / ONS intake to optimize nutrition  Monitor electrolytes, replete prn  Rx: MVI daily  Trend weights/labs

## 2023-12-08 NOTE — DIETITIAN INITIAL EVALUATION ADULT - ORAL INTAKE PTA/DIET HISTORY
Met with pt at bedside this morning, he reports tolerating full liquid diet well and appetite improving (ate 100% soup, yogurt and tea this morning). Pt with ensure shakes at bedside, reports he has not been drinking them, encouraged ONS intake for protein supplementation. Pt states PTA he had poor po intake and weight loss the past two months, -178 lbs; this admission weight 145 lbs. Pt appears thin with muscle/fat wasting, meets criteria for malnutrition. He has NKFA and denies any N/V/C/D or chewing/swallowing difficulties. +F, -BM per documentation. RD to follow.

## 2023-12-08 NOTE — DISCHARGE NOTE NURSING/CASE MANAGEMENT/SOCIAL WORK - PATIENT PORTAL LINK FT
You can access the FollowMyHealth Patient Portal offered by [facility] by registering at the following website: [email or web address]. By joining [facility]’s FollowMyHealth portal, you will also be able to view your health information using other applications (apps) compatible with our system. You can access the FollowMyHealth Patient Portal offered by [facility] by registering at the following website: [email or web address]. By joining [employer]’s FollowMyHealth portal, you will also be able to view your health information using other applications (apps) compatible with our system.

## 2023-12-08 NOTE — DIETITIAN INITIAL EVALUATION ADULT - NSICDXPASTMEDICALHX_GEN_ALL_CORE_FT
PAST MEDICAL HISTORY:  WLIY (acute kidney injury)     Anemia of chronic disease     At risk for sleep apnea     BPH (benign prostatic hyperplasia)     Gastric mass     H/O: compression fracture of spine     HTN (hypertension)     Kidney stones     Multiple myeloma      PAST MEDICAL HISTORY:  WILY (acute kidney injury)     Anemia of chronic disease     At risk for sleep apnea     BPH (benign prostatic hyperplasia)     Gastric mass     H/O: compression fracture of spine     HTN (hypertension)     Kidney stones     Multiple myeloma

## 2023-12-08 NOTE — DIETITIAN NUTRITION RISK NOTIFICATION - TREATMENT: THE FOLLOWING DIET HAS BEEN RECOMMENDED
Diet, Full Liquid:   Phase 1 Bariatric Full Liquid ([id number]) (12-07-23 @ 07:30)   Diet, Full Liquid:   Phase 1 Bariatric Full Liquid ([id number]) (12-07-23 @ 07:30)

## 2023-12-08 NOTE — DIETITIAN INITIAL EVALUATION ADULT - NS FNS DIET ORDER
Diet, Full Liquid:   Phase 1 Bariatric Full Liquid ([id number]) (12-07-23 @ 07:30) Diet, Full Liquid:   Phase 1 Bariatric Full Liquid ([id number]) (12-07-23 @ 07:30)

## 2023-12-08 NOTE — PROGRESS NOTE ADULT - SUBJECTIVE AND OBJECTIVE BOX
Patient seen and examined. 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    I explained the r/b/a to this operation, including but not limited to pain, infection, bleeding requiring intervention, and damage to surrounding structures including but not limited to spleen, colon, vessels, and staple line leaks. stroke, cardiac events, and death. The patient understands these risks and agrees to operative intervention. All questions answered with the use of an .  
Subjective:  Pt offers no acute complaints. +Fl -BM Tolerating diet. Denies chest pain, fever/chills, shortness of breath, nausea, vomiting, diarrhea, headache.     STATUS POST:  partial gastrectomy    POST OPERATIVE DAY #: 2    MEDICATIONS  (STANDING):  acyclovir   Oral Tab/Cap 400 milliGRAM(s) Oral two times a day  amLODIPine   Tablet 10 milliGRAM(s) Oral daily  heparin   Injectable 5000 Unit(s) SubCutaneous every 8 hours  hyoscyamine SL 0.125 milliGRAM(s) SubLingual every 4 hours  melatonin 3 milliGRAM(s) Oral at bedtime  tamsulosin 0.4 milliGRAM(s) Oral at bedtime    MEDICATIONS  (PRN):  ondansetron Injectable 4 milliGRAM(s) IV Push every 6 hours PRN Nausea and/or Vomiting  oxyCODONE    IR 2.5 milliGRAM(s) Oral every 4 hours PRN Moderate Pain (4 - 6)  oxyCODONE    IR 5 milliGRAM(s) Oral every 4 hours PRN Severe Pain (7 - 10)      Vital Signs Last 24 Hrs  T(C): 36.8 (08 Dec 2023 04:25), Max: 36.8 (07 Dec 2023 09:14)  T(F): 98.3 (08 Dec 2023 04:25), Max: 98.3 (07 Dec 2023 09:14)  HR: 81 (08 Dec 2023 04:25) (81 - 98)  BP: 113/62 (08 Dec 2023 04:25) (106/64 - 137/70)  BP(mean): --  RR: 18 (08 Dec 2023 04:25) (18 - 18)  SpO2: 95% (08 Dec 2023 04:25) (93% - 96%)    Parameters below as of 08 Dec 2023 04:25  Patient On (Oxygen Delivery Method): room air        Physical Exam:    Constitutional: NAD  HEENT: PERRL, EOMI  Neck: No JVD, FROM without pain  Respiratory: Respirations non-labored, no accessory muscle use  Gastrointestinal: Soft, appropriately tender, mildly distended  Skin: Surgical sites well approximated with overlying Dermabond, no palpable hematoma.   Neurological: A&O x 3; without gross deficit  Musculoskeletal: No joint pain, swelling, deformity, or point tenderness; no limitation of movement      LABS:    Pending        A: 72M s/p partial gastrectomy for a GIST. Tolerating diet with return of bowel function    Plan:   -Continue on FLD with plan to advance as an outpatient  -Continue home medications  -Discharge candidate for today  -OOB  -Encourage IS  -DVT ppx
Subjective: Patient was seen and examined at bedside. No overnight events. No complaints of pain, N/V, tolerating CLD. Denies passing flatus or having BM.    MEDICATIONS  (STANDING):  acetaminophen   IVPB .. 1000 milliGRAM(s) IV Intermittent every 6 hours  acyclovir   Oral Tab/Cap 400 milliGRAM(s) Oral two times a day  amLODIPine   Tablet 10 milliGRAM(s) Oral daily  heparin   Injectable 5000 Unit(s) SubCutaneous every 8 hours  HYDROmorphone  Injectable 0.5 milliGRAM(s) IV Push every 3 hours  hyoscyamine SL 0.125 milliGRAM(s) SubLingual every 4 hours  ketorolac   Injectable 15 milliGRAM(s) IV Push every 6 hours  melatonin 3 milliGRAM(s) Oral at bedtime  tamsulosin 0.4 milliGRAM(s) Oral at bedtime    MEDICATIONS  (PRN):  ondansetron Injectable 4 milliGRAM(s) IV Push every 6 hours PRN Nausea and/or Vomiting  oxyCODONE    IR 2.5 milliGRAM(s) Oral every 4 hours PRN Moderate Pain (4 - 6)  oxyCODONE    IR 5 milliGRAM(s) Oral every 4 hours PRN Severe Pain (7 - 10)      Vital Signs Last 24 Hrs  T(C): 36.8 (07 Dec 2023 04:10), Max: 37.7 (06 Dec 2023 10:28)  T(F): 98.2 (07 Dec 2023 04:10), Max: 99.8 (06 Dec 2023 10:28)  HR: 91 (07 Dec 2023 04:10) (91 - 111)  BP: 123/67 (07 Dec 2023 04:10) (121/75 - 142/73)  BP(mean): 97 (06 Dec 2023 20:30) (75 - 97)  RR: 18 (07 Dec 2023 04:10) (12 - 18)  SpO2: 95% (07 Dec 2023 04:10) (93% - 100%)    Parameters below as of 07 Dec 2023 04:10  Patient On (Oxygen Delivery Method): room air        Physical Exam:    Constitutional: NAD, laying comfortably in bed.   HEENT: PERRL, EOMI  Neck: No JVD, FROM without pain  Respiratory: Respirations non-labored, no accessory muscle use  Gastrointestinal: Soft, non-tender, non-distended, incisions- c/d/i    LABS:                        9.2    5.50  )-----------( 229      ( 07 Dec 2023 05:10 )             27.7     12-07    141  |  106  |  10.9  ----------------------------<  138<H>  4.4   |  24.0  |  0.89    Ca    8.1<L>      07 Dec 2023 05:10  Phos  1.9     12-07  Mg     1.5     12-07        Urinalysis Basic - ( 07 Dec 2023 05:10 )    Color: x / Appearance: x / SG: x / pH: x  Gluc: 138 mg/dL / Ketone: x  / Bili: x / Urobili: x   Blood: x / Protein: x / Nitrite: x   Leuk Esterase: x / RBC: x / WBC x   Sq Epi: x / Non Sq Epi: x / Bacteria: x        Assessment: 71 yo male POD1 S/P RA partial gastrectomy for gastric GI stromal tumor.     Plan:   - SQH  - advance to full liquid diet  - Pain control   - f/u PT  -

## 2023-12-08 NOTE — DIETITIAN INITIAL EVALUATION ADULT - PERTINENT LABORATORY DATA
12-08    139  |  106  |  11.1  ----------------------------<  83  4.2   |  26.0  |  0.94    Ca    8.0<L>      08 Dec 2023 07:05  Phos  2.9     12-08  Mg     2.2     12-08    A1C with Estimated Average Glucose Result: 5.3 % (11-28-23 @ 10:55)

## 2023-12-08 NOTE — DIETITIAN INITIAL EVALUATION ADULT - OTHER INFO
72 yo male with pmhx of WILY, multiple myeloma, anemia, compression fracture of spine, kidney stones, htn, bph who presents with gastrointestinal stromal tumor of stomach, now s/p partial gastrectomy on 12/6/23.

## 2023-12-08 NOTE — DIETITIAN INITIAL EVALUATION ADULT - WEIGHT FOR BMI (LBS)
----- Message from Joanna Cruz sent at 10/30/2020 12:57 PM EDT ----- Regarding: KAJAL/MD/TELEPHONE Contact: [phone] General Message/Vendor Calls Caller's first and last name: Adria Serarno. 
 
 
Reason for call: Status of refill request for Concerta ER 27 mg 
 
 
Callback required yes/no and why: Yes Best contact number(s): [phone] Details to clarify the request: Aidan Noble from [address] [place] [address] calling for the status of a refill request for Concerta ER 27 mg. 
 
 
Joanna Cruz 145.3

## 2023-12-08 NOTE — DISCHARGE NOTE NURSING/CASE MANAGEMENT/SOCIAL WORK - NSDCPEFALRISK_GEN_ALL_CORE
For information on Fall & Injury Prevention, visit: [email or web address] OR  [email or web address] OR  https://www.cdc.gov/steadi/patient.html For information on Fall & Injury Prevention, visit: [email or web address] OR  [email or web address] OR  https://www.cdc.gov/steadi/patient.html

## 2023-12-08 NOTE — DIETITIAN INITIAL EVALUATION ADULT - PERTINENT MEDS FT
MEDICATIONS  (PRN):  ondansetron Injectable 4 milliGRAM(s) IV Push every 6 hours PRN Nausea and/or Vomiting  oxyCODONE    IR 5 milliGRAM(s) Oral every 4 hours PRN Severe Pain (7 - 10)

## 2023-12-10 ENCOUNTER — NON-APPOINTMENT (OUTPATIENT)
Age: 73
End: 2023-12-10

## 2023-12-15 ENCOUNTER — NON-APPOINTMENT (OUTPATIENT)
Age: 73
End: 2023-12-15

## 2023-12-21 ENCOUNTER — RESULT REVIEW (OUTPATIENT)
Age: 73
End: 2023-12-21

## 2023-12-21 ENCOUNTER — APPOINTMENT (OUTPATIENT)
Dept: SURGICAL ONCOLOGY | Facility: CLINIC | Age: 73
End: 2023-12-21
Payer: MEDICARE

## 2023-12-21 VITALS
DIASTOLIC BLOOD PRESSURE: 75 MMHG | WEIGHT: 140 LBS | BODY MASS INDEX: 22.5 KG/M2 | SYSTOLIC BLOOD PRESSURE: 137 MMHG | HEART RATE: 99 BPM | TEMPERATURE: 99.2 F | HEIGHT: 66 IN | OXYGEN SATURATION: 99 %

## 2023-12-21 LAB
SURGICAL PATHOLOGY STUDY: SIGNIFICANT CHANGE UP
SURGICAL PATHOLOGY STUDY: SIGNIFICANT CHANGE UP

## 2023-12-21 PROCEDURE — 99212 OFFICE O/P EST SF 10 MIN: CPT

## 2023-12-21 NOTE — REASON FOR VISIT
[Post-Op] : a post-op for [Family Member] : family member [FreeTextEntry2] : s/p robotic partial gastrectomy, here for follow up

## 2023-12-21 NOTE — PHYSICAL EXAM
[Normal] : not distended, non tender, no masses, no hepatosplenomegaly [de-identified] : Incisions c/d/i

## 2023-12-21 NOTE — RESULTS/DATA
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  Negative surgical resection margins.  Proliferative marker, Ki-67, is very low, less than 1%. Verified by: Mirza Robbins MD (Electronic Signature) Reported on: 12/21/23 16:14 [facility], [address] [place] [id number] _________________________________________________________________   Comment Case reviewed intradepartmentally  Synoptic Summary 1: GIST - Resection Clinical Associated Syndrome:   Not specified Specimen Procedure:  Local excision Tumor Tumor Focality:   Unifocal Tumor Site:  Stomach - NOT SPECIFIED Histologic Type:   Gastrointestinal stromal tumor, spindle cell type Tumor Size:  7.5 Centimeters (cm) Mitotic Rate:   3 mitoses per 5 mm2 Histologic Grade:   G1, low grade Treatment Effect:   No known presurgical therapy Risk Assessment:   Low risk Margins Margin Status:   All margins negative for GIST Closest Margin(s) to GIST:   Cannot be determined - UNORIENTED, 0.3 Distance from GIST to Closest Margin:    0.3 cm Regional Lymph Nodes Regional Lymph Node Status:   Not applicable (no regional lymph nodes submitted or found) pTNM Classification (AJCC 8th Edition) pT Category:   pT3 pN Category:   pN not assigned (no nodes submitted or found)

## 2023-12-21 NOTE — HISTORY OF PRESENT ILLNESS
[de-identified] : Patient underwent robotic assisted partial gastrectomy on 12/6/23. Here for follow up. 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 Patient otherwise doing well with some constipation, but tolerating a diet, having bowel function.

## 2023-12-21 NOTE — CONSULT LETTER
[Dear  ___] : Dear  [unfilled], [Consult Letter:] : I had the pleasure of evaluating your patient, [unfilled]. [Please see my note below.] : Please see my note below. [Consult Closing:] : Thank you very much for allowing me to participate in the care of this patient.  If you have any questions, please do not hesitate to contact me. [FreeTextEntry3] : Moe Chong MD Division of Surgical Oncology [facility] Phone: [phone] Fax: [phone]

## 2023-12-21 NOTE — ASSESSMENT
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 Here for follow up, doing well. Path with a 7.5cm GIST, low mitotic rate, negative margins.  - Overall low risk GIST with metastatic rate around 3% based on Miettinen score, but will discuss with Dr. Durham the possible need for adjuvant imatinib. - surveillance imaging every 3-6 months for 5 years, then annually after. - follow up with me in March, CT A/P at that time.  Moe Chong MD Division of Surgical Oncology [facility] Phone: [phone] Fax: [phone]  I spent 30 minutes reviewing the patient's chart, labs, imaging, interviewing and examining patient, and discussing plan of care with the patient, resident/PA team, and other providers, excluding separately billable procedures and teaching time.

## 2023-12-28 ENCOUNTER — RESULT REVIEW (OUTPATIENT)
Age: 73
End: 2023-12-28

## 2024-01-02 ENCOUNTER — NON-APPOINTMENT (OUTPATIENT)
Age: 74
End: 2024-01-02

## 2024-01-18 ENCOUNTER — OUTPATIENT (OUTPATIENT)
Dept: OUTPATIENT SERVICES | Facility: HOSPITAL | Age: 74
LOS: 1 days | Discharge: HOME | End: 2024-01-18

## 2024-01-18 DIAGNOSIS — C49.A0 GASTROINTESTINAL STROMAL TUMOR, UNSPECIFIED SITE: ICD-10-CM

## 2024-01-18 DIAGNOSIS — Z90.49 ACQUIRED ABSENCE OF OTHER SPECIFIED PARTS OF DIGESTIVE TRACT: Chronic | ICD-10-CM

## 2024-01-19 ENCOUNTER — APPOINTMENT (OUTPATIENT)
Dept: HEMATOLOGY ONCOLOGY | Facility: CLINIC | Age: 74
End: 2024-01-19
Payer: MEDICARE

## 2024-01-19 ENCOUNTER — NON-APPOINTMENT (OUTPATIENT)
Age: 74
End: 2024-01-19

## 2024-01-19 VITALS
OXYGEN SATURATION: 99 % | SYSTOLIC BLOOD PRESSURE: 137 MMHG | RESPIRATION RATE: 18 BRPM | DIASTOLIC BLOOD PRESSURE: 71 MMHG | WEIGHT: 138.25 LBS | HEIGHT: 64.45 IN | BODY MASS INDEX: 23.32 KG/M2 | TEMPERATURE: 97.5 F | HEART RATE: 92 BPM

## 2024-01-19 DIAGNOSIS — Z80.6 FAMILY HISTORY OF LEUKEMIA: ICD-10-CM

## 2024-01-19 DIAGNOSIS — Z87.891 PERSONAL HISTORY OF NICOTINE DEPENDENCE: ICD-10-CM

## 2024-01-19 DIAGNOSIS — Z80.8 FAMILY HISTORY OF MALIGNANT NEOPLASM OF OTHER ORGANS OR SYSTEMS: ICD-10-CM

## 2024-01-19 PROCEDURE — 99205 OFFICE O/P NEW HI 60 MIN: CPT

## 2024-01-19 RX ORDER — SODIUM,POTASSIUM PHOSPHATES 280-250MG
280-160-250 POWDER IN PACKET (EA) ORAL 4 TIMES DAILY
Qty: 4 | Refills: 0 | Status: COMPLETED | COMMUNITY
Start: 2023-11-29 | End: 2024-01-19

## 2024-01-21 PROBLEM — Z87.891 FORMER SMOKER: Status: ACTIVE | Noted: 2024-01-21

## 2024-01-21 PROBLEM — Z80.8 FAMILY HISTORY OF MALIGNANT NEOPLASM OF BONE: Status: ACTIVE | Noted: 2024-01-21

## 2024-01-21 PROBLEM — Z80.6 FAMILY HISTORY OF LEUKEMIA: Status: ACTIVE | Noted: 2024-01-21

## 2024-01-21 RX ORDER — GLUCOSA SU 2KCL/CHONDROITIN SU 500-400 MG
500 CAPSULE ORAL DAILY
Refills: 0 | Status: ACTIVE | COMMUNITY
Start: 2024-01-21

## 2024-01-21 RX ORDER — MAGNESIUM OXIDE 241.3 MG/1000MG
400 TABLET ORAL TWICE DAILY
Qty: 2 | Refills: 0 | Status: COMPLETED | COMMUNITY
Start: 2023-11-29 | End: 2024-01-21

## 2024-01-21 RX ORDER — GLUCOSAMINE HCL/CHONDROITIN SU 500-400 MG
3 CAPSULE ORAL
Refills: 0 | Status: ACTIVE | COMMUNITY
Start: 2024-01-21

## 2024-01-21 RX ORDER — ACYCLOVIR 400 MG/1
400 TABLET ORAL DAILY
Refills: 0 | Status: ACTIVE | COMMUNITY
Start: 2024-01-21

## 2024-01-21 RX ORDER — FOLIC ACID 1 MG/1
1 TABLET ORAL
Qty: 30 | Refills: 5 | Status: ACTIVE | COMMUNITY
Start: 2024-01-21

## 2024-01-21 RX ORDER — LISINOPRIL AND HYDROCHLOROTHIAZIDE TABLETS 10; 12.5 MG/1; MG/1
10-12.5 TABLET ORAL
Qty: 90 | Refills: 1 | Status: COMPLETED | COMMUNITY
Start: 2022-01-20 | End: 2024-01-21

## 2024-01-21 RX ORDER — PANTOPRAZOLE 40 MG/1
40 TABLET, DELAYED RELEASE ORAL
Qty: 60 | Refills: 2 | Status: ACTIVE | COMMUNITY
Start: 2024-01-21

## 2024-01-22 NOTE — HISTORY OF PRESENT ILLNESS
[Disease: _____________________] : Disease: [unfilled] [T: ___] : T[unfilled] [N: ___] : N[unfilled] [M: ___] : M[unfilled] [de-identified] : In September 2023 at age 72 y/o with known PMHx of HTN and BPH presented to [facility] on 9/17/23 c/o generalized fatigue, weight loss and anorexia x 1 month.  In the ED the patient was found to have a Hgb of 8.9 and BUN/Cr of 64/5.7.  A CT C/A/P was performed and noted an exophytic lesion arising from the gastric fundus with interval increase in size compared to a CT from 8/16/2021.  There was also an age indeterminant compression deformity of the T8 vertebrae.  A Head CT was performed and was negative for acute findings.  The patient was admitted and underwent an EGD inpatient that noted a large submucosal in the gastric cardia that was 36 cm from the incisors. A serum protein electrophoresis test demonstrated Bence Ruelas proteins with elevated light chains.  A bone marrow biopsy performed on 9/21/23 was consistent with multiple myeloma.  The patient's renal function improved with IV hydration, he was transfused 1 U PRBC's and was discharged home.  He established care with Hematologist Dr. Hon Migel Peacock ([facility]) and was started on Darzalex (anti-CD38 therapy).  He then transferred care to Dr. Mindi Durham at [facility] and is now on Darzalex/Velcade weekly with intent to start Revlimid soon.  On 10/10/23 he underwent an EGD/EUS with Dr. Breonna Lombardi that found a bilobed well circumscribed heterogeneous masses arising from the 4th layer of the gastric wall. The lobes measured 4.6 x 4.2 cm and 3.7 x 3.4 cm.  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  The patient established care with surgical oncologist, Dr. Moe Chong, on 10/24/23 and on 12/6/23 he underwent a partial gastrectomy at [facility].  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  The patient presented on 1/19/24 to establish oncologic care.   [de-identified] : 7.5 x 5.5 x 4.3 cm negative margins 4 mitoses per 5 mm2 5-10% necrosis present  IHC: , DOG1 and CD34 positive; desmin negative  KIT [id number] (exon 11)

## 2024-01-22 NOTE — REASON FOR VISIT
[Initial Consultation] : an initial consultation [Family Member] : family member [FreeTextEntry2] : GIST

## 2024-01-22 NOTE — PHYSICAL EXAM
[Restricted in physically strenuous activity but ambulatory and able to carry out work of a light or sedentary nature] : Status 1- Restricted in physically strenuous activity but ambulatory and able to carry out work of a light or sedentary nature, e.g., light house work, office work [Normal] : affect appropriate [de-identified] : elderly , appears weak  [de-identified] : anicteric  [de-identified] : reg rate  [de-identified] : normal respiratory effort, no audible wheeze [de-identified] : well healed incisions, area of erythema in RLQ

## 2024-01-22 NOTE — RESULTS/DATA
[FreeTextEntry1] : XAM: CT RENAL STONE HUNT   PROCEDURE DATE: 08/16/2021    INTERPRETATION: CLINICAL INFORMATION: Left groin pain and difficulty urinating  COMPARISON: None.  CONTRAST/COMPLICATIONS: IV Contrast: NONE Oral Contrast: NONE Complications: None reported at time of study completion  PROCEDURE: CT of the Abdomen and Pelvis was performed. Sagittal and coronal reformats were performed.  FINDINGS: LOWER CHEST: Within normal limits.  LIVER: Within normal limits. BILE DUCTS: Normal caliber. GALLBLADDER: Within normal limits. SPLEEN: Within normal limits. PANCREAS: Within normal limits. ADRENALS: Within normal limits. KIDNEYS/URETERS: Left hydronephrosis and perinephric stranding and hydroureter secondary to a 5 mm obstructing stone at the distal left ureter. Normal right kidney.  BLADDER: Within normal limits. REPRODUCTIVE ORGANS: Prostate is enlarged.  BOWEL: No bowel obstruction. Appendix is not visualized. No evidence of inflammation in the pericecal region. Diverticulosis without diverticulitis PERITONEUM: No ascites. VESSELS: Within normal limits. RETROPERITONEUM/LYMPH NODES: No lymphadenopathy. ABDOMINAL WALL: Within normal limits. BONES: Negative changes at the lower lumbar spine. Grade 1 anterolisthesis of L5 on S1 with bilateral spondylolysis.  IMPRESSION:  Left hydronephrosis and hydroureter secondary to a 5 mm obstructing stone at the distal left ureter. __________ ACC: [id number] EXAM: CT BRAIN ORDERED BY: LOGAN JONES  PROCEDURE DATE: 09/17/2023    INTERPRETATION: CLINICAL STATEMENT: Dizziness 1 month.  TECHNIQUE: Noncontrast CT of the brain was performed. Beam hardening artifact from the petrous pyramid limits evaluation of the brainstem and posterior fossa. KV and MA adjusted according to patient's body habitus. Sagittal and coronal reformatted images provided. COMPARISON: No similar prior studies for comparison.  FINDINGS:  Mucosal thickening of the visualized paranasal sinuses. No air-fluid levels visualized. Visualized mastoid air cells and middle ear regions well-aerated. Probable cerumen left external auditory canal. No aggressive calvarial lesion.  Mild parenchymal atrophy, eccentric in bifrontal regions. Patchy hypoattenuation periventricular and juxtacortical white matter bilateral cerebral hemispheres; a nonspecific finding which statistically reflects chronic microvascular ischemic change.  No evidence of extra-axial collection, intracranial hemorrhage, mass or mass effect. Gray-white matter differentiation appears preserved.  IMPRESSION: 1. No evidence of acute territorial infarction, hemorrhage or mass effect. 2. Findings most concordant with chronic microvascular ischemic change. 3. Additional findings described in detail above.  If clinical symptoms persist consider further imaging with MRI, provided there are no medical contraindications.  --- End of Report ---  ACC: [id number] EXAM: CT CHEST IC ORDERED BY: LOGAN JONES  ACC: [id number] EXAM: CT ABDOMEN AND PELVIS IC ORDERED BY: LOGAN JONES  PROCEDURE DATE: 09/17/2023    INTERPRETATION: CLINICAL INFORMATION: Fatigue and anorexia for one month. Weight loss. Anemia.  COMPARISON: CT 8/16/2021  CONTRAST/COMPLICATIONS: IV Contrast: Omnipaque 350 (accession [id number]), IV contrast documented in unlinked concurrent exam (accession [id number]) 90 cc administered 10 cc discarded Oral Contrast: NONE Complications: None reported at time of study completion  PROCEDURE: CT of the Chest, Abdomen and Pelvis was performed. Sagittal and coronal reformats were performed.  FINDINGS: CHEST: LUNGS AND LARGE AIRWAYS: Patent central airways. No pulmonary nodules or parenchymal consolidation. PLEURA: No pleural effusion. VESSELS: Within normal limits. HEART: Heart size is normal. No pericardial effusion. MEDIASTINUM AND TOMÁS: No lymphadenopathy. CHEST WALL AND LOWER NECK: Within normal limits.  ABDOMEN AND PELVIS: LIVER: Normal size and morphology. Small hypoattenuating lesion at the gallbladder fossa, incompletely characterized. BILE DUCTS: Normal caliber. GALLBLADDER: Within normal limits. SPLEEN: Within normal limits. PANCREAS: Within normal limits. ADRENALS: Within normal limits. KIDNEYS/URETERS: Hypoattenuating left renal lesions are stable compared with the prior CT 8/16/2021, likely benign cysts. No calculus or hydronephrosis. Symmetric renal enhance  BLADDER: Within normal limits. REPRODUCTIVE ORGANS: Prostate within normal limits.  BOWEL: No bowel obstruction. Appendix is not visualized. No evidence of inflammation in the pericecal region. There is an exophytic lesion arising from the gastric fundus measuring 4.1 x 5.3 x 5.2 cm (AP x TR x CC), increased in size from CT 8/16/2021 where it measured 3.0 x 3.4 x 3.3 cm. PERITONEUM: No ascites. VESSELS: Within normal limits. Retroaortic left renal vein. RETROPERITONEUM/LYMPH NODES: No lymphadenopathy. ABDOMINAL WALL: Within normal limits. BONES: Bones are osteopenic with degenerative changes throughout the thoracolumbar spine. Age indeterminate compression fracture deformity of T8 vertebral body.  IMPRESSION: Exophytic lesion arising from the gastric fundus with interval increase in size since CT 8/16/2021. This may represent a solid mass such as a GIST. Differential includes gastric diverticulum, which is considered less likely. Consider CT with oral contrast for further evaluation.  Age indeterminate compression fracture deformity involving the T8 vertebral body. Correlate for point tenderness.  --- End of Report ---   ACC: [id number] EXAM: XR BONE SURVEY LIMITED ORDERED BY: HON EMANUEL BILLINGS  PROCEDURE DATE: 09/21/2023    INTERPRETATION: Clinical history: 73-year-old male, evaluate for multiple myeloma, Bence-Ruelas proteins.  Two views of the skull, 2 views of the cervical, thoracic/lumbar spine frontal view of the left/right humerus, pelvis, left/right femur and leg and frontal view of the chest. Correlation is made with the CT of 9/17/2023  Skull: Lateral view demonstrates lucencies measuring up to 0.5 cm in the calvaria suspicious for lytic lesions.  Mild to moderate degenerative change in the remaining, visualized osseous structures with no lytic lesions or gross cortical destruction.  Grade 1 spondylolisthesis of L5 on S1 with moderate degenerative change in the facet joints.  Frontal view of the chest: Normal cardiac silhouette and normal pulmonary vasculature with no consolidation, effusion, pneumothorax or acute osseous finding.  Healed fracture of the right fifth rib proximal to the angle noted.  IMPRESSION:  Lucencies in the calvarium suspicious for lytic lesions.  Grade 1 spondylolisthesis of L5 on S1, unchanged  --- End of Report ---  EXAM: [id number] - MR SPINE LUMBAR - ORDERED BY: HON EMANUEL BILLINGS   PROCEDURE DATE: 10/13/2023    INTERPRETATION: CLINICAL INFORMATION: Lower back pain. Multiple myeloma  ADDITIONAL CLINICAL INFORMATION: Low back muscle strain S39.012A  TECHNIQUE: Multiplanar, multisequence MRI was performed of the lumbar spine.  PRIOR STUDIES: No priors available for comparison.  FINDINGS:  LOCALIZER: No additional findings. BONES: There is a heterogeneous pattern of the bone marrow signal in keeping with the known myeloma. No acute fracture is seen. Chronic mild fracture deformity of the T12 vertebra. ALIGNMENT: Straightening of the lumbar spine. Minimal anterolisthesis of L4 on L5. Mild anterolisthesis of L5 on S1. Varying levels of disc space narrowing most severe at L5-S1. SACROILIAC JOINTS/SACRUM: There is no sacral fracture. The SI joints are partially visualized but are intact. CONUS AND CAUDA EQUINA: The distal cord and conus are normal in signal. Conus terminates at L1. VISUALIZED INTRAPELVIC/INTRA-ABDOMINAL SOFT TISSUES: Normal. PARASPINAL SOFT TISSUES: Normal.   INDIVIDUAL LEVELS:  LOWER THORACIC SPINE: No spinal canal or neuroforaminal stenosis.  L1-L2: No spinal canal or neuroforaminal stenosis. L2-L3: No spinal canal or neuroforaminal stenosis. L3-L4: No spinal canal or neuroforaminal stenosis. L4-L5: Moderate facet arthrosis with moderate facet effusions. Minimal anterolisthesis. Mild disc bulging. Mild foraminal narrowing. No central canal or lateral recess narrowing. L5-S1: Moderate facet arthrosis. Mild anterolisthesis with uncovering the disc space. Spurring into the foramen. Moderate bilateral foraminal narrowing is worse on the left. No central canal or lateral recess narrowing.   IMPRESSION:  1. Multilevel spondylosis as detailed above. 2. Heterogeneous appearance of the bone marrow in keeping with myeloma. 3. Chronic mild compression fracture deformity of the T12 vertebra. No acute fracture.  --- End of Report ---

## 2024-01-26 ENCOUNTER — NON-APPOINTMENT (OUTPATIENT)
Age: 74
End: 2024-01-26

## 2024-03-22 ENCOUNTER — APPOINTMENT (OUTPATIENT)
Dept: CT IMAGING | Facility: CLINIC | Age: 74
End: 2024-03-22
Payer: MEDICARE

## 2024-03-22 PROCEDURE — 82565 ASSAY OF CREATININE: CPT | Mod: QW

## 2024-03-22 PROCEDURE — 74177 CT ABD & PELVIS W/CONTRAST: CPT

## 2024-03-27 ENCOUNTER — OUTPATIENT (OUTPATIENT)
Dept: OUTPATIENT SERVICES | Facility: HOSPITAL | Age: 74
LOS: 1 days | Discharge: HOME | End: 2024-03-27

## 2024-03-27 DIAGNOSIS — C49.A0 GASTROINTESTINAL STROMAL TUMOR, UNSPECIFIED SITE: ICD-10-CM

## 2024-03-27 DIAGNOSIS — Z90.49 ACQUIRED ABSENCE OF OTHER SPECIFIED PARTS OF DIGESTIVE TRACT: Chronic | ICD-10-CM

## 2024-03-28 ENCOUNTER — APPOINTMENT (OUTPATIENT)
Dept: SURGICAL ONCOLOGY | Facility: CLINIC | Age: 74
End: 2024-03-28
Payer: MEDICARE

## 2024-03-28 VITALS
BODY MASS INDEX: 24.12 KG/M2 | HEIGHT: 64.45 IN | OXYGEN SATURATION: 98 % | DIASTOLIC BLOOD PRESSURE: 74 MMHG | SYSTOLIC BLOOD PRESSURE: 151 MMHG | WEIGHT: 143 LBS | TEMPERATURE: 98 F | HEART RATE: 92 BPM

## 2024-03-28 PROCEDURE — 99214 OFFICE O/P EST MOD 30 MIN: CPT

## 2024-03-28 NOTE — HISTORY OF PRESENT ILLNESS
[de-identified] : Patient underwent robotic assisted partial gastrectomy on 12/6/23. Here for follow up. 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  Surveillance scans 3/22/24 showed DINORA. 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 Surveillance. COMPARISON: CT abdomen and pelvis 9/17/2023 CONTRAST/COMPLICATIONS: IV Contrast: Omnipaque 350  90 cc administered   10 cc discarded Oral Contrast: Water Complications: None reported at time of study completion PROCEDURE: CT of the Abdomen and Pelvis was performed. Arterial and Portal Venous phases were acquired. Sagittal and coronal reformats were performed.  FINDINGS: LOWER CHEST: Within normal limits. LIVER: Within normal limits. BILE DUCTS: Normal caliber. GALLBLADDER: Within normal limits. SPLEEN: Within normal limits. PANCREAS: Within normal limits. ADRENALS: Within normal limits. KIDNEYS/URETERS: Exophytic left renal cyst. No hydronephrosis. BLADDER: Within normal limits. REPRODUCTIVE ORGANS: Prostate is enlarged. BOWEL: Status post resection of previously seen exophytic gastric mass. Colonic diverticulosis. No bowel obstruction. Appendix is normal. PERITONEUM: No ascites. VESSELS: Retroaortic left renal vein, a normal variant. RETROPERITONEUM/LYMPH NODES: No lymphadenopathy. ABDOMINAL WALL: Within normal limits. BONES: Degenerative changes. Chronic compression deformity of the T12 vertebral body.  IMPRESSION: No evidence of recurrent or metastatic disease

## 2024-03-28 NOTE — ASSESSMENT
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 Here for follow up, doing well. Path with a 7.5cm GIST, low mitotic rate, negative margins. Fist set of surveillance scans with DINORA in 3/2024  - patient to continue surveillance for his GIST with Dr. Ortiz. Plan discussed with him.  Moe Chong MD  Assistant Professor of Surgery [employer] at [facility] Division of Surgical Oncology [facility] Phone: [phone] Fax: [phone]  I spent 30 minutes reviewing the patient's chart, labs, imaging, interviewing and examining patient, and discussing plan of care with the patient, resident/PA team, and other providers, excluding separately billable procedures and teaching time.

## 2024-03-28 NOTE — REASON FOR VISIT
[Follow-Up Visit] : a follow-up visit for [Family Member] : family member [FreeTextEntry2] : GIST s/p resection

## 2024-03-28 NOTE — PHYSICAL EXAM
[Normal] : not distended, non tender, no masses, no hepatosplenomegaly [de-identified] : RRR [de-identified] : Incisions slightly red but c/d/i

## 2024-04-05 ENCOUNTER — APPOINTMENT (OUTPATIENT)
Dept: HEMATOLOGY ONCOLOGY | Facility: CLINIC | Age: 74
End: 2024-04-05
Payer: MEDICARE

## 2024-04-05 VITALS
WEIGHT: 145.28 LBS | OXYGEN SATURATION: 99 % | RESPIRATION RATE: 17 BRPM | BODY MASS INDEX: 25.11 KG/M2 | DIASTOLIC BLOOD PRESSURE: 69 MMHG | SYSTOLIC BLOOD PRESSURE: 146 MMHG | HEIGHT: 63.66 IN | TEMPERATURE: 97 F | HEART RATE: 86 BPM

## 2024-04-05 DIAGNOSIS — C49.A2 GASTROINTESTINAL STROMAL TUMOR OF STOMACH: ICD-10-CM

## 2024-04-05 PROCEDURE — 99213 OFFICE O/P EST LOW 20 MIN: CPT

## 2024-04-05 PROCEDURE — G2211 COMPLEX E/M VISIT ADD ON: CPT

## 2024-04-05 NOTE — PHYSICAL EXAM
[Restricted in physically strenuous activity but ambulatory and able to carry out work of a light or sedentary nature] : Status 1- Restricted in physically strenuous activity but ambulatory and able to carry out work of a light or sedentary nature, e.g., light house work, office work [Normal] : affect appropriate [de-identified] : well healed incisions, area of erythema in RLQ  [Ambulatory and capable of all self care but unable to carry out any work activities] : Status 2- Ambulatory and capable of all self care but unable to carry out any work activities. Up and about more than 50% of waking hours [de-identified] : elderly , in wheelchair  [de-identified] : anicteric  [de-identified] : normal respiratory effort, no audible wheeze [de-identified] : reg rate

## 2024-04-05 NOTE — HISTORY OF PRESENT ILLNESS
[Disease: _____________________] : Disease: [unfilled] [T: ___] : T[unfilled] [N: ___] : N[unfilled] [M: ___] : M[unfilled] [de-identified] : In September 2023 at age 72 y/o with known PMHx of HTN and BPH presented to [facility] on 9/17/23 c/o generalized fatigue, weight loss and anorexia x 1 month.  In the ED the patient was found to have a Hgb of 8.9 and BUN/Cr of 64/5.7.  A CT C/A/P was performed and noted an exophytic lesion arising from the gastric fundus with interval increase in size compared to a CT from 8/16/2021.  There was also an age indeterminant compression deformity of the T8 vertebrae.  A Head CT was performed and was negative for acute findings.  The patient was admitted and underwent an EGD inpatient that noted a large submucosal in the gastric cardia that was 36 cm from the incisors. A serum protein electrophoresis test demonstrated Bence Ruelas proteins with elevated light chains.  A bone marrow biopsy performed on 9/21/23 was consistent with multiple myeloma.  The patient's renal function improved with IV hydration, he was transfused 1 U PRBC's and was discharged home.  He established care with Hematologist Dr. Hon Migel Peacock ([facility]) and was started on Darzalex (anti-CD38 therapy).  He then transferred care to Dr. Mindi Durham at [facility] and is now on Darzalex/Velcade weekly with intent to start Revlimid soon.  On 10/10/23 he underwent an EGD/EUS with Dr. Breonna Lombardi that found a bilobed well circumscribed heterogeneous masses arising from the 4th layer of the gastric wall. The lobes measured 4.6 x 4.2 cm and 3.7 x 3.4 cm.  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  The patient established care with surgical oncologist, Dr. Moe hCong, on 10/24/23 and on 12/6/23 he underwent a partial gastrectomy at [facility].  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  The patient presented on 1/19/24 to establish oncologic care.   [de-identified] : 7.5 x 5.5 x 4.3 cm negative margins 4 mitoses per 5 mm2 5-10% necrosis present  IHC: , DOG1 and CD34 positive; desmin negative  KIT [id number] (exon 11) [de-identified] : presents in follow up  no acute complaints  here to review imaging

## 2024-05-30 NOTE — H&P PST ADULT - ANESTHESIA, PREVIOUS REACTION, PROFILE
CC:  Davidson Wills none

## 2024-08-13 NOTE — RESULTS/DATA
Liseth Quinn
[FreeTextEntry1] : XAM: CT RENAL STONE HUNT   PROCEDURE DATE: 08/16/2021    INTERPRETATION: CLINICAL INFORMATION: Left groin pain and difficulty urinating  COMPARISON: None.  CONTRAST/COMPLICATIONS: IV Contrast: NONE Oral Contrast: NONE Complications: None reported at time of study completion  PROCEDURE: CT of the Abdomen and Pelvis was performed. Sagittal and coronal reformats were performed.  FINDINGS: LOWER CHEST: Within normal limits.  LIVER: Within normal limits. BILE DUCTS: Normal caliber. GALLBLADDER: Within normal limits. SPLEEN: Within normal limits. PANCREAS: Within normal limits. ADRENALS: Within normal limits. KIDNEYS/URETERS: Left hydronephrosis and perinephric stranding and hydroureter secondary to a 5 mm obstructing stone at the distal left ureter. Normal right kidney.  BLADDER: Within normal limits. REPRODUCTIVE ORGANS: Prostate is enlarged.  BOWEL: No bowel obstruction. Appendix is not visualized. No evidence of inflammation in the pericecal region. Diverticulosis without diverticulitis PERITONEUM: No ascites. VESSELS: Within normal limits. RETROPERITONEUM/LYMPH NODES: No lymphadenopathy. ABDOMINAL WALL: Within normal limits. BONES: Negative changes at the lower lumbar spine. Grade 1 anterolisthesis of L5 on S1 with bilateral spondylolysis.  IMPRESSION:  Left hydronephrosis and hydroureter secondary to a 5 mm obstructing stone at the distal left ureter. __________ ACC: [id number] EXAM: CT BRAIN ORDERED BY: LOGAN JONES  PROCEDURE DATE: 09/17/2023    INTERPRETATION: CLINICAL STATEMENT: Dizziness 1 month.  TECHNIQUE: Noncontrast CT of the brain was performed. Beam hardening artifact from the petrous pyramid limits evaluation of the brainstem and posterior fossa. KV and MA adjusted according to patient's body habitus. Sagittal and coronal reformatted images provided. COMPARISON: No similar prior studies for comparison.  FINDINGS:  Mucosal thickening of the visualized paranasal sinuses. No air-fluid levels visualized. Visualized mastoid air cells and middle ear regions well-aerated. Probable cerumen left external auditory canal. No aggressive calvarial lesion.  Mild parenchymal atrophy, eccentric in bifrontal regions. Patchy hypoattenuation periventricular and juxtacortical white matter bilateral cerebral hemispheres; a nonspecific finding which statistically reflects chronic microvascular ischemic change.  No evidence of extra-axial collection, intracranial hemorrhage, mass or mass effect. Gray-white matter differentiation appears preserved.  IMPRESSION: 1. No evidence of acute territorial infarction, hemorrhage or mass effect. 2. Findings most concordant with chronic microvascular ischemic change. 3. Additional findings described in detail above.  If clinical symptoms persist consider further imaging with MRI, provided there are no medical contraindications.  --- End of Report ---  ACC: [id number] EXAM: CT CHEST IC ORDERED BY: LOGAN JONES  ACC: [id number] EXAM: CT ABDOMEN AND PELVIS IC ORDERED BY: LOGAN JONES  PROCEDURE DATE: 09/17/2023    INTERPRETATION: CLINICAL INFORMATION: Fatigue and anorexia for one month. Weight loss. Anemia.  COMPARISON: CT 8/16/2021  CONTRAST/COMPLICATIONS: IV Contrast: Omnipaque 350 (accession [id number]), IV contrast documented in unlinked concurrent exam (accession [id number]) 90 cc administered 10 cc discarded Oral Contrast: NONE Complications: None reported at time of study completion  PROCEDURE: CT of the Chest, Abdomen and Pelvis was performed. Sagittal and coronal reformats were performed.  FINDINGS: CHEST: LUNGS AND LARGE AIRWAYS: Patent central airways. No pulmonary nodules or parenchymal consolidation. PLEURA: No pleural effusion. VESSELS: Within normal limits. HEART: Heart size is normal. No pericardial effusion. MEDIASTINUM AND TOMÁS: No lymphadenopathy. CHEST WALL AND LOWER NECK: Within normal limits.  ABDOMEN AND PELVIS: LIVER: Normal size and morphology. Small hypoattenuating lesion at the gallbladder fossa, incompletely characterized. BILE DUCTS: Normal caliber. GALLBLADDER: Within normal limits. SPLEEN: Within normal limits. PANCREAS: Within normal limits. ADRENALS: Within normal limits. KIDNEYS/URETERS: Hypoattenuating left renal lesions are stable compared with the prior CT 8/16/2021, likely benign cysts. No calculus or hydronephrosis. Symmetric renal enhance  BLADDER: Within normal limits. REPRODUCTIVE ORGANS: Prostate within normal limits.  BOWEL: No bowel obstruction. Appendix is not visualized. No evidence of inflammation in the pericecal region. There is an exophytic lesion arising from the gastric fundus measuring 4.1 x 5.3 x 5.2 cm (AP x TR x CC), increased in size from CT 8/16/2021 where it measured 3.0 x 3.4 x 3.3 cm. PERITONEUM: No ascites. VESSELS: Within normal limits. Retroaortic left renal vein. RETROPERITONEUM/LYMPH NODES: No lymphadenopathy. ABDOMINAL WALL: Within normal limits. BONES: Bones are osteopenic with degenerative changes throughout the thoracolumbar spine. Age indeterminate compression fracture deformity of T8 vertebral body.  IMPRESSION: Exophytic lesion arising from the gastric fundus with interval increase in size since CT 8/16/2021. This may represent a solid mass such as a GIST. Differential includes gastric diverticulum, which is considered less likely. Consider CT with oral contrast for further evaluation.  Age indeterminate compression fracture deformity involving the T8 vertebral body. Correlate for point tenderness.  --- End of Report ---   ACC: [id number] EXAM: XR BONE SURVEY LIMITED ORDERED BY: HON EMANUEL BILLINGS  PROCEDURE DATE: 09/21/2023    INTERPRETATION: Clinical history: 73-year-old male, evaluate for multiple myeloma, Bence-Ruelas proteins.  Two views of the skull, 2 views of the cervical, thoracic/lumbar spine frontal view of the left/right humerus, pelvis, left/right femur and leg and frontal view of the chest. Correlation is made with the CT of 9/17/2023  Skull: Lateral view demonstrates lucencies measuring up to 0.5 cm in the calvaria suspicious for lytic lesions.  Mild to moderate degenerative change in the remaining, visualized osseous structures with no lytic lesions or gross cortical destruction.  Grade 1 spondylolisthesis of L5 on S1 with moderate degenerative change in the facet joints.  Frontal view of the chest: Normal cardiac silhouette and normal pulmonary vasculature with no consolidation, effusion, pneumothorax or acute osseous finding.  Healed fracture of the right fifth rib proximal to the angle noted.  IMPRESSION:  Lucencies in the calvarium suspicious for lytic lesions.  Grade 1 spondylolisthesis of L5 on S1, unchanged  --- End of Report ---  EXAM: [id number] - MR SPINE LUMBAR - ORDERED BY: HON EMANUEL BILLINGS   PROCEDURE DATE: 10/13/2023    INTERPRETATION: CLINICAL INFORMATION: Lower back pain. Multiple myeloma  ADDITIONAL CLINICAL INFORMATION: Low back muscle strain S39.012A  TECHNIQUE: Multiplanar, multisequence MRI was performed of the lumbar spine.  PRIOR STUDIES: No priors available for comparison.  FINDINGS:  LOCALIZER: No additional findings. BONES: There is a heterogeneous pattern of the bone marrow signal in keeping with the known myeloma. No acute fracture is seen. Chronic mild fracture deformity of the T12 vertebra. ALIGNMENT: Straightening of the lumbar spine. Minimal anterolisthesis of L4 on L5. Mild anterolisthesis of L5 on S1. Varying levels of disc space narrowing most severe at L5-S1. SACROILIAC JOINTS/SACRUM: There is no sacral fracture. The SI joints are partially visualized but are intact. CONUS AND CAUDA EQUINA: The distal cord and conus are normal in signal. Conus terminates at L1. VISUALIZED INTRAPELVIC/INTRA-ABDOMINAL SOFT TISSUES: Normal. PARASPINAL SOFT TISSUES: Normal.   INDIVIDUAL LEVELS:  LOWER THORACIC SPINE: No spinal canal or neuroforaminal stenosis.  L1-L2: No spinal canal or neuroforaminal stenosis. L2-L3: No spinal canal or neuroforaminal stenosis. L3-L4: No spinal canal or neuroforaminal stenosis. L4-L5: Moderate facet arthrosis with moderate facet effusions. Minimal anterolisthesis. Mild disc bulging. Mild foraminal narrowing. No central canal or lateral recess narrowing. L5-S1: Moderate facet arthrosis. Mild anterolisthesis with uncovering the disc space. Spurring into the foramen. Moderate bilateral foraminal narrowing is worse on the left. No central canal or lateral recess narrowing.   IMPRESSION:  1. Multilevel spondylosis as detailed above. 2. Heterogeneous appearance of the bone marrow in keeping with myeloma. 3. Chronic mild compression fracture deformity of the T12 vertebra. No acute fracture.  --- End of Report ---

## 2024-09-06 NOTE — H&P PST ADULT - ENMT
Diet, NPO with Tube Feed:   Tube Feeding Modality: Nasogastric  Glucerna 1.2 Gabriele (GLUCERNARTH)  Total Volume for 24 Hours (mL): 1440  Continuous  Starting Tube Feed Rate {mL per Hour}: 20  Increase Tube Feed Rate by (mL): 10     Every 6 hours  Until Goal Tube Feed Rate (mL per Hour): 60  Tube Feed Duration (in Hours): 24  Tube Feed Start Time: 15:30 (08-19-24 @ 15:03) [Active]       negative Diet, NPO with Tube Feed:   Tube Feeding Modality: Nasogastric  Glucerna 1.2 Gabriele (GLUCERNARTH)  Total Volume for 24 Hours (mL): 1440  Continuous  Starting Tube Feed Rate {mL per Hour}: 20  Increase Tube Feed Rate by (mL): 10     Every 6 hours  Until Goal Tube Feed Rate (mL per Hour): 60  Tube Feed Duration (in Hours): 24  Tube Feed Start Time: 13:00 (09-05-24 @ 12:08) [Active]       mouth/pharynx/neck

## 2024-09-24 ENCOUNTER — OUTPATIENT (OUTPATIENT)
Dept: OUTPATIENT SERVICES | Facility: HOSPITAL | Age: 74
LOS: 1 days | Discharge: HOME | End: 2024-09-24

## 2024-09-24 DIAGNOSIS — C49.A0 GASTROINTESTINAL STROMAL TUMOR, UNSPECIFIED SITE: ICD-10-CM

## 2024-09-24 DIAGNOSIS — Z90.49 ACQUIRED ABSENCE OF OTHER SPECIFIED PARTS OF DIGESTIVE TRACT: Chronic | ICD-10-CM

## 2024-10-01 ENCOUNTER — OUTPATIENT (OUTPATIENT)
Dept: OUTPATIENT SERVICES | Facility: HOSPITAL | Age: 74
LOS: 1 days | End: 2024-10-01
Payer: MEDICARE

## 2024-10-01 ENCOUNTER — APPOINTMENT (OUTPATIENT)
Dept: CT IMAGING | Facility: CLINIC | Age: 74
End: 2024-10-01
Payer: MEDICARE

## 2024-10-01 DIAGNOSIS — Z90.49 ACQUIRED ABSENCE OF OTHER SPECIFIED PARTS OF DIGESTIVE TRACT: Chronic | ICD-10-CM

## 2024-10-01 DIAGNOSIS — C49.A2 GASTROINTESTINAL STROMAL TUMOR OF STOMACH: ICD-10-CM

## 2024-10-01 PROCEDURE — 74177 CT ABD & PELVIS W/CONTRAST: CPT | Mod: 26,MH

## 2024-10-04 ENCOUNTER — APPOINTMENT (OUTPATIENT)
Dept: HEMATOLOGY ONCOLOGY | Facility: CLINIC | Age: 74
End: 2024-10-04
Payer: MEDICARE

## 2024-10-04 DIAGNOSIS — C49.A2 GASTROINTESTINAL STROMAL TUMOR OF STOMACH: ICD-10-CM

## 2024-10-04 DIAGNOSIS — K31.9 DISEASE OF STOMACH AND DUODENUM, UNSPECIFIED: ICD-10-CM

## 2024-10-04 DIAGNOSIS — R07.9 CHEST PAIN, UNSPECIFIED: ICD-10-CM

## 2024-10-04 PROCEDURE — 99213 OFFICE O/P EST LOW 20 MIN: CPT

## 2024-10-04 PROCEDURE — G2211 COMPLEX E/M VISIT ADD ON: CPT

## 2024-10-04 NOTE — HISTORY OF PRESENT ILLNESS
[Disease: _____________________] : Disease: [unfilled] [T: ___] : T[unfilled] [N: ___] : N[unfilled] [M: ___] : M[unfilled] [de-identified] : In September 2023 at age 74 y/o with known PMHx of HTN and BPH presented to [facility] on 9/17/23 c/o generalized fatigue, weight loss and anorexia x 1 month.  In the ED the patient was found to have a Hgb of 8.9 and BUN/Cr of 64/5.7.  A CT C/A/P was performed and noted an exophytic lesion arising from the gastric fundus with interval increase in size compared to a CT from 8/16/2021.  There was also an age indeterminant compression deformity of the T8 vertebrae.  A Head CT was performed and was negative for acute findings.  The patient was admitted and underwent an EGD inpatient that noted a large submucosal in the gastric cardia that was 36 cm from the incisors. A serum protein electrophoresis test demonstrated Bence Ruelas proteins with elevated light chains.  A bone marrow biopsy performed on 9/21/23 was consistent with multiple myeloma.  The patient's renal function improved with IV hydration, he was transfused 1 U PRBC's and was discharged home.  He established care with Hematologist Dr. Hon Migel Peacock ([facility]) and was started on Darzalex (anti-CD38 therapy).  He then transferred care to Dr. Mindi Durham at [facility] and is now on Darzalex/Velcade weekly with intent to start Revlimid soon.  On 10/10/23 he underwent an EGD/EUS with Dr. Breonna Lombardi that found a bilobed well circumscribed heterogeneous masses arising from the 4th layer of the gastric wall. The lobes measured 4.6 x 4.2 cm and 3.7 x 3.4 cm.  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  The patient established care with surgical oncologist, Dr. Moe Chong, on 10/24/23 and on 12/6/23 he underwent a partial gastrectomy at [facility].  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  The patient presented on 1/19/24 to establish oncologic care.   [de-identified] : 7.5 x 5.5 x 4.3 cm negative margins 4 mitoses per 5 mm2 5-10% necrosis present  IHC: , DOG1 and CD34 positive; desmin negative  KIT [id number] (exon 11) [de-identified] : no acute complaints here to review imaging

## 2024-10-04 NOTE — HISTORY OF PRESENT ILLNESS
[Disease: _____________________] : Disease: [unfilled] [T: ___] : T[unfilled] [N: ___] : N[unfilled] [M: ___] : M[unfilled] [de-identified] : In September 2023 at age 72 y/o with known PMHx of HTN and BPH presented to [facility] on 9/17/23 c/o generalized fatigue, weight loss and anorexia x 1 month.  In the ED the patient was found to have a Hgb of 8.9 and BUN/Cr of 64/5.7.  A CT C/A/P was performed and noted an exophytic lesion arising from the gastric fundus with interval increase in size compared to a CT from 8/16/2021.  There was also an age indeterminant compression deformity of the T8 vertebrae.  A Head CT was performed and was negative for acute findings.  The patient was admitted and underwent an EGD inpatient that noted a large submucosal in the gastric cardia that was 36 cm from the incisors. A serum protein electrophoresis test demonstrated Bence Ruelas proteins with elevated light chains.  A bone marrow biopsy performed on 9/21/23 was consistent with multiple myeloma.  The patient's renal function improved with IV hydration, he was transfused 1 U PRBC's and was discharged home.  He established care with Hematologist Dr. Hon Migel Peacock ([facility]) and was started on Darzalex (anti-CD38 therapy).  He then transferred care to [place]. Mindi [place] [facility] and is now on Darzalex/Velcade weekly with intent to start Revlimid soon.  On 10/10/23 he underwent an EGD/EUS with Dr. Breonna Lombardi that found a bilobed well circumscribed heterogeneous masses arising from the 4th layer of the gastric wall. The lobes measured 4.6 x 4.2 cm and 3.7 x 3.4 cm.  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  The patient established care with surgical oncologist, Dr. Moe Chong, on 10/24/23 and on 12/6/23 he underwent a partial gastrectomy at [facility].  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  The patient presented on 1/19/24 to establish oncologic care.   [de-identified] : 7.5 x 5.5 x 4.3 cm negative margins 4 mitoses per 5 mm2 5-10% necrosis present  IHC: , DOG1 and CD34 positive; desmin negative  KIT [id number] (exon 11) [de-identified] : no acute complaints here to review imaging

## 2024-10-04 NOTE — PHYSICAL EXAM
[Ambulatory and capable of all self care but unable to carry out any work activities] : Status 2- Ambulatory and capable of all self care but unable to carry out any work activities. Up and about more than 50% of waking hours [Normal] : affect appropriate [de-identified] : elderly , in wheelchair  [de-identified] : anicteric  [de-identified] : normal respiratory effort, no audible wheeze [de-identified] : reg rate

## 2024-10-04 NOTE — PHYSICAL EXAM
[Ambulatory and capable of all self care but unable to carry out any work activities] : Status 2- Ambulatory and capable of all self care but unable to carry out any work activities. Up and about more than 50% of waking hours [Normal] : affect appropriate [de-identified] : elderly , in wheelchair  [de-identified] : anicteric  [de-identified] : normal respiratory effort, no audible wheeze [de-identified] : reg rate

## 2024-11-20 PROCEDURE — 74177 CT ABD & PELVIS W/CONTRAST: CPT

## 2024-11-20 NOTE — ED ADULT NURSE NOTE - ED CARDIAC RATE
Pt called to obtain MRI results. Provided results and emphasized brain health/heart health education regarding diet, exercise and sleep.  
normal

## 2025-03-04 NOTE — ED PROVIDER NOTE - ST/T WAVE
Daily Note     Today's date: 3/3/2025  Patient name: Saqib Byrnes  : 2013  MRN: [id number]  Referring provider: Smita Aguilar  Dx:   Encounter Diagnosis     ICD-10-CM    1. Congenital diplegia (HCC)  G80.8         Subjective: Saqib was seen with his Mom. He complained of being very tired during his session.   Objective:  ROM of hamstrings, MFR in supine to hamstrings and heelcords  Mat program w AAROM: ankle Dfx/Pfx, knee flexion/heel slides, hip abduction, SLR,prone knee flexion x 20 ea LE w min assist   Flex B/L  hip/knee to partially place feet on mat to perform bridges w PT holding them in place  Supermans in prone to lift one arm x 5 on ea side, then B/L UE x 10 x 2  NuStep resistance of 3, x 6 minutes- B/L UE & LE required occasional assist to keep RLE from falling into adduction  Total gym: supine knee extensions x 30 , supine lat pull downs x 12 w mod assist to extend elbows each time  Ambulation with dowels and vc to take longer steps with knees extended + occasional assist for balance +manual cues to limit hip abduction when fatigued  Practice ambulation across gym, turning and sitting using dowels  Sitting on total gym platform with feet supported to play bounce catch with playground ball x 25         Assessment:     Saqib returns today  in good spirits, moving a little slower than usual and complaining a lot of being tired and wanting a break or to rest and lay down.  Mom reports he has been having difficulty at home listening to basic instructions today .   He had difficulty with bridges initially but improved with practice, more consistent with pushing his hips into extension today. He continues to lack lumbar flexibility which makes prone press ups and bridges difficult. We worked more on foot proprioception and knowing where his feet where to be able to support him in sitting and standing position.  He continues to use trunk /hip flexion for stability but is working on  increasing extension of his hips to activate his trunk. He required more cueing at terminal knee extension to hold as he quickly returned to start. He is showing improvement with his balance with support but continues to rely heavily on his UE for support in order to extend his hips  but stood more upright when using dowels for ambulation than in previous sessions. He had difficulty with motor planning to turn and sit without holding onto equipment and using dowels for support/relying on Le stability and proprioception.   PT is unsure if he is tired from not sleeping well or due to s/p concussion.   Plan: Will continue PT 2x /week with ROM, strengthening, gait,  balance and coordination activities.            Testin minute walk test: 7 laps w vc to  keep moving at consistent speed and assist at turns initially as he wanted to stop 2025  8.5 laps 2025  Sit to stand x 1 minute: 19 in bench x 40 reps-lifting only his hips from bench, not extending to stand upright.                      lae

## 2025-04-09 ENCOUNTER — APPOINTMENT (OUTPATIENT)
Dept: HEMATOLOGY ONCOLOGY | Facility: CLINIC | Age: 75
End: 2025-04-09

## (undated) DEVICE — SUT PDS II 3-0 27" SH

## (undated) DEVICE — SOL IRR BAG H2O 1000ML

## (undated) DEVICE — STAPLER COVIDIEN ENDO GIA XL HANDLE

## (undated) DEVICE — GLV 8 PROTEXIS (WHITE)

## (undated) DEVICE — NDL ACQUIRE EUS FNB 22G

## (undated) DEVICE — SOL IRR NS 0.9% 250ML

## (undated) DEVICE — SOL IRR POUR H2O 1000ML

## (undated) DEVICE — SUCTION YANKAUER TAPERED BULBOUS NO VENT

## (undated) DEVICE — XI ARM NEEDLE DRIVER SUTURECUT MEGA 8MM

## (undated) DEVICE — STERIS DEFENDO 3-PIECE KIT (AIR/WATER, SUCTION & BIOPSY VALVES)

## (undated) DEVICE — PREP CHLORAPREP HI-LITE ORANGE 26ML

## (undated) DEVICE — BALLOON US ENDO

## (undated) DEVICE — BRUSH CYTO ENDO

## (undated) DEVICE — CATH IV SAFE BC 20G X 1.16" (PINK)

## (undated) DEVICE — SUT VLOC 180 3-0 9" GS-21 GREEN

## (undated) DEVICE — SENSOR O2 FINGER ADULT

## (undated) DEVICE — ENDOCATCH 10MM SPECIMEN POUCH

## (undated) DEVICE — CATH ELCTR GLIDE PRB 7FR

## (undated) DEVICE — DRSG CURITY GAUZE SPONGE 4 X 4" 12-PLY

## (undated) DEVICE — XI VESSEL SEALER

## (undated) DEVICE — LIGASURE BLUNT TIP 37CM

## (undated) DEVICE — SOL IRR POUR H2O 500ML

## (undated) DEVICE — LIGASURE MARYLAND 37CM

## (undated) DEVICE — WARMING BLANKET UPPER ADULT

## (undated) DEVICE — POSITIONER FOAM EGG CRATE ULNAR 2PCS (PINK)

## (undated) DEVICE — XI ARM FORCEP FENESTRATED BIPOLAR 8MM

## (undated) DEVICE — STAPLER SKIN PROXIMATE

## (undated) DEVICE — DRAPE TOWEL BLUE STICKY

## (undated) DEVICE — DRAPE XL SHEET 77X98"

## (undated) DEVICE — CLEANER FOR ELCTR TIP

## (undated) DEVICE — ENDOCATCH II 15MM

## (undated) DEVICE — CATH IV SAFE BC 22G X 1" (BLUE)

## (undated) DEVICE — PACK ROBOTIC

## (undated) DEVICE — DRAPE GENERAL ENDOSCOPY

## (undated) DEVICE — TUBE O2 SUPL CRUSH RESIS CONN SOUTHSIDE ONLY

## (undated) DEVICE — GLV 7.5 PROTEXIS (WHITE)

## (undated) DEVICE — XI ARM SCISSOR MONO CURVED

## (undated) DEVICE — DRAIN RESERVOIR FOR JACKSON PRATT 100CC CARDINAL

## (undated) DEVICE — DRSG DERMABOND 0.7ML

## (undated) DEVICE — SYR ALLIANCE II INFLATION 60ML

## (undated) DEVICE — SET IV PUMP BLOOD 1VALVE 180FILTER NON-DEHP

## (undated) DEVICE — XI ARM FORCEP MARYLAND BIPOLAR

## (undated) DEVICE — BRUSH COLONOSCOPY CYTOLOGY

## (undated) DEVICE — ELCTR GROUNDING PAD ADULT COVIDIEN

## (undated) DEVICE — SUT MONOCRYL 4-0 27" PS-2 UNDYED

## (undated) DEVICE — DRAIN PENROSE .25" X 12" SILICONE

## (undated) DEVICE — VENODYNE/SCD SLEEVE CALF MEDIUM

## (undated) DEVICE — SOL INJ NS 0.9% 500ML 2 PORT

## (undated) DEVICE — PACK IV START WITH CHG

## (undated) DEVICE — SYR IV POSIFLUSH NS 3ML 30/TY

## (undated) DEVICE — NDL INJ SCLERO INTERJECT 23G

## (undated) DEVICE — ELCTR BOVIE PENCIL SMOKE EVACUATION 15FT

## (undated) DEVICE — LUBRICANT INST ELECTROLUBE Z SOLUTION

## (undated) DEVICE — SUT VICRYL 0 27" CT-1

## (undated) DEVICE — XI SEAL UNIVERSIAL 5-12MM

## (undated) DEVICE — DRAPE IOBAN 23" X 17"

## (undated) DEVICE — TROCAR SURGIQUEST AIRSEAL 12MMX100MM

## (undated) DEVICE — XI ARM FORCE BIPOLAR 8MM

## (undated) DEVICE — XI CORD BIPOLAR CAUTERY (BLUE)

## (undated) DEVICE — SOL IRR POUR NS 0.9% 1000ML

## (undated) DEVICE — BLADE SCALPEL SAFETYLOCK #10

## (undated) DEVICE — STAPLER COVIDIEN ENDO GIA STANDARD HANDLE

## (undated) DEVICE — SNARE LRG

## (undated) DEVICE — FOLEY HOLDER STATLOCK 2 WAY ADULT

## (undated) DEVICE — TUBING IV SET GRAVITY 3Y 100" MACRO

## (undated) DEVICE — DRAPE INSTRUMENT POUCH 6.75" X 11"

## (undated) DEVICE — Device

## (undated) DEVICE — MARKER ENDO SPOT EX

## (undated) DEVICE — TROCAR SURGIQUEST AIRSEAL 8MMX100MM

## (undated) DEVICE — XI OBTURATOR OPTICAL BLADELESS 8MM

## (undated) DEVICE — TUBING SUCTION CONN 6FT STERILE

## (undated) DEVICE — TUBING CANNULA SALTER LABS NASAL ADULT 7FT

## (undated) DEVICE — TUBING IV SET SECOND 34" W/O LOK-BLUNT

## (undated) DEVICE — TUBING SUCTION 20FT

## (undated) DEVICE — BITE BLOCK ADULT 20 X 27MM (GREEN)

## (undated) DEVICE — XI TIP COVER

## (undated) DEVICE — XI CORD MONOPOLAR CAUTERY (GREEN)

## (undated) DEVICE — TUBING CONNECTING 6MM 20FT

## (undated) DEVICE — SUCTION YANKAUER NO CONTROL VENT

## (undated) DEVICE — SUT VICRYL 0 54" REEL UNDYED

## (undated) DEVICE — FORCEP RADIAL JAW 4 W NDL 2.2MM 2.8MM 160CM YELLOW DISP

## (undated) DEVICE — COVIDIEN SIGNIA POWER CONTROL SHELL

## (undated) DEVICE — TUBING AIRSEAL TRI-LUMEN FILTERED

## (undated) DEVICE — BITE BLOCK MAXI RUBBER STAMP

## (undated) DEVICE — FORMALIN CUPS 10% BUFFERED

## (undated) DEVICE — CATH ELECHMSTAT  INJ 7FR 210CM

## (undated) DEVICE — SENSOR O2 FINGER XL ADULT 24/BX 6BX/CA

## (undated) DEVICE — XI ARM CLIP APPLIER LARGE

## (undated) DEVICE — SYR LUER LOK 30CC

## (undated) DEVICE — XI DRAPE ARM

## (undated) DEVICE — XI DRAPE COLUMN